# Patient Record
Sex: MALE | Race: WHITE | NOT HISPANIC OR LATINO | Employment: OTHER | URBAN - METROPOLITAN AREA
[De-identification: names, ages, dates, MRNs, and addresses within clinical notes are randomized per-mention and may not be internally consistent; named-entity substitution may affect disease eponyms.]

---

## 2023-01-31 RX ORDER — ALPHA-D-GALACTOSIDASE 400 UNIT
TABLET ORAL
COMMUNITY
End: 2023-11-02

## 2023-01-31 RX ORDER — MELOXICAM 15 MG/1
1 TABLET ORAL EVERY MORNING
COMMUNITY
Start: 2019-04-18 | End: 2024-06-06 | Stop reason: HOSPADM

## 2023-01-31 RX ORDER — METHYLPREDNISOLONE 4 MG/1
TABLET ORAL
COMMUNITY
Start: 2022-09-23 | End: 2023-04-06 | Stop reason: ALTCHOICE

## 2023-01-31 RX ORDER — TRAMADOL HYDROCHLORIDE 50 MG/1
50 TABLET ORAL EVERY 8 HOURS
COMMUNITY
End: 2023-11-02

## 2023-01-31 RX ORDER — METOPROLOL SUCCINATE 100 MG/1
1 TABLET, EXTENDED RELEASE ORAL DAILY
COMMUNITY
Start: 2017-08-10 | End: 2023-04-06 | Stop reason: SDUPTHER

## 2023-01-31 RX ORDER — IPRATROPIUM BROMIDE 21 UG/1
1 SPRAY, METERED NASAL
COMMUNITY
Start: 2022-09-14 | End: 2023-11-02

## 2023-01-31 RX ORDER — SIMETHICONE 125 MG
CAPSULE ORAL
COMMUNITY
End: 2023-11-02

## 2023-01-31 RX ORDER — GABAPENTIN 300 MG/1
1 CAPSULE ORAL
COMMUNITY
Start: 2021-12-01 | End: 2023-11-02

## 2023-01-31 RX ORDER — DIPHENHYDRAMINE HCL 25 MG
TABLET ORAL
COMMUNITY
End: 2023-11-02

## 2023-01-31 RX ORDER — SIMVASTATIN 20 MG/1
1 TABLET, FILM COATED ORAL DAILY
COMMUNITY
Start: 2017-07-06 | End: 2023-04-06 | Stop reason: SDUPTHER

## 2023-02-01 PROBLEM — E78.5 HYPERLIPEMIA: Status: ACTIVE | Noted: 2023-01-31

## 2023-02-01 PROBLEM — M25.551 HIP PAIN, RIGHT: Status: ACTIVE | Noted: 2023-01-31

## 2023-02-01 PROBLEM — R29.898 WEAKNESS OF SHOULDER: Status: ACTIVE | Noted: 2023-02-01

## 2023-02-01 PROBLEM — R30.0 DYSURIA: Status: ACTIVE | Noted: 2023-01-31

## 2023-02-01 PROBLEM — R29.898 COMPLAINTS OF LEG WEAKNESS: Status: ACTIVE | Noted: 2023-01-31

## 2023-02-01 PROBLEM — M46.1 SACROILIITIS (CMS-HCC): Status: ACTIVE | Noted: 2023-02-01

## 2023-02-01 PROBLEM — M16.11 ARTHRITIS OF RIGHT HIP: Status: ACTIVE | Noted: 2023-01-31

## 2023-02-01 PROBLEM — H61.23 BILATERAL IMPACTED CERUMEN: Status: ACTIVE | Noted: 2023-01-31

## 2023-02-01 PROBLEM — R41.840 CONCENTRATION DEFICIT: Status: ACTIVE | Noted: 2023-01-31

## 2023-02-01 PROBLEM — Z86.59 HISTORY OF CLAUSTROPHOBIA: Status: ACTIVE | Noted: 2023-01-31

## 2023-02-01 PROBLEM — S46.009A ROTATOR CUFF INJURY: Status: ACTIVE | Noted: 2023-02-01

## 2023-02-01 PROBLEM — R20.0 NUMBNESS: Status: ACTIVE | Noted: 2023-01-31

## 2023-02-01 PROBLEM — M19.90 OSTEOARTHRITIS: Status: ACTIVE | Noted: 2023-02-01

## 2023-02-01 PROBLEM — K86.2 PANCREATIC CYST (HHS-HCC): Status: ACTIVE | Noted: 2023-02-01

## 2023-02-01 PROBLEM — L40.9 PSORIASIS: Status: ACTIVE | Noted: 2023-02-01

## 2023-02-01 PROBLEM — H91.93 BILATERAL HEARING LOSS: Status: ACTIVE | Noted: 2023-01-31

## 2023-02-01 PROBLEM — M54.9 CHRONIC BACK PAIN: Status: ACTIVE | Noted: 2023-01-31

## 2023-02-01 PROBLEM — U07.1 COVID-19: Status: ACTIVE | Noted: 2023-01-31

## 2023-02-01 PROBLEM — M47.816 FACET DEGENERATION OF LUMBAR REGION: Status: ACTIVE | Noted: 2023-01-31

## 2023-02-01 PROBLEM — I10 BENIGN ESSENTIAL HTN: Status: ACTIVE | Noted: 2023-01-31

## 2023-02-01 PROBLEM — I45.10 RBBB: Status: ACTIVE | Noted: 2023-02-01

## 2023-02-01 PROBLEM — M13.0 POLYARTHROPATHY: Status: ACTIVE | Noted: 2023-02-01

## 2023-02-01 PROBLEM — D17.9 LIPOMA: Status: ACTIVE | Noted: 2023-01-31

## 2023-02-01 PROBLEM — R19.7 DIARRHEA: Status: ACTIVE | Noted: 2023-01-31

## 2023-02-01 PROBLEM — L98.9 SCALP LESION: Status: ACTIVE | Noted: 2023-02-01

## 2023-02-01 PROBLEM — M25.511 RIGHT SHOULDER PAIN: Status: ACTIVE | Noted: 2023-02-01

## 2023-02-01 PROBLEM — G89.29 CHRONIC BACK PAIN: Status: ACTIVE | Noted: 2023-01-31

## 2023-02-01 PROBLEM — R01.1 SYSTOLIC MURMUR: Status: ACTIVE | Noted: 2023-02-01

## 2023-02-01 PROBLEM — E66.9 OBESITY: Status: ACTIVE | Noted: 2023-01-31

## 2023-02-01 PROBLEM — M54.16 LUMBAR RADICULOPATHY: Status: ACTIVE | Noted: 2023-01-31

## 2023-02-01 PROBLEM — K56.2 SIGMOID VOLVULUS (MULTI): Status: ACTIVE | Noted: 2023-02-01

## 2023-02-01 PROBLEM — K58.0 IRRITABLE BOWEL SYNDROME WITH DIARRHEA: Status: ACTIVE | Noted: 2023-01-31

## 2023-02-01 PROBLEM — M25.551 ARTHRALGIA OF HIP, RIGHT: Status: ACTIVE | Noted: 2023-01-31

## 2023-03-14 ENCOUNTER — TELEPHONE (OUTPATIENT)
Dept: PRIMARY CARE | Facility: CLINIC | Age: 78
End: 2023-03-14

## 2023-03-15 ENCOUNTER — APPOINTMENT (OUTPATIENT)
Dept: PRIMARY CARE | Facility: CLINIC | Age: 78
End: 2023-03-15
Payer: MEDICARE

## 2023-04-06 ENCOUNTER — OFFICE VISIT (OUTPATIENT)
Dept: PRIMARY CARE | Facility: CLINIC | Age: 78
End: 2023-04-06
Payer: MEDICARE

## 2023-04-06 VITALS
BODY MASS INDEX: 32.28 KG/M2 | DIASTOLIC BLOOD PRESSURE: 76 MMHG | TEMPERATURE: 97.2 F | HEART RATE: 47 BPM | SYSTOLIC BLOOD PRESSURE: 128 MMHG | RESPIRATION RATE: 18 BRPM | WEIGHT: 225 LBS | OXYGEN SATURATION: 96 %

## 2023-04-06 DIAGNOSIS — I10 HYPERTENSION, UNSPECIFIED TYPE: ICD-10-CM

## 2023-04-06 DIAGNOSIS — K58.0 IRRITABLE BOWEL SYNDROME WITH DIARRHEA: ICD-10-CM

## 2023-04-06 DIAGNOSIS — K86.2 PANCREATIC CYST (HHS-HCC): ICD-10-CM

## 2023-04-06 DIAGNOSIS — M25.511 CHRONIC RIGHT SHOULDER PAIN: ICD-10-CM

## 2023-04-06 DIAGNOSIS — K90.9 DIARRHEA DUE TO MALABSORPTION (HHS-HCC): ICD-10-CM

## 2023-04-06 DIAGNOSIS — N20.0 NEPHROLITHIASIS: ICD-10-CM

## 2023-04-06 DIAGNOSIS — E78.5 DYSLIPIDEMIA: ICD-10-CM

## 2023-04-06 DIAGNOSIS — G89.29 CHRONIC RIGHT SHOULDER PAIN: ICD-10-CM

## 2023-04-06 DIAGNOSIS — M25.551 HIP PAIN, RIGHT: ICD-10-CM

## 2023-04-06 DIAGNOSIS — M54.16 LUMBAR RADICULOPATHY: ICD-10-CM

## 2023-04-06 DIAGNOSIS — E78.2 MODERATE MIXED HYPERLIPIDEMIA NOT REQUIRING STATIN THERAPY: ICD-10-CM

## 2023-04-06 DIAGNOSIS — I10 BENIGN ESSENTIAL HTN: ICD-10-CM

## 2023-04-06 DIAGNOSIS — R19.7 DIARRHEA DUE TO MALABSORPTION (HHS-HCC): ICD-10-CM

## 2023-04-06 DIAGNOSIS — N20.0 RENAL STONE: Primary | ICD-10-CM

## 2023-04-06 PROCEDURE — 3074F SYST BP LT 130 MM HG: CPT | Performed by: INTERNAL MEDICINE

## 2023-04-06 PROCEDURE — 3078F DIAST BP <80 MM HG: CPT | Performed by: INTERNAL MEDICINE

## 2023-04-06 PROCEDURE — 1036F TOBACCO NON-USER: CPT | Performed by: INTERNAL MEDICINE

## 2023-04-06 PROCEDURE — 99214 OFFICE O/P EST MOD 30 MIN: CPT | Performed by: INTERNAL MEDICINE

## 2023-04-06 PROCEDURE — 1159F MED LIST DOCD IN RCRD: CPT | Performed by: INTERNAL MEDICINE

## 2023-04-06 RX ORDER — TAMSULOSIN HYDROCHLORIDE 0.4 MG/1
1 CAPSULE ORAL DAILY
COMMUNITY
Start: 2023-04-02

## 2023-04-06 RX ORDER — SIMVASTATIN 20 MG/1
20 TABLET, FILM COATED ORAL DAILY
Qty: 90 TABLET | Refills: 3 | Status: SHIPPED | OUTPATIENT
Start: 2023-04-06 | End: 2024-04-26 | Stop reason: SDUPTHER

## 2023-04-06 RX ORDER — METOPROLOL SUCCINATE 100 MG/1
100 TABLET, EXTENDED RELEASE ORAL DAILY
Qty: 90 TABLET | Refills: 3 | Status: SHIPPED | OUTPATIENT
Start: 2023-04-06 | End: 2023-12-07 | Stop reason: SDUPTHER

## 2023-04-06 ASSESSMENT — PATIENT HEALTH QUESTIONNAIRE - PHQ9
2. FEELING DOWN, DEPRESSED OR HOPELESS: NOT AT ALL
1. LITTLE INTEREST OR PLEASURE IN DOING THINGS: NOT AT ALL
SUM OF ALL RESPONSES TO PHQ9 QUESTIONS 1 AND 2: 0

## 2023-04-06 ASSESSMENT — PAIN SCALES - GENERAL: PAINLEVEL: 3

## 2023-04-06 NOTE — PROGRESS NOTES
Subjective   Patient ID: Kit Franklin is a 77 y.o. male who presents for Annual Exam (Patient stated he would like to go over CAT scan.).    HPI patient presents to the office for scheduled visit was last seen in the office back in September of last year.  At that time because of the ongoing issue could not identify a source or cause of chronic diarrhea patient was in the GI service initial evaluation has not yet also changed the course of events for him I believe we will also plan Xifaxan with no benefit still that this was adequate required treatment and also over-the-counter    The most recent event was a pain in the flank that he was not sure what it was the pain escalated to the point that he went to emergency room was diagnosed with a left ureteral stone metformin and is in in size with several in the left upper pole of the kidney there is mild hydroureter it appears that by the time he went home he had been feeling much better because he was treated in the ER and the next morning he developed significant pain again which by the next couple of days the pain subsided he might of passed the stone he has been seen by urology and no intervention recommended currently without any pain no history prior of stones of unknown to him    Pancreatic enzymes have not been is issue for which is opted to get treatment through injections previously shoulder problem is very arthritic is opted against surgery remains adequately against it.    He still is very active and gets around and does work including yard work.    He drinks alcohol probably more than he needs to but not excessive when he was younger he was more dependent.  He does not appear to be entirely a malabsorptive process from that issue baseline normal elastase level.    Remains vaccinated    No chest pain or dyspnea or dizziness or nausea    No falls      Review of Systems 10 systems are reviewed with dementia were negative  Objective   /76 (BP Location:  Left arm, Patient Position: Sitting)   Pulse (!) 47   Temp 36.2 °C (97.2 °F)   Resp 18   Wt 102 kg (225 lb)   SpO2 96%   BMI 32.28 kg/m²     Physical Exam HEENT normocephalic atraumatic pupils round and reactive no oropharyngeal exudate no thyromegaly  Carotid bruits absent  Cardiovascular regular rate and rhythm no murmurs  Respiratory bilateral breath sounds without rales or rhonchi or mitral chest expansion bilaterally  Abdomen soft nontender bowel sounds are present no organomegaly  Skin warm without any rashes  Musculoskeletal no digital clubbing or cyanosis normal gait no muscle atrophy  Neuro alert and oriented Ãƒ-3. Speech clear. Follows commands appropriately  Pulse normal carotid pulse normal radial pulse normal pedal pulses      Assessment/Plan   Problem List Items Addressed This Visit          Nervous    Lumbar radiculopathy       Circulatory    Benign essential HTN     Well-controlled no change of course review renal panel            Digestive    Irritable bowel syndrome with diarrhea    Pancreatic cyst     Based on review of GIs note in EMR and patient's old record it appears to have been a benign cyst present at least 5 years along            Musculoskeletal    Hip pain, right     Previously responded well to intra-articular injection of steroid under fluoroscopy or CT-guided         Right shoulder pain     This is a very arthritic shoulder that he has not agreed to undergo a replacement            Other    Diarrhea     Chronic diarrhea up to 4 episodes per day history of increasing excessive alcohol consumption although now it is not excessive but it is daily there is no evidence of any    Deficiency based on status    IBS is possibility of course medication cause is not identified a source    He was asked to continue to follow with GI         Relevant Orders    Thyroid Stimulating Hormone    Thyroxine, Free    Pancreatic Elastase, Fecal    Hyperlipemia     Well-tolerated statin therapy   He is  due for lipid profile and a hepatic profile which will be ordered to be done fasting          Other Visit Diagnoses       Renal stone    -  Primary    Relevant Orders    Urinalysis Microscopic Only    Hypertension, unspecified type        Relevant Medications    metoprolol succinate XL (Toprol-XL) 100 mg 24 hr tablet    Other Relevant Orders    Comprehensive Metabolic Panel    Dyslipidemia        Relevant Medications    simvastatin (Zocor) 20 mg tablet    Other Relevant Orders    CBC and Auto Differential    Comprehensive Metabolic Panel    Lipid panel    Nephrolithiasis

## 2023-04-06 NOTE — ASSESSMENT & PLAN NOTE
Well-tolerated statin therapy   He is due for lipid profile and a hepatic profile which will be ordered to be done fasting

## 2023-04-06 NOTE — ASSESSMENT & PLAN NOTE
Chronic diarrhea up to 4 episodes per day history of increasing excessive alcohol consumption although now it is not excessive but it is daily there is no evidence of any    Deficiency based on status    IBS is possibility of course medication cause is not identified a source    He was asked to continue to follow with GI

## 2023-10-06 ENCOUNTER — APPOINTMENT (OUTPATIENT)
Dept: PRIMARY CARE | Facility: CLINIC | Age: 78
End: 2023-10-06
Payer: MEDICARE

## 2023-11-02 ENCOUNTER — OFFICE VISIT (OUTPATIENT)
Dept: PRIMARY CARE | Facility: CLINIC | Age: 78
End: 2023-11-02
Payer: MEDICARE

## 2023-11-02 VITALS
SYSTOLIC BLOOD PRESSURE: 124 MMHG | HEIGHT: 67 IN | WEIGHT: 229.6 LBS | HEART RATE: 60 BPM | BODY MASS INDEX: 36.03 KG/M2 | OXYGEN SATURATION: 98 % | DIASTOLIC BLOOD PRESSURE: 72 MMHG

## 2023-11-02 DIAGNOSIS — I10 HYPERTENSION, UNSPECIFIED TYPE: ICD-10-CM

## 2023-11-02 DIAGNOSIS — Z00.00 ANNUAL PHYSICAL EXAM: Primary | ICD-10-CM

## 2023-11-02 DIAGNOSIS — K58.0 IRRITABLE BOWEL SYNDROME WITH DIARRHEA: ICD-10-CM

## 2023-11-02 DIAGNOSIS — Z79.899 HIGH RISK MEDICATION USE: ICD-10-CM

## 2023-11-02 DIAGNOSIS — R53.83 FATIGUE, UNSPECIFIED TYPE: ICD-10-CM

## 2023-11-02 DIAGNOSIS — E78.5 DYSLIPIDEMIA: ICD-10-CM

## 2023-11-02 PROCEDURE — 1036F TOBACCO NON-USER: CPT | Performed by: FAMILY MEDICINE

## 2023-11-02 PROCEDURE — 3074F SYST BP LT 130 MM HG: CPT | Performed by: FAMILY MEDICINE

## 2023-11-02 PROCEDURE — 1125F AMNT PAIN NOTED PAIN PRSNT: CPT | Performed by: FAMILY MEDICINE

## 2023-11-02 PROCEDURE — 1170F FXNL STATUS ASSESSED: CPT | Performed by: FAMILY MEDICINE

## 2023-11-02 PROCEDURE — 1159F MED LIST DOCD IN RCRD: CPT | Performed by: FAMILY MEDICINE

## 2023-11-02 PROCEDURE — 3078F DIAST BP <80 MM HG: CPT | Performed by: FAMILY MEDICINE

## 2023-11-02 PROCEDURE — 99214 OFFICE O/P EST MOD 30 MIN: CPT | Performed by: FAMILY MEDICINE

## 2023-11-02 PROCEDURE — G0439 PPPS, SUBSEQ VISIT: HCPCS | Performed by: FAMILY MEDICINE

## 2023-11-02 PROCEDURE — 1160F RVW MEDS BY RX/DR IN RCRD: CPT | Performed by: FAMILY MEDICINE

## 2023-11-02 RX ORDER — CHOLESTYRAMINE 4 G/5.5G
4 POWDER, FOR SUSPENSION ORAL 2 TIMES DAILY PRN
Qty: 60 PACKET | Refills: 11 | Status: SHIPPED | OUTPATIENT
Start: 2023-11-02 | End: 2024-11-01

## 2023-11-02 ASSESSMENT — PAIN SCALES - GENERAL: PAINLEVEL: 2

## 2023-11-02 ASSESSMENT — ENCOUNTER SYMPTOMS
LOSS OF SENSATION IN FEET: 0
OCCASIONAL FEELINGS OF UNSTEADINESS: 0
DEPRESSION: 0

## 2023-11-02 ASSESSMENT — ACTIVITIES OF DAILY LIVING (ADL)
BATHING: INDEPENDENT
DRESSING: INDEPENDENT
TAKING_MEDICATION: INDEPENDENT
GROCERY_SHOPPING: INDEPENDENT
MANAGING_FINANCES: INDEPENDENT
DOING_HOUSEWORK: INDEPENDENT

## 2023-11-02 ASSESSMENT — PATIENT HEALTH QUESTIONNAIRE - PHQ9
1. LITTLE INTEREST OR PLEASURE IN DOING THINGS: NOT AT ALL
SUM OF ALL RESPONSES TO PHQ9 QUESTIONS 1 AND 2: 0
2. FEELING DOWN, DEPRESSED OR HOPELESS: NOT AT ALL

## 2023-11-02 NOTE — PATIENT INSTRUCTIONS
Decrease metoprolol to three quarters of a tablet for 1 week will be cut back cut down to half a tablet daily  Stop caffeine for 1 week and see if better with diarrhea.  If not better, start the cholestyramine.  Can start with 1 pack daily and try it for 1 week.  If not better can increase to 1 tab twice daily.

## 2023-11-02 NOTE — PROGRESS NOTES
"Subjective   Patient ID: Kit Franklin is a 77 y.o. male who presents for New Patient Visit (Establish care with pcp, pt is not fasting. ) and Medicare Annual Wellness Visit Subsequent (Medicare annual exam. ).    HPI   Patient here to establish and get Medicare wellness check.  Past medical history reviewed with patient along with a review of his medical records.  .   Retired x 17yrs.  Work: .    Review of Systems  Complains of no energy.  Plays a little golf and chess. Sleep fair.  Wakes at night.  Bowels wakes him at night. At least 3 BMs daily.  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  Neck: Supple, no JVD or carotid bruit  Heart: Regular rate and rhythm, no murmur  Lungs: Clear to auscultation  Lower extremities: No edema    Assessment/Plan   Problem List Items Addressed This Visit             ICD-10-CM       Gastrointestinal and Abdominal    Irritable bowel syndrome with diarrhea K58.0    Relevant Medications    cholestyramine-aspartame (Prevalite) 4 gram packet     Other Visit Diagnoses         Codes    Annual physical exam    -  Primary Z00.00    Relevant Orders    Comprehensive Metabolic Panel    CBC    Lipid Panel    TSH with reflex to Free T4 if abnormal    Hepatitis C antibody    Hemoglobin A1c    Hypertension, unspecified type     I10    Relevant Orders    Comprehensive Metabolic Panel    CBC    Lipid Panel    TSH with reflex to Free T4 if abnormal    Dyslipidemia     E78.5    Relevant Orders    Comprehensive Metabolic Panel    CBC    Lipid Panel    High risk medication use     Z79.899    Relevant Orders    Comprehensive Metabolic Panel    CBC    Lipid Panel    Fatigue, unspecified type     R53.83    Relevant Orders    Comprehensive " Metabolic Panel    CBC    TSH with reflex to Free T4 if abnormal        1.  Health maintenance: Care gaps addressed  2.  Irritable bowel bowel with diarrhea: Patient with a history of colon surgery.  He has been suffering with chronic diarrhea.  He has urgent emergency bowels.  We are going to have him hold his coffee for 1 week to see if this makes a difference.  If no change, he can start cholestyramine slowly.  We will see him back in 1 month to reevaluate  3.  Hypertension: Patient complaining of fatigue.  We are going to cut his metoprolol down to 75 mg daily and then to 50 mg after 1 week.  He will follow-up in 1 week.  He is to call with any problems of racing heart or palpitations.  4.  Fatigue: Hopefully with improving his bowel issue and his blood pressure medication adjustment, we can get him feeling better and being more physically active again.  TSH and routine labs ordered  5.  Hyperlipidemia: We will recheck lipid panel.  We will contact patient as results come in.  Plan follow-up in 1 month

## 2023-11-16 DIAGNOSIS — Z12.5 PROSTATE CANCER SCREENING: Primary | ICD-10-CM

## 2023-11-21 ENCOUNTER — LAB (OUTPATIENT)
Dept: LAB | Facility: LAB | Age: 78
End: 2023-11-21
Payer: MEDICARE

## 2023-11-21 DIAGNOSIS — E78.5 DYSLIPIDEMIA: ICD-10-CM

## 2023-11-21 DIAGNOSIS — I10 HYPERTENSION, UNSPECIFIED TYPE: ICD-10-CM

## 2023-11-21 DIAGNOSIS — R53.83 FATIGUE, UNSPECIFIED TYPE: ICD-10-CM

## 2023-11-21 DIAGNOSIS — Z12.5 PROSTATE CANCER SCREENING: ICD-10-CM

## 2023-11-21 DIAGNOSIS — Z00.00 ANNUAL PHYSICAL EXAM: ICD-10-CM

## 2023-11-21 DIAGNOSIS — Z79.899 HIGH RISK MEDICATION USE: ICD-10-CM

## 2023-11-21 LAB
ALBUMIN SERPL BCP-MCNC: 4.5 G/DL (ref 3.4–5)
ALP SERPL-CCNC: 65 U/L (ref 33–136)
ALT SERPL W P-5'-P-CCNC: 22 U/L (ref 10–52)
ANION GAP SERPL CALC-SCNC: 11 MMOL/L (ref 10–20)
AST SERPL W P-5'-P-CCNC: 20 U/L (ref 9–39)
BILIRUB SERPL-MCNC: 0.7 MG/DL (ref 0–1.2)
BUN SERPL-MCNC: 21 MG/DL (ref 6–23)
CALCIUM SERPL-MCNC: 9.5 MG/DL (ref 8.6–10.6)
CHLORIDE SERPL-SCNC: 108 MMOL/L (ref 98–107)
CHOLEST SERPL-MCNC: 155 MG/DL (ref 0–199)
CHOLESTEROL/HDL RATIO: 2.8
CO2 SERPL-SCNC: 29 MMOL/L (ref 21–32)
CREAT SERPL-MCNC: 0.74 MG/DL (ref 0.5–1.3)
ERYTHROCYTE [DISTWIDTH] IN BLOOD BY AUTOMATED COUNT: 13.2 % (ref 11.5–14.5)
EST. AVERAGE GLUCOSE BLD GHB EST-MCNC: 114 MG/DL
GFR SERPL CREATININE-BSD FRML MDRD: >90 ML/MIN/1.73M*2
GLUCOSE SERPL-MCNC: 94 MG/DL (ref 74–99)
HBA1C MFR BLD: 5.6 %
HCT VFR BLD AUTO: 42.8 % (ref 41–52)
HCV AB SER QL: NONREACTIVE
HDLC SERPL-MCNC: 55.5 MG/DL
HGB BLD-MCNC: 13.6 G/DL (ref 13.5–17.5)
LDLC SERPL CALC-MCNC: 74 MG/DL
MCH RBC QN AUTO: 31.5 PG (ref 26–34)
MCHC RBC AUTO-ENTMCNC: 31.8 G/DL (ref 32–36)
MCV RBC AUTO: 99 FL (ref 80–100)
NON HDL CHOLESTEROL: 100 MG/DL (ref 0–149)
NRBC BLD-RTO: 0 /100 WBCS (ref 0–0)
PLATELET # BLD AUTO: 159 X10*3/UL (ref 150–450)
POTASSIUM SERPL-SCNC: 4 MMOL/L (ref 3.5–5.3)
PROT SERPL-MCNC: 7 G/DL (ref 6.4–8.2)
PSA SERPL-MCNC: 0.28 NG/ML
RBC # BLD AUTO: 4.32 X10*6/UL (ref 4.5–5.9)
SODIUM SERPL-SCNC: 144 MMOL/L (ref 136–145)
TRIGL SERPL-MCNC: 126 MG/DL (ref 0–149)
TSH SERPL-ACNC: 2.1 MIU/L (ref 0.44–3.98)
VLDL: 25 MG/DL (ref 0–40)
WBC # BLD AUTO: 7.9 X10*3/UL (ref 4.4–11.3)

## 2023-11-21 PROCEDURE — 85027 COMPLETE CBC AUTOMATED: CPT

## 2023-11-21 PROCEDURE — 36415 COLL VENOUS BLD VENIPUNCTURE: CPT

## 2023-11-21 PROCEDURE — 80061 LIPID PANEL: CPT

## 2023-11-21 PROCEDURE — 84443 ASSAY THYROID STIM HORMONE: CPT

## 2023-11-21 PROCEDURE — 83036 HEMOGLOBIN GLYCOSYLATED A1C: CPT

## 2023-11-21 PROCEDURE — G0103 PSA SCREENING: HCPCS

## 2023-11-21 PROCEDURE — 86803 HEPATITIS C AB TEST: CPT

## 2023-11-21 PROCEDURE — 80053 COMPREHEN METABOLIC PANEL: CPT

## 2023-12-06 ASSESSMENT — ENCOUNTER SYMPTOMS
BLURRED VISION: 0
HYPERTENSION: 1
SHORTNESS OF BREATH: 0
NECK PAIN: 1
SWEATS: 0
PND: 0
HEADACHES: 0
ORTHOPNEA: 0
PALPITATIONS: 0

## 2023-12-07 ENCOUNTER — OFFICE VISIT (OUTPATIENT)
Dept: PRIMARY CARE | Facility: CLINIC | Age: 78
End: 2023-12-07
Payer: MEDICARE

## 2023-12-07 VITALS
WEIGHT: 230.6 LBS | BODY MASS INDEX: 36.12 KG/M2 | SYSTOLIC BLOOD PRESSURE: 130 MMHG | HEART RATE: 66 BPM | DIASTOLIC BLOOD PRESSURE: 80 MMHG | OXYGEN SATURATION: 97 %

## 2023-12-07 DIAGNOSIS — K58.0 IRRITABLE BOWEL SYNDROME WITH DIARRHEA: ICD-10-CM

## 2023-12-07 DIAGNOSIS — S16.1XXA STRAIN OF NECK MUSCLE, INITIAL ENCOUNTER: Primary | ICD-10-CM

## 2023-12-07 DIAGNOSIS — I10 HYPERTENSION, UNSPECIFIED TYPE: ICD-10-CM

## 2023-12-07 PROCEDURE — 1125F AMNT PAIN NOTED PAIN PRSNT: CPT | Performed by: FAMILY MEDICINE

## 2023-12-07 PROCEDURE — 3075F SYST BP GE 130 - 139MM HG: CPT | Performed by: FAMILY MEDICINE

## 2023-12-07 PROCEDURE — 1159F MED LIST DOCD IN RCRD: CPT | Performed by: FAMILY MEDICINE

## 2023-12-07 PROCEDURE — 3079F DIAST BP 80-89 MM HG: CPT | Performed by: FAMILY MEDICINE

## 2023-12-07 PROCEDURE — 1036F TOBACCO NON-USER: CPT | Performed by: FAMILY MEDICINE

## 2023-12-07 PROCEDURE — 99214 OFFICE O/P EST MOD 30 MIN: CPT | Performed by: FAMILY MEDICINE

## 2023-12-07 PROCEDURE — 1160F RVW MEDS BY RX/DR IN RCRD: CPT | Performed by: FAMILY MEDICINE

## 2023-12-07 RX ORDER — METOPROLOL SUCCINATE 50 MG/1
50 TABLET, EXTENDED RELEASE ORAL DAILY
Qty: 90 TABLET | Refills: 3 | Status: SHIPPED | OUTPATIENT
Start: 2023-12-07 | End: 2024-12-06

## 2023-12-07 ASSESSMENT — PAIN SCALES - GENERAL: PAINLEVEL: 2

## 2023-12-07 ASSESSMENT — ENCOUNTER SYMPTOMS: DEPRESSION: 0

## 2023-12-07 NOTE — PATIENT INSTRUCTIONS
"Goal used to, and go to the You [employer] website.  Then search Shannan Velázquez.  When you get there you will see her videos and find the video for her \"neck and upper back.\"  Alternatively, do a search for \"forward head\"  or just look for physical therapy for neck pain.  Consider for Physical Therapy  "

## 2023-12-07 NOTE — PROGRESS NOTES
Subjective   Patient ID: Kit Franklin is a 77 y.o. male who presents for Follow-up (1 month follow up to review medications.) and Neck Pain (Right side of neck pain and a little swollen. ).    HPI patient here for follow-up.  We lowered his metoprolol last visit.  He is feeling less fatigued.  Diarrhea improved a small amount but not significantly.  He did try the cholestyramine.  Patient has had some discomfort on the right side of his neck for over a week.  He feels it is a bit swollen.  There is some mild discomfort with range of motion.    Review of Systems    Objective   /80 (BP Location: Left arm, Patient Position: Sitting, BP Cuff Size: Adult)   Pulse 66   Wt 105 kg (230 lb 9.6 oz)   SpO2 97%   BMI 36.12 kg/m²     Physical Exam  Alert, pleasant and in no acute distress.  Neck: Supple, no JVD or carotid bruit.  Forward leaning head.  No gross swelling or deformity noted.  Mild to moderate generalized restriction of motion noted in all planes.  Heart: Regular rate and rhythm, no murmur  Lungs: Clear to auscultation  Lower extremities: No edema    Assessment/Plan   Problem List Items Addressed This Visit             ICD-10-CM       Gastrointestinal and Abdominal    Irritable bowel syndrome with diarrhea K58.0     Other Visit Diagnoses         Codes    Strain of neck muscle, initial encounter    -  Primary S16.1XXA    Hypertension, unspecified type     I10    Relevant Medications    metoprolol succinate XL (Toprol-XL) 50 mg 24 hr tablet          Patient here for follow-up.  1.  Hypertension: Still well-controlled.  We will continue the lower dose metoprolol as patient is feeling better.  2.  Diarrhea/IBS: Encourage patient to try twice daily cholestyramine.  Discussed avenues to success to control the bowels.  3.  Neck strain: I believe patient has a strained neck secondary to his arthritic forward leaning head.  Encourage patient to perform therapeutic type exercises.  Website information provided.   Would consider injections or other more aggressive treatments if not improving.  If there are new or evolving symptoms, patient should let us know.  Follow-up 6 months

## 2024-02-12 ENCOUNTER — APPOINTMENT (OUTPATIENT)
Dept: RADIOLOGY | Facility: HOSPITAL | Age: 79
End: 2024-02-12
Payer: MEDICARE

## 2024-02-12 ENCOUNTER — TELEPHONE (OUTPATIENT)
Dept: PRIMARY CARE | Facility: CLINIC | Age: 79
End: 2024-02-12
Payer: MEDICARE

## 2024-02-12 ENCOUNTER — HOSPITAL ENCOUNTER (EMERGENCY)
Facility: HOSPITAL | Age: 79
Discharge: HOME | End: 2024-02-12
Attending: EMERGENCY MEDICINE
Payer: MEDICARE

## 2024-02-12 VITALS
SYSTOLIC BLOOD PRESSURE: 134 MMHG | TEMPERATURE: 97.3 F | WEIGHT: 230 LBS | HEART RATE: 89 BPM | BODY MASS INDEX: 36.1 KG/M2 | DIASTOLIC BLOOD PRESSURE: 78 MMHG | RESPIRATION RATE: 18 BRPM | OXYGEN SATURATION: 98 % | HEIGHT: 67 IN

## 2024-02-12 DIAGNOSIS — M54.50 ACUTE LEFT-SIDED LOW BACK PAIN WITHOUT SCIATICA: Primary | ICD-10-CM

## 2024-02-12 DIAGNOSIS — M54.50 ACUTE RIGHT-SIDED LOW BACK PAIN WITHOUT SCIATICA: Primary | ICD-10-CM

## 2024-02-12 LAB
ALBUMIN SERPL BCP-MCNC: 4.6 G/DL (ref 3.4–5)
ALP SERPL-CCNC: 62 U/L (ref 33–136)
ALT SERPL W P-5'-P-CCNC: 22 U/L (ref 10–52)
ANION GAP SERPL CALC-SCNC: 12 MMOL/L (ref 10–20)
APPEARANCE UR: CLEAR
AST SERPL W P-5'-P-CCNC: 19 U/L (ref 9–39)
BASOPHILS # BLD AUTO: 0.02 X10*3/UL (ref 0–0.1)
BASOPHILS NFR BLD AUTO: 0.3 %
BILIRUB SERPL-MCNC: 0.4 MG/DL (ref 0–1.2)
BILIRUB UR STRIP.AUTO-MCNC: NEGATIVE MG/DL
BUN SERPL-MCNC: 24 MG/DL (ref 6–23)
CALCIUM SERPL-MCNC: 9.3 MG/DL (ref 8.6–10.3)
CHLORIDE SERPL-SCNC: 107 MMOL/L (ref 98–107)
CO2 SERPL-SCNC: 23 MMOL/L (ref 21–32)
COLOR UR: YELLOW
CREAT SERPL-MCNC: 0.78 MG/DL (ref 0.5–1.3)
EGFRCR SERPLBLD CKD-EPI 2021: >90 ML/MIN/1.73M*2
EOSINOPHIL # BLD AUTO: 0.07 X10*3/UL (ref 0–0.4)
EOSINOPHIL NFR BLD AUTO: 1.2 %
ERYTHROCYTE [DISTWIDTH] IN BLOOD BY AUTOMATED COUNT: 13 % (ref 11.5–14.5)
GLUCOSE SERPL-MCNC: 93 MG/DL (ref 74–99)
GLUCOSE UR STRIP.AUTO-MCNC: NEGATIVE MG/DL
HCT VFR BLD AUTO: 40.1 % (ref 41–52)
HGB BLD-MCNC: 13.5 G/DL (ref 13.5–17.5)
IMM GRANULOCYTES # BLD AUTO: 0.01 X10*3/UL (ref 0–0.5)
IMM GRANULOCYTES NFR BLD AUTO: 0.2 % (ref 0–0.9)
KETONES UR STRIP.AUTO-MCNC: NEGATIVE MG/DL
LACTATE SERPL-SCNC: 1.7 MMOL/L (ref 0.4–2)
LEUKOCYTE ESTERASE UR QL STRIP.AUTO: NEGATIVE
LYMPHOCYTES # BLD AUTO: 2.27 X10*3/UL (ref 0.8–3)
LYMPHOCYTES NFR BLD AUTO: 38.6 %
MAGNESIUM SERPL-MCNC: 2.13 MG/DL (ref 1.6–2.4)
MCH RBC QN AUTO: 32.7 PG (ref 26–34)
MCHC RBC AUTO-ENTMCNC: 33.7 G/DL (ref 32–36)
MCV RBC AUTO: 97 FL (ref 80–100)
MONOCYTES # BLD AUTO: 0.77 X10*3/UL (ref 0.05–0.8)
MONOCYTES NFR BLD AUTO: 13.1 %
NEUTROPHILS # BLD AUTO: 2.74 X10*3/UL (ref 1.6–5.5)
NEUTROPHILS NFR BLD AUTO: 46.6 %
NITRITE UR QL STRIP.AUTO: NEGATIVE
NRBC BLD-RTO: 0 /100 WBCS (ref 0–0)
PH UR STRIP.AUTO: 5 [PH]
PLATELET # BLD AUTO: 147 X10*3/UL (ref 150–450)
POTASSIUM SERPL-SCNC: 4.1 MMOL/L (ref 3.5–5.3)
PROT SERPL-MCNC: 7.1 G/DL (ref 6.4–8.2)
PROT UR STRIP.AUTO-MCNC: NEGATIVE MG/DL
RBC # BLD AUTO: 4.13 X10*6/UL (ref 4.5–5.9)
RBC # UR STRIP.AUTO: NEGATIVE /UL
SODIUM SERPL-SCNC: 138 MMOL/L (ref 136–145)
SP GR UR STRIP.AUTO: 1.02
UROBILINOGEN UR STRIP.AUTO-MCNC: <2 MG/DL
WBC # BLD AUTO: 5.9 X10*3/UL (ref 4.4–11.3)

## 2024-02-12 PROCEDURE — 96376 TX/PRO/DX INJ SAME DRUG ADON: CPT

## 2024-02-12 PROCEDURE — 96361 HYDRATE IV INFUSION ADD-ON: CPT

## 2024-02-12 PROCEDURE — 2550000001 HC RX 255 CONTRASTS: Performed by: EMERGENCY MEDICINE

## 2024-02-12 PROCEDURE — 96375 TX/PRO/DX INJ NEW DRUG ADDON: CPT

## 2024-02-12 PROCEDURE — 83605 ASSAY OF LACTIC ACID: CPT | Performed by: EMERGENCY MEDICINE

## 2024-02-12 PROCEDURE — 96374 THER/PROPH/DIAG INJ IV PUSH: CPT | Mod: 59

## 2024-02-12 PROCEDURE — 36415 COLL VENOUS BLD VENIPUNCTURE: CPT | Performed by: EMERGENCY MEDICINE

## 2024-02-12 PROCEDURE — 80053 COMPREHEN METABOLIC PANEL: CPT | Performed by: EMERGENCY MEDICINE

## 2024-02-12 PROCEDURE — 99284 EMERGENCY DEPT VISIT MOD MDM: CPT | Mod: 25,27

## 2024-02-12 PROCEDURE — 74177 CT ABD & PELVIS W/CONTRAST: CPT | Performed by: STUDENT IN AN ORGANIZED HEALTH CARE EDUCATION/TRAINING PROGRAM

## 2024-02-12 PROCEDURE — 85025 COMPLETE CBC W/AUTO DIFF WBC: CPT | Performed by: EMERGENCY MEDICINE

## 2024-02-12 PROCEDURE — 2500000004 HC RX 250 GENERAL PHARMACY W/ HCPCS (ALT 636 FOR OP/ED): Performed by: EMERGENCY MEDICINE

## 2024-02-12 PROCEDURE — 74177 CT ABD & PELVIS W/CONTRAST: CPT

## 2024-02-12 PROCEDURE — 83735 ASSAY OF MAGNESIUM: CPT | Performed by: EMERGENCY MEDICINE

## 2024-02-12 PROCEDURE — 81003 URINALYSIS AUTO W/O SCOPE: CPT | Performed by: EMERGENCY MEDICINE

## 2024-02-12 RX ORDER — MORPHINE SULFATE 4 MG/ML
4 INJECTION, SOLUTION INTRAMUSCULAR; INTRAVENOUS ONCE
Status: COMPLETED | OUTPATIENT
Start: 2024-02-12 | End: 2024-02-12

## 2024-02-12 RX ORDER — ONDANSETRON HYDROCHLORIDE 2 MG/ML
4 INJECTION, SOLUTION INTRAVENOUS ONCE
Status: COMPLETED | OUTPATIENT
Start: 2024-02-12 | End: 2024-02-12

## 2024-02-12 RX ORDER — CYCLOBENZAPRINE HCL 10 MG
10 TABLET ORAL 3 TIMES DAILY PRN
Qty: 30 TABLET | Refills: 0 | Status: SHIPPED | OUTPATIENT
Start: 2024-02-12 | End: 2024-06-06 | Stop reason: HOSPADM

## 2024-02-12 RX ORDER — LIDOCAINE 50 MG/G
1 PATCH TOPICAL DAILY
Qty: 30 PATCH | Refills: 0 | Status: SHIPPED | OUTPATIENT
Start: 2024-02-12 | End: 2024-02-13 | Stop reason: WASHOUT

## 2024-02-12 RX ORDER — OXYCODONE AND ACETAMINOPHEN 5; 325 MG/1; MG/1
1 TABLET ORAL EVERY 6 HOURS PRN
Qty: 10 TABLET | Refills: 0 | Status: SHIPPED | OUTPATIENT
Start: 2024-02-12 | End: 2024-02-15

## 2024-02-12 RX ADMIN — IOHEXOL 75 ML: 350 INJECTION, SOLUTION INTRAVENOUS at 21:23

## 2024-02-12 RX ADMIN — SODIUM CHLORIDE, POTASSIUM CHLORIDE, SODIUM LACTATE AND CALCIUM CHLORIDE 1000 ML: 600; 310; 30; 20 INJECTION, SOLUTION INTRAVENOUS at 19:47

## 2024-02-12 RX ADMIN — MORPHINE SULFATE 4 MG: 4 INJECTION, SOLUTION INTRAMUSCULAR; INTRAVENOUS at 19:48

## 2024-02-12 RX ADMIN — MORPHINE SULFATE 4 MG: 4 INJECTION, SOLUTION INTRAMUSCULAR; INTRAVENOUS at 22:46

## 2024-02-12 RX ADMIN — ONDANSETRON 4 MG: 2 INJECTION INTRAMUSCULAR; INTRAVENOUS at 19:48

## 2024-02-12 ASSESSMENT — LIFESTYLE VARIABLES
EVER HAD A DRINK FIRST THING IN THE MORNING TO STEADY YOUR NERVES TO GET RID OF A HANGOVER: NO
HAVE YOU EVER FELT YOU SHOULD CUT DOWN ON YOUR DRINKING: NO
EVER FELT BAD OR GUILTY ABOUT YOUR DRINKING: NO
HAVE PEOPLE ANNOYED YOU BY CRITICIZING YOUR DRINKING: NO

## 2024-02-12 ASSESSMENT — COLUMBIA-SUICIDE SEVERITY RATING SCALE - C-SSRS
6. HAVE YOU EVER DONE ANYTHING, STARTED TO DO ANYTHING, OR PREPARED TO DO ANYTHING TO END YOUR LIFE?: NO
2. HAVE YOU ACTUALLY HAD ANY THOUGHTS OF KILLING YOURSELF?: NO
1. IN THE PAST MONTH, HAVE YOU WISHED YOU WERE DEAD OR WISHED YOU COULD GO TO SLEEP AND NOT WAKE UP?: NO

## 2024-02-12 ASSESSMENT — PAIN DESCRIPTION - PAIN TYPE: TYPE: ACUTE PAIN

## 2024-02-12 ASSESSMENT — PAIN SCALES - GENERAL
PAINLEVEL_OUTOF10: 6
PAINLEVEL_OUTOF10: 6
PAINLEVEL_OUTOF10: 7

## 2024-02-12 ASSESSMENT — PAIN - FUNCTIONAL ASSESSMENT
PAIN_FUNCTIONAL_ASSESSMENT: 0-10
PAIN_FUNCTIONAL_ASSESSMENT: 0-10

## 2024-02-12 ASSESSMENT — PAIN DESCRIPTION - ORIENTATION: ORIENTATION: LEFT;LOWER

## 2024-02-12 ASSESSMENT — PAIN DESCRIPTION - LOCATION: LOCATION: BACK

## 2024-02-12 ASSESSMENT — PAIN DESCRIPTION - DESCRIPTORS: DESCRIPTORS: SHARP;SHOOTING

## 2024-02-12 NOTE — TELEPHONE ENCOUNTER
Kit stated he is still experiencing severe pain, it hurts for him to move and he said OTC pain meds aren't helping.   Wanted me to send a message out to see what the next steps would be for him?    Lumbar region on the left side/lower disk, is where the main focus is and the area that is giving him the most trouble. Level of pain Is kidney stone level he stated

## 2024-02-13 ENCOUNTER — OFFICE VISIT (OUTPATIENT)
Dept: PRIMARY CARE | Facility: CLINIC | Age: 79
End: 2024-02-13
Payer: MEDICARE

## 2024-02-13 VITALS
WEIGHT: 238 LBS | SYSTOLIC BLOOD PRESSURE: 134 MMHG | BODY MASS INDEX: 37.35 KG/M2 | DIASTOLIC BLOOD PRESSURE: 74 MMHG | HEART RATE: 68 BPM | OXYGEN SATURATION: 95 % | HEIGHT: 67 IN

## 2024-02-13 DIAGNOSIS — R10.9 ABDOMINAL PAIN, UNSPECIFIED ABDOMINAL LOCATION: Primary | ICD-10-CM

## 2024-02-13 DIAGNOSIS — S39.012A ACUTE MYOFASCIAL STRAIN OF LUMBAR REGION, INITIAL ENCOUNTER: ICD-10-CM

## 2024-02-13 LAB — HOLD SPECIMEN: NORMAL

## 2024-02-13 PROCEDURE — 1125F AMNT PAIN NOTED PAIN PRSNT: CPT | Performed by: FAMILY MEDICINE

## 2024-02-13 PROCEDURE — 99214 OFFICE O/P EST MOD 30 MIN: CPT | Performed by: FAMILY MEDICINE

## 2024-02-13 PROCEDURE — 1159F MED LIST DOCD IN RCRD: CPT | Performed by: FAMILY MEDICINE

## 2024-02-13 PROCEDURE — 3075F SYST BP GE 130 - 139MM HG: CPT | Performed by: FAMILY MEDICINE

## 2024-02-13 PROCEDURE — 3078F DIAST BP <80 MM HG: CPT | Performed by: FAMILY MEDICINE

## 2024-02-13 PROCEDURE — 1036F TOBACCO NON-USER: CPT | Performed by: FAMILY MEDICINE

## 2024-02-13 ASSESSMENT — ENCOUNTER SYMPTOMS: DEPRESSION: 1

## 2024-02-13 ASSESSMENT — PAIN SCALES - GENERAL: PAINLEVEL: 4

## 2024-02-13 NOTE — ED PROVIDER NOTES
HPI   Chief Complaint   Patient presents with    Back Pain     LEFT LOWER BACK PAIN X10 DAYS, IS 7/10, DOES NOT GO DOWN LEGS, DENIES LOSS OF BOWEL/BLADDER, DOES HAVE LIMITED ROM DUE TO PAIN, HAS + PULSES, DENIES CP, SOB, N/V/D, HA, NUMBNESS/TINGLING       Patient is a 78-year-old male, medical history significant for hypertension, prior pancreatitis, history of back pain presents to the emergency department for worsening back pain.  Patient states its mostly left-sided.  He states over the past 10 days, his pain has gotten worse.  He has been trying over-the-counter remedies and oral analgesics with little improvement.  It does not radiate down his legs.  He denies any douglas trauma.  He has a history of kidney stones, but states this feels different.  Today, he was to the point where he had a difficult time walking secondary to pain.  He has no weakness of the lower extremities.                          [place] Coma Scale Score: 15                     Patient History   Past Medical History:   Diagnosis Date    Personal history of other diseases of the circulatory system 03/09/2021    History of hypertension    Personal history of other diseases of the digestive system 03/09/2021    History of pancreatitis    Personal history of other diseases of the musculoskeletal system and connective tissue 03/09/2021    History of back pain    Personal history of other diseases of the musculoskeletal system and connective tissue 03/09/2021    History of back pain    Personal history of other endocrine, nutritional and metabolic disease 03/09/2021    History of hypercholesterolemia     Past Surgical History:   Procedure Laterality Date    OTHER SURGICAL HISTORY  03/09/2021    Cervical vertebral fusion    OTHER SURGICAL HISTORY  03/09/2021    Colectomy    OTHER SURGICAL HISTORY  03/09/2021    Colonoscopy    OTHER SURGICAL HISTORY  03/09/2021    Skin biopsy    OTHER SURGICAL HISTORY  03/09/2021    Total Knee Replacement    SHOULDER  SURGERY  2021    Shoulder Surgery     Family History   Problem Relation Name Age of Onset    Dementia Mother      Hypertension Mother      Other (Senile dementia) Mother      Dementia Father      Diabetes Father      Hypertension Father      Mitral valve prolapse Father      Heart attack Father      Other (Senile dementia) Father       Social History     Tobacco Use    Smoking status: Former     Types: Cigarettes     Quit date:      Years since quittin.1     Passive exposure: Past    Smokeless tobacco: Never   Substance Use Topics    Alcohol use: Yes     Alcohol/week: 10.0 standard drinks of alcohol     Types: 10 Cans of beer per week    Drug use: Never       Physical Exam   ED Triage Vitals [24 191]   Temperature Heart Rate Respirations BP   37.2 °C (99 °F) 73 18 157/90      Pulse Ox Temp Source Heart Rate Source Patient Position   99 % Temporal -- Sitting      BP Location FiO2 (%)     Right arm --       Physical Exam  Vitals and nursing note reviewed.   Constitutional:       General: He is not in acute distress.     Appearance: He is well-developed.   HENT:      Head: Normocephalic and atraumatic.   Eyes:      Extraocular Movements: Extraocular movements intact.      Conjunctiva/sclera: Conjunctivae normal.      Pupils: Pupils are equal, round, and reactive to light.   Cardiovascular:      Rate and Rhythm: Normal rate and regular rhythm.      Heart sounds: No murmur heard.  Pulmonary:      Effort: Pulmonary effort is normal. No respiratory distress.      Breath sounds: Normal breath sounds.   Abdominal:      General: Abdomen is flat.      Palpations: Abdomen is soft.      Tenderness: There is no abdominal tenderness.   Musculoskeletal:         General: No swelling.      Cervical back: Neck supple.      Right lower leg: No edema.      Left lower leg: No edema.   Skin:     General: Skin is warm and dry.      Capillary Refill: Capillary refill takes less than 2 seconds.   Neurological:       General: No focal deficit present.      Mental Status: He is alert and oriented to person, place, and time.   Psychiatric:         Mood and Affect: Mood normal.         ED Course & MDM   Diagnoses as of 02/12/24 2211   Acute left-sided low back pain without sciatica       Medical Decision Making  Medical Decision Making: Patient presents emergency department with gradually worsening back pain over the past week.  He states it has been painful to walk.  The pain does not radiate down his legs.  He has not had any difficulty with bowel or bladder.  He has had prior neck surgery, but denies surgery on his lumbar spine.  On arrival, he is uncomfortable but no distress.  Metabolic workup was pursued.  Patient's labs are at his baseline.  He underwent CT imaging.  There is no definitive evidence of spinal fracture.  There is degenerative change.  He also has questionable ileus.  Patient states has been dealing with that since prior abdominal surgery.  With pain medication is feeling improved.  I did discuss options with the patient.  He wants to follow-up as an outpatient as he does have an appointment scheduled for tomorrow.  I feel this is reasonable.    Differential Diagnoses Considered: Lumbar strain, kidney stone, diverticulitis, bowel obstruction    Chronic Medical Conditions Significantly Affecting Care: History of prior back pain, history of kidney stone    External Records Reviewed: I reviewed recent and relevant outside records including: Outpatient primary care visit    Independent Interpretation of Studies:  I independently interpreted: CT obtained and reviewed    Escalation of Care:  Appropriate for outpatient management    Social Determinants of Health Significantly Affecting Care:  No social determinants    Prescription Drug Consideration: Analgesics    Diagnostic testing considered: Noncontributory            Procedure  Procedures     Elias Hand MD  02/12/24 2211

## 2024-02-13 NOTE — PROGRESS NOTES
"Subjective   Patient ID: Kit Franklin is a 78 y.o. male who presents for Back Pain (Left lower back pain. ) and Follow-up (ED follow up from last night. ).    HPI   Patient here with complaint of left lower back pain.  He was in the emergency room last night.  Workup including CAT scan revealed ileus.  Patient has a long history of GI problems predominated by diarrhea.  He has not had a bowel movement for over 24 hours.  Patient has a history of recurrent lower back pains and spasms.  No radicular symptoms.  Pain is amplified by movements.  He is eating and drinking.    Review of Systems    Objective   /74 (BP Location: Left arm, Patient Position: Sitting, BP Cuff Size: Adult)   Pulse 68   Ht 1.702 m (5' 7\")   Wt 108 kg (238 lb)   SpO2 95%   BMI 37.28 kg/m²     Physical Exam  Alert, pleasant and in no acute distress.  Heart: Regular rate and rhythm without murmur  Lungs: Clear to auscultation  Lower extremities: No edema  Abdomen: Mild to moderately distended.  Active bowel sounds.  No localized tenderness.  Back: No palpable tenderness but quite a bit of palpable tightness in the paraspinal muscles from the thoracic through the lumbar.  Patient with quite a bit of pain when his body is readjusted on the table.  Assessment/Plan   Problem List Items Addressed This Visit    None  Visit Diagnoses         Codes    Abdominal pain, unspecified abdominal location    -  Primary R10.9    Relevant Orders    Referral to Gastroenterology    Acute myofascial strain of lumbar region, initial encounter     S39.012A          Patient here with acute lower back pain.  I believe this is musculoskeletal but may be complicated by the fact he has some abdominal bloating and a possible early ileus on his CAT scan.  He does have active bowel sounds.  I am going to have him utilize a Dulcolax suppository to try to ensure good bowel movements.  Recommend a clear liquid diet for the next day or 2 until his bowels are moving.  We " will get a GI consult to follow-up on his history of colon surgery and chronic diarrhea and current situation.  For his back, discussed gentle therapeutic exercises.  Would like to see him and utilize the cyclobenzaprine over the oxycodone to minimize any chance of constipation.  He will keep in touch with us over the next 24 to 48 hours.

## 2024-02-20 ENCOUNTER — OFFICE VISIT (OUTPATIENT)
Dept: GASTROENTEROLOGY | Facility: CLINIC | Age: 79
End: 2024-02-20
Payer: MEDICARE

## 2024-02-20 VITALS
HEIGHT: 68 IN | SYSTOLIC BLOOD PRESSURE: 162 MMHG | HEART RATE: 65 BPM | WEIGHT: 234 LBS | BODY MASS INDEX: 35.46 KG/M2 | DIASTOLIC BLOOD PRESSURE: 96 MMHG

## 2024-02-20 DIAGNOSIS — D13.5 AMPULLARY ADENOMA: Primary | ICD-10-CM

## 2024-02-20 DIAGNOSIS — R10.9 ABDOMINAL PAIN, UNSPECIFIED ABDOMINAL LOCATION: ICD-10-CM

## 2024-02-20 PROCEDURE — 1036F TOBACCO NON-USER: CPT | Performed by: STUDENT IN AN ORGANIZED HEALTH CARE EDUCATION/TRAINING PROGRAM

## 2024-02-20 PROCEDURE — 1125F AMNT PAIN NOTED PAIN PRSNT: CPT | Performed by: STUDENT IN AN ORGANIZED HEALTH CARE EDUCATION/TRAINING PROGRAM

## 2024-02-20 PROCEDURE — 1160F RVW MEDS BY RX/DR IN RCRD: CPT | Performed by: STUDENT IN AN ORGANIZED HEALTH CARE EDUCATION/TRAINING PROGRAM

## 2024-02-20 PROCEDURE — 3080F DIAST BP >= 90 MM HG: CPT | Performed by: STUDENT IN AN ORGANIZED HEALTH CARE EDUCATION/TRAINING PROGRAM

## 2024-02-20 PROCEDURE — 1159F MED LIST DOCD IN RCRD: CPT | Performed by: STUDENT IN AN ORGANIZED HEALTH CARE EDUCATION/TRAINING PROGRAM

## 2024-02-20 PROCEDURE — 3077F SYST BP >= 140 MM HG: CPT | Performed by: STUDENT IN AN ORGANIZED HEALTH CARE EDUCATION/TRAINING PROGRAM

## 2024-02-20 PROCEDURE — 99214 OFFICE O/P EST MOD 30 MIN: CPT | Performed by: STUDENT IN AN ORGANIZED HEALTH CARE EDUCATION/TRAINING PROGRAM

## 2024-02-20 RX ORDER — ONDANSETRON HYDROCHLORIDE 2 MG/ML
4 INJECTION, SOLUTION INTRAVENOUS ONCE AS NEEDED
Status: CANCELLED | OUTPATIENT
Start: 2024-02-20

## 2024-02-20 RX ORDER — SODIUM, POTASSIUM,MAG SULFATES 17.5-3.13G
SOLUTION, RECONSTITUTED, ORAL ORAL
Qty: 2 EACH | Refills: 0 | Status: SHIPPED | OUTPATIENT
Start: 2024-02-20 | End: 2024-04-25 | Stop reason: ALTCHOICE

## 2024-02-20 RX ORDER — SODIUM CHLORIDE, SODIUM LACTATE, POTASSIUM CHLORIDE, CALCIUM CHLORIDE 600; 310; 30; 20 MG/100ML; MG/100ML; MG/100ML; MG/100ML
20 INJECTION, SOLUTION INTRAVENOUS CONTINUOUS
Status: CANCELLED | OUTPATIENT
Start: 2024-02-20

## 2024-02-20 NOTE — PROGRESS NOTES
Subjective     History of Present Illness:   Kit Franklin is a 78 y.o. male past medical history significant for an ampullary adenoma (last EGD who presents to GI clinic for evaluation of left sided abdominal pain which prompted an ED visit on 2/12/24 with CT demonstrating upstream distention of the bowels from prior sigmoid resection for diverticulosis. He has had similar findings in the past but has not had a recent colonoscopy. Denies any hematochezia. Has intermittent symptoms.      Past Medical History   has a past medical history of Personal history of other diseases of the circulatory system (03/09/2021), Personal history of other diseases of the digestive system (03/09/2021), Personal history of other diseases of the musculoskeletal system and connective tissue (03/09/2021), Personal history of other diseases of the musculoskeletal system and connective tissue (03/09/2021), and Personal history of other endocrine, nutritional and metabolic disease (03/09/2021).     Social History   reports that he quit smoking about 17 years ago. His smoking use included cigarettes. He has been exposed to tobacco smoke. He has never used smokeless tobacco. He reports current alcohol use of about 10.0 standard drinks of alcohol per week. He reports that he does not use drugs.     Family History  family history includes Dementia in his father and mother; Diabetes in his father; Heart attack in his father; Hypertension in his father and mother; Mitral valve prolapse in his father; Senile dementia in his father and mother.     Allergies  Allergies   Allergen Reactions    Adhesive Tape-Silicones Unknown    Lisinopril Cough    Meperidine Unknown       Medications  Current Outpatient Medications   Medication Instructions    cholestyramine-aspartame (Prevalite) 4 gram packet 4 g, oral, 2 times daily PRN    cyclobenzaprine (FLEXERIL) 10 mg, oral, 3 times daily PRN    fish oil concentrate (Omega-3) 120-180 mg capsule oral, Daily,  take 2 capsules    meloxicam (Mobic) 15 mg tablet 1 tablet, oral, Every morning    metoprolol succinate XL (TOPROL-XL) 50 mg, oral, Daily    MULTIVITAMIN ORAL oral    simvastatin (ZOCOR) 20 mg, oral, Daily    tamsulosin (Flomax) 0.4 mg 24 hr capsule 1 tablet, oral, Daily        Objective   Visit Vitals  BP (!) 162/96   Pulse 65      Physical Exam  Vitals reviewed.   Constitutional:       General: He is awake.   Pulmonary:      Effort: Pulmonary effort is normal.      Breath sounds: Normal breath sounds.   Neurological:      Mental Status: He is alert and oriented to person, place, and time.   Psychiatric:         Attention and Perception: Attention and perception normal.         Behavior: Behavior normal.         Assessment/Plan   Kit Franklin is a 78 y.o. male who presents to GI clinic for evaluation of abdominal pain with CT imaging demonstrating abnormalities in the region of the sigmoid colon at the site of prior surgical intervention.  I suspect the patient may have a possible stricture in the region causing his symptoms, benefit from a colonoscopy further evaluation. We discussed the risks associated with a colonoscopy including the risk of bleeding, infection, perforation, internal hemorrhage. Patient demonstrated understanding of the associated risks and willingness to proceed.     He is also due for an EGD for evaluation of the region of his ampullary adenoma. We discussed the risks associated with an EGD including the risk of bleeding, infection, perforation, internal hemorrhage. Patient demonstrated understanding of the associated risks and willingness to proceed.             Kemar Singleton MD

## 2024-03-26 ENCOUNTER — APPOINTMENT (OUTPATIENT)
Dept: GASTROENTEROLOGY | Facility: CLINIC | Age: 79
End: 2024-03-26
Payer: MEDICARE

## 2024-04-11 ENCOUNTER — APPOINTMENT (OUTPATIENT)
Dept: NEUROLOGY | Facility: CLINIC | Age: 79
End: 2024-04-11
Payer: MEDICARE

## 2024-04-25 ENCOUNTER — ANESTHESIA (OUTPATIENT)
Dept: GASTROENTEROLOGY | Facility: HOSPITAL | Age: 79
End: 2024-04-25
Payer: MEDICARE

## 2024-04-25 ENCOUNTER — ANESTHESIA EVENT (OUTPATIENT)
Dept: GASTROENTEROLOGY | Facility: HOSPITAL | Age: 79
End: 2024-04-25
Payer: MEDICARE

## 2024-04-25 ENCOUNTER — HOSPITAL ENCOUNTER (OUTPATIENT)
Dept: GASTROENTEROLOGY | Facility: HOSPITAL | Age: 79
Setting detail: OUTPATIENT SURGERY
Discharge: HOME | End: 2024-04-25
Payer: MEDICARE

## 2024-04-25 VITALS
OXYGEN SATURATION: 98 % | DIASTOLIC BLOOD PRESSURE: 82 MMHG | BODY MASS INDEX: 33.75 KG/M2 | WEIGHT: 222 LBS | SYSTOLIC BLOOD PRESSURE: 142 MMHG | RESPIRATION RATE: 16 BRPM | TEMPERATURE: 98.1 F | HEART RATE: 72 BPM

## 2024-04-25 DIAGNOSIS — D13.5 AMPULLARY ADENOMA: ICD-10-CM

## 2024-04-25 DIAGNOSIS — R10.9 ABDOMINAL PAIN, UNSPECIFIED ABDOMINAL LOCATION: ICD-10-CM

## 2024-04-25 PROCEDURE — 7100000009 HC PHASE TWO TIME - INITIAL BASE CHARGE

## 2024-04-25 PROCEDURE — 2500000005 HC RX 250 GENERAL PHARMACY W/O HCPCS: Performed by: NURSE ANESTHETIST, CERTIFIED REGISTERED

## 2024-04-25 PROCEDURE — 43251 EGD REMOVE LESION SNARE: CPT | Performed by: STUDENT IN AN ORGANIZED HEALTH CARE EDUCATION/TRAINING PROGRAM

## 2024-04-25 PROCEDURE — 2500000004 HC RX 250 GENERAL PHARMACY W/ HCPCS (ALT 636 FOR OP/ED): Performed by: STUDENT IN AN ORGANIZED HEALTH CARE EDUCATION/TRAINING PROGRAM

## 2024-04-25 PROCEDURE — 7100000010 HC PHASE TWO TIME - EACH INCREMENTAL 1 MINUTE

## 2024-04-25 PROCEDURE — 99100 ANES PT EXTEME AGE<1 YR&>70: CPT | Performed by: ANESTHESIOLOGY

## 2024-04-25 PROCEDURE — 3700000002 HC GENERAL ANESTHESIA TIME - EACH INCREMENTAL 1 MINUTE

## 2024-04-25 PROCEDURE — A45380 PR COLONOSCOPY,BIOPSY: Performed by: ANESTHESIOLOGY

## 2024-04-25 PROCEDURE — 3700000001 HC GENERAL ANESTHESIA TIME - INITIAL BASE CHARGE

## 2024-04-25 PROCEDURE — 2500000004 HC RX 250 GENERAL PHARMACY W/ HCPCS (ALT 636 FOR OP/ED): Performed by: NURSE ANESTHETIST, CERTIFIED REGISTERED

## 2024-04-25 PROCEDURE — 88305 TISSUE EXAM BY PATHOLOGIST: CPT | Performed by: PATHOLOGY

## 2024-04-25 PROCEDURE — 88305 TISSUE EXAM BY PATHOLOGIST: CPT | Mod: TC,PARLAB | Performed by: STUDENT IN AN ORGANIZED HEALTH CARE EDUCATION/TRAINING PROGRAM

## 2024-04-25 PROCEDURE — A45380 PR COLONOSCOPY,BIOPSY: Performed by: NURSE ANESTHETIST, CERTIFIED REGISTERED

## 2024-04-25 PROCEDURE — 45380 COLONOSCOPY AND BIOPSY: CPT | Performed by: STUDENT IN AN ORGANIZED HEALTH CARE EDUCATION/TRAINING PROGRAM

## 2024-04-25 RX ORDER — SODIUM CHLORIDE, SODIUM LACTATE, POTASSIUM CHLORIDE, CALCIUM CHLORIDE 600; 310; 30; 20 MG/100ML; MG/100ML; MG/100ML; MG/100ML
20 INJECTION, SOLUTION INTRAVENOUS CONTINUOUS
Status: DISCONTINUED | OUTPATIENT
Start: 2024-04-25 | End: 2024-04-26 | Stop reason: HOSPADM

## 2024-04-25 RX ORDER — ONDANSETRON HYDROCHLORIDE 2 MG/ML
4 INJECTION, SOLUTION INTRAVENOUS ONCE AS NEEDED
Status: DISCONTINUED | OUTPATIENT
Start: 2024-04-25 | End: 2024-04-26 | Stop reason: HOSPADM

## 2024-04-25 RX ORDER — PROPOFOL 10 MG/ML
INJECTION, EMULSION INTRAVENOUS CONTINUOUS PRN
Status: DISCONTINUED | OUTPATIENT
Start: 2024-04-25 | End: 2024-04-25

## 2024-04-25 RX ORDER — LIDOCAINE HCL/PF 100 MG/5ML
SYRINGE (ML) INTRAVENOUS AS NEEDED
Status: DISCONTINUED | OUTPATIENT
Start: 2024-04-25 | End: 2024-04-25

## 2024-04-25 RX ADMIN — PROPOFOL 110 MCG/KG/MIN: 10 INJECTION, EMULSION INTRAVENOUS at 11:12

## 2024-04-25 RX ADMIN — PROPOFOL 40000 MCG: 10 INJECTION, EMULSION INTRAVENOUS at 11:13

## 2024-04-25 RX ADMIN — LIDOCAINE HYDROCHLORIDE 80 MG: 20 INJECTION, SOLUTION INTRAVENOUS at 11:12

## 2024-04-25 RX ADMIN — SODIUM CHLORIDE, POTASSIUM CHLORIDE, SODIUM LACTATE AND CALCIUM CHLORIDE 20 ML/HR: 600; 310; 30; 20 INJECTION, SOLUTION INTRAVENOUS at 09:47

## 2024-04-25 SDOH — HEALTH STABILITY: MENTAL HEALTH: CURRENT SMOKER: 0

## 2024-04-25 ASSESSMENT — COLUMBIA-SUICIDE SEVERITY RATING SCALE - C-SSRS
1. IN THE PAST MONTH, HAVE YOU WISHED YOU WERE DEAD OR WISHED YOU COULD GO TO SLEEP AND NOT WAKE UP?: NO
6. HAVE YOU EVER DONE ANYTHING, STARTED TO DO ANYTHING, OR PREPARED TO DO ANYTHING TO END YOUR LIFE?: NO
2. HAVE YOU ACTUALLY HAD ANY THOUGHTS OF KILLING YOURSELF?: NO

## 2024-04-25 ASSESSMENT — PAIN SCALES - GENERAL
PAINLEVEL_OUTOF10: 0 - NO PAIN
PAIN_LEVEL: 0

## 2024-04-25 ASSESSMENT — PAIN - FUNCTIONAL ASSESSMENT: PAIN_FUNCTIONAL_ASSESSMENT: 0-10

## 2024-04-25 NOTE — ANESTHESIA POSTPROCEDURE EVALUATION
Patient: Kit Franklin    Procedure Summary       Date: 04/25/24 Room / Location: [facility]    Anesthesia Start: 1106 Anesthesia Stop:     Procedures:       EGD      COLONOSCOPY Diagnosis:       Abdominal pain, unspecified abdominal location      Ampullary adenoma    Scheduled Providers: Kemar Singleton MD Responsible Provider: Elias Diaz MD    Anesthesia Type: MAC ASA Status: 3            Anesthesia Type: MAC    Vitals Value Taken Time   /83 04/25/24 1207   Temp 36.5 04/25/24 1207   Pulse 89 04/25/24 1207   Resp 16 04/25/24 1207   SpO2 100 04/25/24 1207       Anesthesia Post Evaluation    Patient location during evaluation: PACU  Patient participation: complete - patient participated  Level of consciousness: awake and alert  Pain score: 0  Pain management: adequate  Airway patency: patent  Cardiovascular status: acceptable  Respiratory status: acceptable  Hydration status: acceptable  Postoperative Nausea and Vomiting: none        No notable events documented.

## 2024-04-25 NOTE — H&P
Procedure H&P    Patient Profile-Procedures  Name Kit Franklin  Date of Birth 1945  MRN [id number]  Address   [address] [place] [id number] [place] [address]    Primary Phone Number [phone]  Secondary Phone Number    Pal [place]of[place]    Procedure(s):  Procedures: EGD and Colonoscopy  Primary contact name and number   Extended Emergency Contact Information  Primary Emergency Contact: Tatyana Franklin  Address: [address] [place] [address]  Mobile Phone: [phone]  Relation: Spouse    General Health  Weight There were no vitals filed for this visit.  BMI There is no height or weight on file to calculate BMI.    Allergies  Allergies   Allergen Reactions    Adhesive Tape-Silicones Unknown    Lisinopril Cough    Meperidine Unknown       Past Medical History   Past Medical History:   Diagnosis Date    Personal history of other diseases of the circulatory system 03/09/2021    History of hypertension    Personal history of other diseases of the digestive system 03/09/2021    History of pancreatitis    Personal history of other diseases of the musculoskeletal system and connective tissue 03/09/2021    History of back pain    Personal history of other diseases of the musculoskeletal system and connective tissue 03/09/2021    History of back pain    Personal history of other endocrine, nutritional and metabolic disease 03/09/2021    History of hypercholesterolemia       Provider assessment  Diagnosis:  abnormal imaging, follow up of ampullary adenoma and Abdominal Pain  Medication Reviewed - yes  Prior to Admission medications    Medication Sig Start Date End Date Taking? Authorizing Provider   cholestyramine-aspartame (Prevalite) 4 gram packet Take 1 packet (4 g) by mouth 2 times a day as needed (diarrhea). 11/2/23 11/1/24 Yes Elias Morley, DO   fish oil concentrate (Omega-3) 120-180 mg capsule Take by mouth 1 (one) time each day. take 2 capsules   Yes Historical  Provider, MD   metoprolol succinate XL (Toprol-XL) 50 mg 24 hr tablet Take 1 tablet (50 mg) by mouth once daily. 12/7/23 12/6/24 Yes Elias Morley DO   MULTIVITAMIN ORAL Take by mouth.   Yes Historical Provider, MD   simvastatin (Zocor) 20 mg tablet Take 1 tablet (20 mg) by mouth once daily. 4/6/23  Yes Pal Red MD   tamsulosin (Flomax) 0.4 mg 24 hr capsule Take 1 tablet by mouth once daily. 4/2/23  Yes Historical Provider, MD   cyclobenzaprine (Flexeril) 10 mg tablet Take 1 tablet (10 mg) by mouth 3 times a day as needed for muscle spasms.  Patient not taking: Reported on 2/13/2024 2/12/24 4/12/24  Elias Morley DO   meloxicam (Mobic) 15 mg tablet Take 1 tablet (15 mg) by mouth once daily in the morning. 4/18/19   Historical Provider, MD   sodium,potassium,mag sulfates (Suprep) 17.5-3.13-1.6 gram recon soln solution Take as per included split prep instructions 2/20/24 4/25/24  Kemar Singleton MD       Physical Exam  There were no vitals filed for this visit.     General: A&Ox3, NAD.  HEENT: AT/NC.   CV: RRR. No murmur.  Resp: CTA bilaterally. No wheezing, rhonchi or rales.   GI: Soft, NT/ND. BSx4.  Extrem: No edema. Pulses intact.  Neuro: No focal deficits.   Psych: Normal mood and affect.      Procedure Plan - pre-procedural (re)assesment completed by physician:  discharge/transfer patient when discharge criteria met    Kemar Singleton MD  4/25/2024 9:46 AM

## 2024-04-25 NOTE — ANESTHESIA PREPROCEDURE EVALUATION
Patient: Kit Franklin    Procedure Information       Date/Time: 04/25/24 1030    Scheduled providers: Kemar Singleton MD    Procedures:       EGD      COLONOSCOPY    Location: [facility]            Relevant Problems   Anesthesia (within normal limits)      Cardiac   (+) Benign essential HTN   (+) Hyperlipemia   (+) RBBB   (+) Systolic murmur      Neuro   (+) Lumbar radiculopathy      GI   (+) Irritable bowel syndrome with diarrhea      Endocrine   (+) Obesity      Musculoskeletal   (+) Facet degeneration of lumbar region   (+) Osteoarthritis      HEENT   (+) Bilateral hearing loss      ID   (+) COVID-19       Clinical information reviewed:    Allergies  Meds               NPO Detail:  NPO/Void Status  Date of Last Liquid: 04/25/24  Time of Last Liquid: 0615  Date of Last Solid: 04/23/24         Physical Exam    Airway  Mallampati: II  TM distance: >3 FB  Neck ROM: full     Cardiovascular - normal exam     Dental - normal exam     Pulmonary - normal exam     Abdominal - normal exam             Anesthesia Plan    History of general anesthesia?: yes  History of complications of general anesthesia?: no    ASA 3     MAC     The patient is not a current smoker.  Patient was previously instructed to abstain from smoking on day of procedure.  Patient did not smoke on day of procedure.    intravenous induction   Anesthetic plan and risks discussed with patient.  Use of blood products discussed with patient who consented to blood products.    Plan discussed with CRNA.

## 2024-04-26 DIAGNOSIS — E78.5 DYSLIPIDEMIA: ICD-10-CM

## 2024-04-26 RX ORDER — SIMVASTATIN 20 MG/1
20 TABLET, FILM COATED ORAL DAILY
Qty: 90 TABLET | Refills: 2 | Status: SHIPPED | OUTPATIENT
Start: 2024-04-26

## 2024-04-30 LAB
LABORATORY COMMENT REPORT: NORMAL
PATH REPORT.COMMENTS IMP SPEC: NORMAL
PATH REPORT.FINAL DX SPEC: NORMAL
PATH REPORT.GROSS SPEC: NORMAL
PATH REPORT.RELEVANT HX SPEC: NORMAL
PATH REPORT.TOTAL CANCER: NORMAL
RESIDENT REVIEW: NORMAL

## 2024-05-01 ENCOUNTER — TELEPHONE (OUTPATIENT)
Dept: PRIMARY CARE | Facility: CLINIC | Age: 79
End: 2024-05-01

## 2024-05-01 ENCOUNTER — OFFICE VISIT (OUTPATIENT)
Dept: ORTHOPEDIC SURGERY | Facility: CLINIC | Age: 79
End: 2024-05-01
Payer: MEDICARE

## 2024-05-01 ENCOUNTER — HOSPITAL ENCOUNTER (OUTPATIENT)
Dept: RADIOLOGY | Facility: CLINIC | Age: 79
Discharge: HOME | End: 2024-05-01
Payer: MEDICARE

## 2024-05-01 DIAGNOSIS — M16.11 PRIMARY OSTEOARTHRITIS OF RIGHT HIP: ICD-10-CM

## 2024-05-01 DIAGNOSIS — M16.11 PRIMARY OSTEOARTHRITIS OF RIGHT HIP: Primary | ICD-10-CM

## 2024-05-01 DIAGNOSIS — Z01.818 PREOP GENERAL PHYSICAL EXAM: ICD-10-CM

## 2024-05-01 DIAGNOSIS — T14.90XA ORTHOPEDIC TRAUMA: ICD-10-CM

## 2024-05-01 DIAGNOSIS — Z98.890 HISTORY OF MAJOR ORTHOPEDIC SURGERY: ICD-10-CM

## 2024-05-01 PROCEDURE — 73502 X-RAY EXAM HIP UNI 2-3 VIEWS: CPT | Mod: RT

## 2024-05-01 PROCEDURE — 1159F MED LIST DOCD IN RCRD: CPT | Performed by: ORTHOPAEDIC SURGERY

## 2024-05-01 PROCEDURE — 1160F RVW MEDS BY RX/DR IN RCRD: CPT | Performed by: ORTHOPAEDIC SURGERY

## 2024-05-01 PROCEDURE — 73502 X-RAY EXAM HIP UNI 2-3 VIEWS: CPT | Mod: RIGHT SIDE | Performed by: RADIOLOGY

## 2024-05-01 PROCEDURE — 99213 OFFICE O/P EST LOW 20 MIN: CPT | Performed by: ORTHOPAEDIC SURGERY

## 2024-05-01 RX ORDER — SODIUM CHLORIDE, SODIUM LACTATE, POTASSIUM CHLORIDE, CALCIUM CHLORIDE 600; 310; 30; 20 MG/100ML; MG/100ML; MG/100ML; MG/100ML
100 INJECTION, SOLUTION INTRAVENOUS CONTINUOUS
Status: CANCELLED | OUTPATIENT
Start: 2024-06-04

## 2024-05-01 RX ORDER — CEFAZOLIN SODIUM 2 G/100ML
2 INJECTION, SOLUTION INTRAVENOUS ONCE
Status: CANCELLED | OUTPATIENT
Start: 2024-06-04 | End: 2024-05-01

## 2024-05-01 NOTE — PROGRESS NOTES
Subjective    Patient ID: Kit Franklin is a 78 y.o. male.    Chief Complaint: Follow-up of the Right Hip     Last Surgery: No surgery found  Last Surgery Date: No surgery found    HPI  Is a 78-year-old male who comes in for follow-up of his right hip arthritis.  He has treated this conservatively in the past.  He states however now that his symptoms have progressed to the point where he is considering surgery.  He denies any trauma to his right lower extremity.  He currently denies numbness and paresthesias.    Objective   Ortho Exam  Patient is in no acute distress.  He does walk with an antalgic gait favoring his right lower extremity.  Exam of his right leg reveals he has a well-healed incision from her previous knee replacement.  He complains of pain more with internal rotation of his hip as opposed to external rotation of the hip.  He also has more pain with flexion beyond 90 degrees.  He is otherwise neurovascular intact in his right lower extremity.    Image Results:  X-rays of his right hip and pelvis were personally reviewed today.  He does have nearly bone-on-bone arthritic changes at the joint itself.  There is no fracture or dislocation noted.    Assessment/Plan   Encounter Diagnoses:  Primary osteoarthritis of right hip    Preop general physical exam    History of major orthopedic surgery    Orthopedic trauma    Orders Placed This Encounter    Request for Pre-Admission Testing Visit    Enroll patient in posterior total hip replacement care plan    Staphylococcus aureus/MRSA colonization, Culture    XR hip right with pelvis when performed 2 or 3 views    Urinalysis with Reflex Culture and Microscopic    Basic Metabolic Panel    CBC    Coagulation Screen    Type And Screen    ECG 12 lead    Case Request Operating Room: Arthroplasty Total Hip Posterior Approach     Sorry patient has known right hip arthritis.  He has tried conservative management but now is symptomatic enough that he would like to discuss  surgery.  I discussed with him in detail the risk, benefits alternatives of a right total hip replacement.  The patient voiced understanding and informed consent was obtained.  The patient will call to schedule surgery.

## 2024-05-01 NOTE — TELEPHONE ENCOUNTER
Pt stopped in and states that he needs a medical clearance letter so that he can get his knee replacement surgery in May. Medical clearance would need to be faxed to [phone].

## 2024-05-13 ENCOUNTER — TELEPHONE (OUTPATIENT)
Dept: GASTROENTEROLOGY | Facility: HOSPITAL | Age: 79
End: 2024-05-13
Payer: MEDICARE

## 2024-05-17 RX ORDER — CHLORHEXIDINE GLUCONATE 40 MG/ML
SOLUTION TOPICAL DAILY
Qty: 118 ML | Refills: 0 | Status: SHIPPED | OUTPATIENT
Start: 2024-05-17 | End: 2024-05-22

## 2024-05-17 RX ORDER — CHLORHEXIDINE GLUCONATE ORAL RINSE 1.2 MG/ML
15 SOLUTION DENTAL AS NEEDED
Qty: 120 ML | Refills: 0 | Status: SHIPPED | OUTPATIENT
Start: 2024-05-17 | End: 2024-06-06 | Stop reason: HOSPADM

## 2024-05-21 ENCOUNTER — PRE-ADMISSION TESTING (OUTPATIENT)
Dept: PREADMISSION TESTING | Facility: HOSPITAL | Age: 79
End: 2024-05-21
Payer: MEDICARE

## 2024-05-21 ENCOUNTER — HOSPITAL ENCOUNTER (OUTPATIENT)
Dept: RADIOLOGY | Facility: HOSPITAL | Age: 79
Discharge: HOME | End: 2024-05-21
Payer: MEDICARE

## 2024-05-21 VITALS
OXYGEN SATURATION: 97 % | HEART RATE: 70 BPM | BODY MASS INDEX: 34.45 KG/M2 | RESPIRATION RATE: 20 BRPM | WEIGHT: 227.29 LBS | SYSTOLIC BLOOD PRESSURE: 143 MMHG | DIASTOLIC BLOOD PRESSURE: 80 MMHG | TEMPERATURE: 96.8 F | HEIGHT: 68 IN

## 2024-05-21 DIAGNOSIS — M16.11 PRIMARY OSTEOARTHRITIS OF RIGHT HIP: ICD-10-CM

## 2024-05-21 DIAGNOSIS — T14.90XA ORTHOPEDIC TRAUMA: ICD-10-CM

## 2024-05-21 DIAGNOSIS — Z01.818 PREOP GENERAL PHYSICAL EXAM: ICD-10-CM

## 2024-05-21 DIAGNOSIS — Z96.641 HISTORY OF RIGHT HIP REPLACEMENT: ICD-10-CM

## 2024-05-21 DIAGNOSIS — Z01.818 PRE-OP TESTING: Primary | ICD-10-CM

## 2024-05-21 DIAGNOSIS — Z98.890 HISTORY OF MAJOR ORTHOPEDIC SURGERY: ICD-10-CM

## 2024-05-21 LAB
ABO GROUP (TYPE) IN BLOOD: NORMAL
ANION GAP SERPL CALC-SCNC: 11 MMOL/L (ref 10–20)
ANTIBODY SCREEN: NORMAL
APPEARANCE UR: CLEAR
APTT PPP: 34 SECONDS (ref 27–38)
BILIRUB UR STRIP.AUTO-MCNC: NEGATIVE MG/DL
BUN SERPL-MCNC: 20 MG/DL (ref 6–23)
CALCIUM SERPL-MCNC: 9.4 MG/DL (ref 8.6–10.3)
CHLORIDE SERPL-SCNC: 110 MMOL/L (ref 98–107)
CO2 SERPL-SCNC: 22 MMOL/L (ref 21–32)
COLOR UR: YELLOW
CREAT SERPL-MCNC: 0.84 MG/DL (ref 0.5–1.3)
EGFRCR SERPLBLD CKD-EPI 2021: 90 ML/MIN/1.73M*2
ERYTHROCYTE [DISTWIDTH] IN BLOOD BY AUTOMATED COUNT: 13.6 % (ref 11.5–14.5)
GLUCOSE SERPL-MCNC: 100 MG/DL (ref 74–99)
GLUCOSE UR STRIP.AUTO-MCNC: NORMAL MG/DL
HCT VFR BLD AUTO: 39.4 % (ref 41–52)
HGB BLD-MCNC: 13.3 G/DL (ref 13.5–17.5)
HOLD SPECIMEN: NORMAL
INR PPP: 1 (ref 0.9–1.1)
KETONES UR STRIP.AUTO-MCNC: NEGATIVE MG/DL
LEUKOCYTE ESTERASE UR QL STRIP.AUTO: NEGATIVE
MCH RBC QN AUTO: 32.5 PG (ref 26–34)
MCHC RBC AUTO-ENTMCNC: 33.8 G/DL (ref 32–36)
MCV RBC AUTO: 96 FL (ref 80–100)
NITRITE UR QL STRIP.AUTO: NEGATIVE
NRBC BLD-RTO: 0 /100 WBCS (ref 0–0)
PH UR STRIP.AUTO: 5 [PH]
PLATELET # BLD AUTO: 129 X10*3/UL (ref 150–450)
POTASSIUM SERPL-SCNC: 4.4 MMOL/L (ref 3.5–5.3)
PROT UR STRIP.AUTO-MCNC: NEGATIVE MG/DL
PROTHROMBIN TIME: 11.5 SECONDS (ref 9.8–12.8)
RBC # BLD AUTO: 4.09 X10*6/UL (ref 4.5–5.9)
RBC # UR STRIP.AUTO: NEGATIVE /UL
RH FACTOR (ANTIGEN D): NORMAL
SODIUM SERPL-SCNC: 139 MMOL/L (ref 136–145)
SP GR UR STRIP.AUTO: 1.02
UROBILINOGEN UR STRIP.AUTO-MCNC: NORMAL MG/DL
WBC # BLD AUTO: 5.7 X10*3/UL (ref 4.4–11.3)

## 2024-05-21 PROCEDURE — 93005 ELECTROCARDIOGRAM TRACING: CPT

## 2024-05-21 PROCEDURE — 85730 THROMBOPLASTIN TIME PARTIAL: CPT | Mod: 91

## 2024-05-21 PROCEDURE — 85027 COMPLETE CBC AUTOMATED: CPT

## 2024-05-21 PROCEDURE — 73502 X-RAY EXAM HIP UNI 2-3 VIEWS: CPT | Mod: RT

## 2024-05-21 PROCEDURE — 80048 BASIC METABOLIC PNL TOTAL CA: CPT

## 2024-05-21 PROCEDURE — 36415 COLL VENOUS BLD VENIPUNCTURE: CPT

## 2024-05-21 PROCEDURE — 87081 CULTURE SCREEN ONLY: CPT | Mod: PARLAB

## 2024-05-21 PROCEDURE — 81003 URINALYSIS AUTO W/O SCOPE: CPT

## 2024-05-21 PROCEDURE — 93010 ELECTROCARDIOGRAM REPORT: CPT | Performed by: STUDENT IN AN ORGANIZED HEALTH CARE EDUCATION/TRAINING PROGRAM

## 2024-05-21 PROCEDURE — 86901 BLOOD TYPING SEROLOGIC RH(D): CPT

## 2024-05-21 ASSESSMENT — DUKE ACTIVITY SCORE INDEX (DASI)
CAN YOU RUN A SHORT DISTANCE: NO
CAN YOU PARTICIPATE IN MODERATE RECREATIONAL ACTIVITIES LIKE GOLF, BOWLING, DANCING, DOUBLES TENNIS OR THROWING A BASEBALL OR FOOTBALL: YES
CAN YOU WALK INDOORS, SUCH AS AROUND YOUR HOUSE: YES
CAN YOU CLIMB A FLIGHT OF STAIRS OR WALK UP A HILL: YES
CAN YOU WALK A BLOCK OR TWO ON LEVEL GROUND: YES
CAN YOU DO MODERATE WORK AROUND THE HOUSE LIKE VACUUMING, SWEEPING FLOORS OR CARRYING GROCERIES: YES
CAN YOU TAKE CARE OF YOURSELF (EAT, DRESS, BATHE, OR USE TOILET): YES
CAN YOU PARTICIPATE IN STRENOUS SPORTS LIKE SWIMMING, SINGLES TENNIS, FOOTBALL, BASKETBALL, OR SKIING: NO
CAN YOU HAVE SEXUAL RELATIONS: NO
CAN YOU DO LIGHT WORK AROUND THE HOUSE LIKE DUSTING OR WASHING DISHES: YES
CAN YOU DO HEAVY WORK AROUND THE HOUSE LIKE SCRUBBING FLOORS OR LIFTING AND MOVING HEAVY FURNITURE: YES

## 2024-05-21 ASSESSMENT — PAIN - FUNCTIONAL ASSESSMENT: PAIN_FUNCTIONAL_ASSESSMENT: 0-10

## 2024-05-21 ASSESSMENT — PAIN SCALES - GENERAL: PAINLEVEL_OUTOF10: 0 - NO PAIN

## 2024-05-21 NOTE — CPM/PAT H&P
"CPM/PAT Evaluation       Name: Kit Franklin (Kit Franklin)  /Age: 1945/78 y.o.     In-Person       Chief Complaint: Right hip OA    78 yr old male with c/o Right hip OA.  Reports ongoing progressive pain worsened by activity including walking, standing still, taking steps and rising from sitting position.  Pain is constant ache to groin/hip accompanied by \"incredible soreness\" with varying degrees of intensity. Has tried injections and physical therapy with no lasting relief. Reports remaining otherwise moderately active, still golfing but needing to use golf cart as can only walk short distances; denies cardiac or respiratory symptoms.  Denies past issues with anesthesia. Patient did voice concern regarding positioning during surgery related to lumbar back problems; encouraged patient to voice concerns to surgical team.       Followed by PCP (DAVI Morley) - last visit 2024        Past Medical History:   Diagnosis Date    Personal history of other diseases of the circulatory system 2021    History of hypertension    Personal history of other diseases of the digestive system 2021    History of pancreatitis    Personal history of other diseases of the musculoskeletal system and connective tissue 2021    History of back pain    Personal history of other diseases of the musculoskeletal system and connective tissue 2021    History of back pain    Personal history of other endocrine, nutritional and metabolic disease 2021    History of hypercholesterolemia       Past Surgical History:   Procedure Laterality Date    OTHER SURGICAL HISTORY  2021    Cervical vertebral fusion    OTHER SURGICAL HISTORY  2021    Colectomy    OTHER SURGICAL HISTORY  2021    Colonoscopy    OTHER SURGICAL HISTORY  2021    Skin biopsy    OTHER SURGICAL HISTORY  2021    Total Knee Replacement    SHOULDER SURGERY  2021    Shoulder Surgery       Patient  has no history " on file for sexual activity.    Family History   Problem Relation Name Age of Onset    Dementia Mother      Hypertension Mother      Other (Senile dementia) Mother      Dementia Father      Diabetes Father      Hypertension Father      Mitral valve prolapse Father      Heart attack Father      Other (Senile dementia) Father         Allergies   Allergen Reactions    Adhesive Tape-Silicones Unknown    Lisinopril Cough    Meperidine Unknown       Prior to Admission medications    Medication Sig Start Date End Date Taking? Authorizing Provider   chlorhexidine (Hibiclens) 4 % external liquid Apply topically once daily for 5 days. Begin using CHG for a total of 5 days before surgery Day 5 is the day of surgery See directions from the hospital 5/17/24 5/22/24  CHAPINCITO Lafleur   chlorhexidine (Peridex) 0.12 % solution Use 15 mL in the mouth or throat if needed for wound care (oral rinse the night before surgery and the morning on the day of surgery) for up to 2 doses. Swish in mouth and spit out 5/17/24   GOOD Lafleur-CNP   cholestyramine-aspartame (Prevalite) 4 gram packet Take 1 packet (4 g) by mouth 2 times a day as needed (diarrhea). 11/2/23 11/1/24  Elias Morley,    cyclobenzaprine (Flexeril) 10 mg tablet Take 1 tablet (10 mg) by mouth 3 times a day as needed for muscle spasms.  Patient not taking: Reported on 2/13/2024 2/12/24 4/12/24  Elias Morley DO   fish oil concentrate (Omega-3) 120-180 mg capsule Take by mouth 1 (one) time each day. take 2 capsules    Historical Provider, MD   meloxicam (Mobic) 15 mg tablet Take 1 tablet (15 mg) by mouth once daily in the morning. 4/18/19   Historical Provider, MD   metoprolol succinate XL (Toprol-XL) 50 mg 24 hr tablet Take 1 tablet (50 mg) by mouth once daily. 12/7/23 12/6/24  Elias Morley DO   MULTIVITAMIN ORAL Take by mouth.    Historical Provider, MD   simvastatin (Zocor) 20 mg tablet Take 1 tablet (20 mg) by mouth once daily.  4/26/24   Elias Morley, DO   tamsulosin (Flomax) 0.4 mg 24 hr capsule Take 1 tablet by mouth once daily. 4/2/23   Historical Provider, MD        Review of Systems    Constitutional: no fever, no chills and not feeling poorly.   Eyes: no eyesight problems.   ENT: [place], no nosebleeds and no sore throat.   Cardiovascular: no chest pain, no palpitations and no extremity edema.   Respiratory: no shortness of breath, no wheezing, no cough and no sob with exertion.   Gastrointestinal: negative for abdominal pain, blood in stools or changes in bowel habits   Genitourinary: negative for dysuria, incontinence or changes in urinary habits   Musculoskeletal: see HPI   Integumentary: negative for lesions, rash or itching.   Neurological: negative for confusion, dizziness, fainting or difficulty walking.   Psychiatric: not suicidal, no anxiety and no depression.   All other systems have been reviewed and are negative for complaint.     Physical Exam  Constitutional:       General: No acute distress.     Aox3, pleasant and cooperative, appropriate mood and eye contact   HENT:      Head: Normocephalic.      Mouth/Throat: Mucous membranes moist & pink  Eyes:      Vision grossly intact, PERRLA   Neck:      No carotid bruit, no JVD  Cardiovascular:      Irregular, S1S2, rubs or gallops  Pulmonary:      Symmetric chest expansion, CTA, Room Air  Abdominal:      Soft non-tender, BSx4   Skin:     Warm, dry & intact   Extremities:      No gross deformities; antalgic gait   Neurological:      No focal deficit, Aox3, BONILLA x4  Psychiatric:      Pleasant & cooperative, appropriate affect    PAT AIRWAY:   Airway:      Three cervical disc fusion    Mallampati::  I    TM distance::  >3 FB    Neck ROM::  Limited   caps      Visit Vitals  /80   Pulse 70   Temp 36 °C (96.8 °F) (Temporal)   Resp 20       DASI Risk Score    No data to display       Caprini DVT Assessment    No data to display       Modified Frailty Index    No data to  display       CHADS2 Stroke Risk  Current as of 7 minutes ago        N/A 3 to 100%: High Risk   2 to < 3%: Medium Risk   0 to < 2%: Low Risk     Last Change: N/A          This score determines the patient's risk of having a stroke if the patient has atrial fibrillation.        This score is not applicable to this patient. Components are not calculated.          Revised Cardiac Risk Index    No data to display       Apfel Simplified Score    No data to display       Risk Analysis Index Results This Encounter    No data found in the last 1 encounters.       Stop Bang Score      Flowsheet Row Most Recent Value   Do you snore loudly? 1   Do you often feel tired or fatigued after your sleep? 0   Has anyone ever observed you stop breathing in your sleep? 0   Do you have or are you being treated for high blood pressure? 1   Recent BMI (Calculated) 34.6   Is BMI greater than 35 kg/m2? 0=No   Age older than 50 years old? 1=Yes   Is your neck circumference greater than 17 inches (Male) or 16 inches (Female)? 1   Gender - Male 1=Yes   STOP-BANG Total Score 5            Assessment and Plan:     Followed by ortho, Right hip OA    Right total hip replacement w/Dr Kat on 6/4/2024    Reviewed todays ecg - RBBB    Medical clearance provided (Peyman, 5/2/2024)

## 2024-05-21 NOTE — H&P (VIEW-ONLY)
"CPM/PAT Evaluation       Name: Kit Franklin (Kit Franklin)  /Age: 1945/78 y.o.     In-Person       Chief Complaint: Right hip OA    78 yr old male with c/o Right hip OA.  Reports ongoing progressive pain worsened by activity including walking, standing still, taking steps and rising from sitting position.  Pain is constant ache to groin/hip accompanied by \"incredible soreness\" with varying degrees of intensity. Has tried injections and physical therapy with no lasting relief. Reports remaining otherwise moderately active, still golfing but needing to use golf cart as can only walk short distances; denies cardiac or respiratory symptoms.  Denies past issues with anesthesia. Patient did voice concern regarding positioning during surgery related to lumbar back problems; encouraged patient to voice concerns to surgical team.       Followed by PCP (DAVI Morley) - last visit 2024        Past Medical History:   Diagnosis Date    Personal history of other diseases of the circulatory system 2021    History of hypertension    Personal history of other diseases of the digestive system 2021    History of pancreatitis    Personal history of other diseases of the musculoskeletal system and connective tissue 2021    History of back pain    Personal history of other diseases of the musculoskeletal system and connective tissue 2021    History of back pain    Personal history of other endocrine, nutritional and metabolic disease 2021    History of hypercholesterolemia       Past Surgical History:   Procedure Laterality Date    OTHER SURGICAL HISTORY  2021    Cervical vertebral fusion    OTHER SURGICAL HISTORY  2021    Colectomy    OTHER SURGICAL HISTORY  2021    Colonoscopy    OTHER SURGICAL HISTORY  2021    Skin biopsy    OTHER SURGICAL HISTORY  2021    Total Knee Replacement    SHOULDER SURGERY  2021    Shoulder Surgery       Patient  has no history " on file for sexual activity.    Family History   Problem Relation Name Age of Onset    Dementia Mother      Hypertension Mother      Other (Senile dementia) Mother      Dementia Father      Diabetes Father      Hypertension Father      Mitral valve prolapse Father      Heart attack Father      Other (Senile dementia) Father         Allergies   Allergen Reactions    Adhesive Tape-Silicones Unknown    Lisinopril Cough    Meperidine Unknown       Prior to Admission medications    Medication Sig Start Date End Date Taking? Authorizing Provider   chlorhexidine (Hibiclens) 4 % external liquid Apply topically once daily for 5 days. Begin using CHG for a total of 5 days before surgery Day 5 is the day of surgery See directions from the hospital 5/17/24 5/22/24  CHAPINCITO Lafleur   chlorhexidine (Peridex) 0.12 % solution Use 15 mL in the mouth or throat if needed for wound care (oral rinse the night before surgery and the morning on the day of surgery) for up to 2 doses. Swish in mouth and spit out 5/17/24   GOOD Lafleur-CNP   cholestyramine-aspartame (Prevalite) 4 gram packet Take 1 packet (4 g) by mouth 2 times a day as needed (diarrhea). 11/2/23 11/1/24  Elias Morley,    cyclobenzaprine (Flexeril) 10 mg tablet Take 1 tablet (10 mg) by mouth 3 times a day as needed for muscle spasms.  Patient not taking: Reported on 2/13/2024 2/12/24 4/12/24  Elias Morley DO   fish oil concentrate (Omega-3) 120-180 mg capsule Take by mouth 1 (one) time each day. take 2 capsules    Historical Provider, MD   meloxicam (Mobic) 15 mg tablet Take 1 tablet (15 mg) by mouth once daily in the morning. 4/18/19   Historical Provider, MD   metoprolol succinate XL (Toprol-XL) 50 mg 24 hr tablet Take 1 tablet (50 mg) by mouth once daily. 12/7/23 12/6/24  Elias Morley DO   MULTIVITAMIN ORAL Take by mouth.    Historical Provider, MD   simvastatin (Zocor) 20 mg tablet Take 1 tablet (20 mg) by mouth once daily.  4/26/24   Elias Morley, DO   tamsulosin (Flomax) 0.4 mg 24 hr capsule Take 1 tablet by mouth once daily. 4/2/23   Historical Provider, MD        Review of Systems    Constitutional: no fever, no chills and not feeling poorly.   Eyes: no eyesight problems.   ENT: [place], no nosebleeds and no sore throat.   Cardiovascular: no chest pain, no palpitations and no extremity edema.   Respiratory: no shortness of breath, no wheezing, no cough and no sob with exertion.   Gastrointestinal: negative for abdominal pain, blood in stools or changes in bowel habits   Genitourinary: negative for dysuria, incontinence or changes in urinary habits   Musculoskeletal: see HPI   Integumentary: negative for lesions, rash or itching.   Neurological: negative for confusion, dizziness, fainting or difficulty walking.   Psychiatric: not suicidal, no anxiety and no depression.   All other systems have been reviewed and are negative for complaint.     Physical Exam  Constitutional:       General: No acute distress.     Aox3, pleasant and cooperative, appropriate mood and eye contact   HENT:      Head: Normocephalic.      Mouth/Throat: Mucous membranes moist & pink  Eyes:      Vision grossly intact, PERRLA   Neck:      No carotid bruit, no JVD  Cardiovascular:      Irregular, S1S2, rubs or gallops  Pulmonary:      Symmetric chest expansion, CTA, Room Air  Abdominal:      Soft non-tender, BSx4   Skin:     Warm, dry & intact   Extremities:      No gross deformities; antalgic gait   Neurological:      No focal deficit, Aox3, BONILLA x4  Psychiatric:      Pleasant & cooperative, appropriate affect    PAT AIRWAY:   Airway:      Three cervical disc fusion    Mallampati::  I    TM distance::  >3 FB    Neck ROM::  Limited   caps      Visit Vitals  /80   Pulse 70   Temp 36 °C (96.8 °F) (Temporal)   Resp 20       DASI Risk Score    No data to display       Caprini DVT Assessment    No data to display       Modified Frailty Index    No data to  display       CHADS2 Stroke Risk  Current as of 7 minutes ago        N/A 3 to 100%: High Risk   2 to < 3%: Medium Risk   0 to < 2%: Low Risk     Last Change: N/A          This score determines the patient's risk of having a stroke if the patient has atrial fibrillation.        This score is not applicable to this patient. Components are not calculated.          Revised Cardiac Risk Index    No data to display       Apfel Simplified Score    No data to display       Risk Analysis Index Results This Encounter    No data found in the last 1 encounters.       Stop Bang Score      Flowsheet Row Most Recent Value   Do you snore loudly? 1   Do you often feel tired or fatigued after your sleep? 0   Has anyone ever observed you stop breathing in your sleep? 0   Do you have or are you being treated for high blood pressure? 1   Recent BMI (Calculated) 34.6   Is BMI greater than 35 kg/m2? 0=No   Age older than 50 years old? 1=Yes   Is your neck circumference greater than 17 inches (Male) or 16 inches (Female)? 1   Gender - Male 1=Yes   STOP-BANG Total Score 5            Assessment and Plan:     Followed by ortho, Right hip OA    Right total hip replacement w/Dr Kat on 6/4/2024    Reviewed todays ecg - RBBB    Medical clearance provided ([facility]yman, 5/2/2024)

## 2024-05-21 NOTE — PREPROCEDURE INSTRUCTIONS
Medication List            Accurate as of May 21, 2024 10:25 AM. Always use your most recent med list.                * chlorhexidine 0.12 % solution  Commonly known as: Peridex  Use 15 mL in the mouth or throat if needed for wound care (oral rinse the night before surgery and the morning on the day of surgery) for up to 2 doses. Swish in mouth and spit out     * chlorhexidine 4 % external liquid  Commonly known as: Hibiclens  Apply topically once daily for 5 days. Begin using CHG for a total of 5 days before surgery Day 5 is the day of surgery See directions from the hospital     cyclobenzaprine 10 mg tablet  Commonly known as: Flexeril  Take 1 tablet (10 mg) by mouth 3 times a day as needed for muscle spasms.  Medication Adjustments for Surgery: Other (Comment)  Notes to patient: Continue as prescribed     fish oil concentrate 120-180 mg capsule  Commonly known as: Omega-3  Medication Adjustments for Surgery: Stop 7 days before surgery     meloxicam 15 mg tablet  Commonly known as: Mobic  Medication Adjustments for Surgery: Stop 7 days before surgery     metoprolol succinate XL 50 mg 24 hr tablet  Commonly known as: Toprol-XL  Take 1 tablet (50 mg) by mouth once daily.  Medication Adjustments for Surgery: Take morning of surgery with sip of water, no other fluids     MULTIVITAMIN ORAL  Medication Adjustments for Surgery: Stop 7 days before surgery     Prevalite 4 gram packet  Generic drug: cholestyramine-aspartame  Take 1 packet (4 g) by mouth 2 times a day as needed (diarrhea).  Medication Adjustments for Surgery: Stop 1 day before surgery     simvastatin 20 mg tablet  Commonly known as: Zocor  Take 1 tablet (20 mg) by mouth once daily.  Medication Adjustments for Surgery: Take morning of surgery with sip of water, no other fluids     tamsulosin 0.4 mg 24 hr capsule  Commonly known as: Flomax  Medication Adjustments for Surgery: Take morning of surgery with sip of water, no other fluids           * This list  has 2 medication(s) that are the same as other medications prescribed for you. Read the directions carefully, and ask your doctor or other care provider to review them with you.                                  NPO Instructions:    Do not eat any food after midnight the night before your surgery/procedure.    Additional Instructions:     Day of Surgery:  Wear  comfortable loose fitting clothing  Do not use moisturizers, creams, lotions or perfume  All jewelry and valuables should be left at home        PRE-OPERATIVE INSTRUCTIONS FOR SURGERY    *Do not eat anything after midnight the night of surgery.  This includes food of any kind (including hard candy, cough drops, mints).   You may have up to 13.5 ounces of clear liquid  until TWO hours prior to your arrival time to the hospital.  This includes water, black tea/coffee, (no milk or cream) apple juice and electrolyte drinks (GATORADE).  You may chew gum until TWO hours prior you your surgery/procedure.         *One of our staff members will call you ONE business day before your surgery, between 11 am-2 pm to let you know the time to arrive.  If you have not received a call by 2 pm, call [phone]  *When you arrive at the hospital-->GO TO Registration on the ground floor  *Stop smoking 24 hours prior to surgery.  No Marijuana, CBD Oil or Vaping for 48 hours  *No alcohol 24 hours prior to surgery  *You will need a responsible adult to drive you home  -No acrylic nails or nail polish on at least one fingernail, NO polish on toes for foot surgery  -You may be asked to remove your dentures, partial plate, eyeglasses or contact lenses before going to surgery.  Please bring a case for these items.  -Body piercings need to be removed.  Jewelry and valuables should be left at home.  -Put on loose,  comfortable, clean clothing, that will accommodate bandages        What is a home antibacterial shower?  This shower is a way of cleaning the skin with a germ killing solution  before surgery.  The solution contains chlorhexidine, commonly known as CHG.  CHG is a skin cleanser with germ killing ability.  Let your doctor know if you are allergic to chlorhexidine.    Why do I need to take a preoperative antibacterial shower?  Skin is not sterile.  It is best to try to make your skin as free of germs as possible before surgery.  Proper cleansing with a germ killing soap before surgery can lower the number of germs on your skin.  This helps to reduce the risk of infection at the surgical site.  Following the instructions listed below will help you prepare your skin for surgery.      How do I use the solution?    Steps: Begin using your CHG soap 5 days before your surgery on __________________.    *First, wash and rinse your hair using the CHG soap.  Keep CHG soap away from ear canals and eyes.   Rinse completely, do not condition.  Hair extensions should be removed.    *Wash your face with your normal soap and rinse.   *Apply the CHG solution to a clean wet washcloth.  Turn the water off or move away from the water spray to avoid premature rinsing of the CHG soap as you are applying.  Firmly lather your entire body from the neck down.  Do not use on your face.    *Pay special attention to the area(s) where your incision(s) will be located unless they are on your face.  Avoid scrubbing your skin too hard.  The important part is to have the CHG soap sit on your skin for 3 minutes.   *When the 3 minutes are up, turn on the water and rinse the CHG solution off your body completely.  *Do not wash with regular soap after you  have  used the CHG soap solution.  *Pat  yourself dry with a clean, freshly laundered towel.  *Do not apply powders, deodorants or lotions.  *Dress in clean freshly laundered night clothes.    *Be sure to sleep with clean freshly laundered sheets.    *Be aware the CHG will cause stains on fabrics; if you wash them with bleach after use.  Rinse your washcloth and other linens  that have contact with CHG completely.  Use only non-chlorine detergents to launder the items  used.  *The morning of surgery is the fifth day.  Repeat the above steps and dress in clean comfortable clothing.     What is oral/dental rinse?  It is mouthwash.  It is a way of cleaning the he mouth with a germ-killing solution before your surgery.  The solution contains chlorhexidine, commonly known as CHG.  It is used inside the mouth to kill a bacteria known as Staphylococcus aureus.  Let your doctor know if you are allergic to Chlorhexidine.    Why do I need to use CHG oral/ dental rinse?  The CHG oral/dental rinse helps to kill bacteria in your mouth know as Staphylococcus aureus.  This reduces the risk of infection at the surgical site.    Using your CHG oral/dental rinse    STEPS:    Use your CHG oral/dental rinse after you brush your teeth the night before (at bedtime) and the morning of your surgery.  Follow all the directions on your prescription label.  *Use the cap on the container to measure 15 ml  *Swish (gargle if you can) the mouthwash in your mouth for at least 30 seconds, (do not swallow) and spit out  *After you use your CHG rinse, do not rinse your mouth with water, drink or eat.  Please refer to the prescription label for the appropriate time to resume oral intake.    What side effects might I have using the CHG oral/dental rinse?  CHG rinse will stick you plaque on the teeth.  Brush and floss just before use.   Teeth brushing will help avoid staining of the plaque during  use.  Teeth brushing will help avoid staining of plaque during  use.    Who should I contact if I have questions about the CHG oral/dental rinse and or CHG soap?  Please call [facility], Pre-Admission testing at [phone] if you have any questions.    What you may be asked to bring to surgery:  __x_Crutches, walker  ___CPAP machine  ___Urine specimen

## 2024-05-23 LAB — STAPHYLOCOCCUS SPEC CULT: NORMAL

## 2024-05-28 LAB
ATRIAL RATE: 65 BPM
P AXIS: 5 DEGREES
P OFFSET: 193 MS
P ONSET: 161 MS
PR INTERVAL: 118 MS
Q ONSET: 220 MS
QRS COUNT: 11 BEATS
QRS DURATION: 128 MS
QT INTERVAL: 416 MS
QTC CALCULATION(BAZETT): 432 MS
QTC FREDERICIA: 427 MS
R AXIS: 74 DEGREES
T AXIS: 8 DEGREES
T OFFSET: 428 MS
VENTRICULAR RATE: 65 BPM

## 2024-05-30 DIAGNOSIS — M16.11 PRIMARY OSTEOARTHRITIS OF RIGHT HIP: Primary | ICD-10-CM

## 2024-06-04 ENCOUNTER — HOSPITAL ENCOUNTER (OUTPATIENT)
Facility: HOSPITAL | Age: 79
Discharge: NURSING, REHAB OR LONG-TERM CARE | DRG: 948 | End: 2024-06-06
Attending: ORTHOPAEDIC SURGERY | Admitting: ORTHOPAEDIC SURGERY
Payer: MEDICARE

## 2024-06-04 ENCOUNTER — APPOINTMENT (OUTPATIENT)
Dept: RADIOLOGY | Facility: HOSPITAL | Age: 79
DRG: 948 | End: 2024-06-04
Payer: MEDICARE

## 2024-06-04 ENCOUNTER — ANESTHESIA (OUTPATIENT)
Dept: OPERATING ROOM | Facility: HOSPITAL | Age: 79
End: 2024-06-04
Payer: MEDICARE

## 2024-06-04 ENCOUNTER — ANESTHESIA EVENT (OUTPATIENT)
Dept: OPERATING ROOM | Facility: HOSPITAL | Age: 79
End: 2024-06-04
Payer: MEDICARE

## 2024-06-04 DIAGNOSIS — M16.11 PRIMARY OSTEOARTHRITIS OF RIGHT HIP: Primary | ICD-10-CM

## 2024-06-04 PROCEDURE — 3600000017 HC OR TIME - EACH INCREMENTAL 1 MINUTE - PROCEDURE LEVEL SIX: Performed by: ORTHOPAEDIC SURGERY

## 2024-06-04 PROCEDURE — C1713 ANCHOR/SCREW BN/BN,TIS/BN: HCPCS | Performed by: ORTHOPAEDIC SURGERY

## 2024-06-04 PROCEDURE — 3700000001 HC GENERAL ANESTHESIA TIME - INITIAL BASE CHARGE: Performed by: ORTHOPAEDIC SURGERY

## 2024-06-04 PROCEDURE — A4217 STERILE WATER/SALINE, 500 ML: HCPCS | Performed by: ORTHOPAEDIC SURGERY

## 2024-06-04 PROCEDURE — 2500000004 HC RX 250 GENERAL PHARMACY W/ HCPCS (ALT 636 FOR OP/ED): Performed by: ANESTHESIOLOGIST ASSISTANT

## 2024-06-04 PROCEDURE — 27130 TOTAL HIP ARTHROPLASTY: CPT | Performed by: ORTHOPAEDIC SURGERY

## 2024-06-04 PROCEDURE — 2500000005 HC RX 250 GENERAL PHARMACY W/O HCPCS: Performed by: ANESTHESIOLOGIST ASSISTANT

## 2024-06-04 PROCEDURE — 2500000005 HC RX 250 GENERAL PHARMACY W/O HCPCS: Performed by: ORTHOPAEDIC SURGERY

## 2024-06-04 PROCEDURE — C1776 JOINT DEVICE (IMPLANTABLE): HCPCS | Performed by: ORTHOPAEDIC SURGERY

## 2024-06-04 PROCEDURE — 3600000018 HC OR TIME - INITIAL BASE CHARGE - PROCEDURE LEVEL SIX: Performed by: ORTHOPAEDIC SURGERY

## 2024-06-04 PROCEDURE — 73501 X-RAY EXAM HIP UNI 1 VIEW: CPT | Mod: RT,76

## 2024-06-04 PROCEDURE — 7100000011 HC EXTENDED STAY RECOVERY HOURLY - NURSING UNIT

## 2024-06-04 PROCEDURE — 2500000004 HC RX 250 GENERAL PHARMACY W/ HCPCS (ALT 636 FOR OP/ED): Performed by: ANESTHESIOLOGY

## 2024-06-04 PROCEDURE — 88305 TISSUE EXAM BY PATHOLOGIST: CPT | Mod: TC,PARLAB | Performed by: ORTHOPAEDIC SURGERY

## 2024-06-04 PROCEDURE — 3700000002 HC GENERAL ANESTHESIA TIME - EACH INCREMENTAL 1 MINUTE: Performed by: ORTHOPAEDIC SURGERY

## 2024-06-04 PROCEDURE — 2500000004 HC RX 250 GENERAL PHARMACY W/ HCPCS (ALT 636 FOR OP/ED): Mod: JZ | Performed by: ORTHOPAEDIC SURGERY

## 2024-06-04 PROCEDURE — 97161 PT EVAL LOW COMPLEX 20 MIN: CPT | Mod: GP

## 2024-06-04 PROCEDURE — 2500000001 HC RX 250 WO HCPCS SELF ADMINISTERED DRUGS (ALT 637 FOR MEDICARE OP): Performed by: ANESTHESIOLOGY

## 2024-06-04 PROCEDURE — 73501 X-RAY EXAM HIP UNI 1 VIEW: CPT | Mod: RT,MUE

## 2024-06-04 PROCEDURE — 2500000001 HC RX 250 WO HCPCS SELF ADMINISTERED DRUGS (ALT 637 FOR MEDICARE OP): Performed by: ORTHOPAEDIC SURGERY

## 2024-06-04 PROCEDURE — 73501 X-RAY EXAM HIP UNI 1 VIEW: CPT | Mod: RIGHT SIDE | Performed by: RADIOLOGY

## 2024-06-04 PROCEDURE — 2500000002 HC RX 250 W HCPCS SELF ADMINISTERED DRUGS (ALT 637 FOR MEDICARE OP, ALT 636 FOR OP/ED): Mod: MUE | Performed by: ORTHOPAEDIC SURGERY

## 2024-06-04 PROCEDURE — 88311 DECALCIFY TISSUE: CPT | Mod: TC,PARLAB | Performed by: ORTHOPAEDIC SURGERY

## 2024-06-04 PROCEDURE — 73501 X-RAY EXAM HIP UNI 1 VIEW: CPT | Mod: RT

## 2024-06-04 PROCEDURE — 7100000002 HC RECOVERY ROOM TIME - EACH INCREMENTAL 1 MINUTE: Performed by: ORTHOPAEDIC SURGERY

## 2024-06-04 PROCEDURE — G0378 HOSPITAL OBSERVATION PER HR: HCPCS

## 2024-06-04 PROCEDURE — 2780000003 HC OR 278 NO HCPCS: Performed by: ORTHOPAEDIC SURGERY

## 2024-06-04 PROCEDURE — 2500000004 HC RX 250 GENERAL PHARMACY W/ HCPCS (ALT 636 FOR OP/ED): Performed by: ORTHOPAEDIC SURGERY

## 2024-06-04 PROCEDURE — 2720000007 HC OR 272 NO HCPCS: Performed by: ORTHOPAEDIC SURGERY

## 2024-06-04 PROCEDURE — 7100000001 HC RECOVERY ROOM TIME - INITIAL BASE CHARGE: Performed by: ORTHOPAEDIC SURGERY

## 2024-06-04 DEVICE — IMPLANTABLE DEVICE: Type: IMPLANTABLE DEVICE | Site: HIP | Status: NON-FUNCTIONAL

## 2024-06-04 DEVICE — PINNACLE GRIPTION ACETABULAR SHELL SECTOR 54MM OD
Type: IMPLANTABLE DEVICE | Site: HIP | Status: FUNCTIONAL
Brand: PINNACLE GRIPTION

## 2024-06-04 DEVICE — APEX HOLE ELIMINATOR - PS
Type: IMPLANTABLE DEVICE | Site: HIP | Status: FUNCTIONAL
Brand: APEX

## 2024-06-04 DEVICE — PINNACLE HIP SOLUTIONS ALTRX POLYETHYLENE ACETABULAR LINER +4 10 DEGREE 36MM ID 54MM OD
Type: IMPLANTABLE DEVICE | Site: HIP | Status: FUNCTIONAL
Brand: PINNACLE ALTRX

## 2024-06-04 DEVICE — CORAIL HIP SYSTEM CEMENTLESS FEMORAL STEM 12/14 AMT 135 DEGREES KA SIZE 12 HA COATED STANDARD COLLAR
Type: IMPLANTABLE DEVICE | Site: HIP | Status: FUNCTIONAL
Brand: CORAIL

## 2024-06-04 DEVICE — BIOLOX DELTA CERAMIC FEMORAL HEAD +5.0 36MM DIA 12/14 TAPER
Type: IMPLANTABLE DEVICE | Site: HIP | Status: FUNCTIONAL
Brand: BIOLOX DELTA

## 2024-06-04 DEVICE — PINNACLE CANCELLOUS BONE SCREW 6.5MM X 25MM
Type: IMPLANTABLE DEVICE | Site: HIP | Status: FUNCTIONAL
Brand: PINNACLE

## 2024-06-04 RX ORDER — FENTANYL CITRATE 50 UG/ML
INJECTION, SOLUTION INTRAMUSCULAR; INTRAVENOUS AS NEEDED
Status: DISCONTINUED | OUTPATIENT
Start: 2024-06-04 | End: 2024-06-04

## 2024-06-04 RX ORDER — ASPIRIN 325 MG
325 TABLET, DELAYED RELEASE (ENTERIC COATED) ORAL 2 TIMES DAILY
Status: DISCONTINUED | OUTPATIENT
Start: 2024-06-04 | End: 2024-06-06 | Stop reason: HOSPADM

## 2024-06-04 RX ORDER — SODIUM CHLORIDE 0.9 G/100ML
IRRIGANT IRRIGATION AS NEEDED
Status: DISCONTINUED | OUTPATIENT
Start: 2024-06-04 | End: 2024-06-04 | Stop reason: HOSPADM

## 2024-06-04 RX ORDER — IPRATROPIUM BROMIDE 0.5 MG/2.5ML
500 SOLUTION RESPIRATORY (INHALATION) ONCE
Status: DISCONTINUED | OUTPATIENT
Start: 2024-06-04 | End: 2024-06-04 | Stop reason: HOSPADM

## 2024-06-04 RX ORDER — ONDANSETRON HYDROCHLORIDE 2 MG/ML
4 INJECTION, SOLUTION INTRAVENOUS ONCE AS NEEDED
Status: DISCONTINUED | OUTPATIENT
Start: 2024-06-04 | End: 2024-06-04 | Stop reason: HOSPADM

## 2024-06-04 RX ORDER — MEPERIDINE HYDROCHLORIDE 25 MG/ML
12.5 INJECTION INTRAMUSCULAR; INTRAVENOUS; SUBCUTANEOUS EVERY 10 MIN PRN
Status: DISCONTINUED | OUTPATIENT
Start: 2024-06-04 | End: 2024-06-04 | Stop reason: HOSPADM

## 2024-06-04 RX ORDER — TAMSULOSIN HYDROCHLORIDE 0.4 MG/1
0.4 CAPSULE ORAL DAILY
Status: DISCONTINUED | OUTPATIENT
Start: 2024-06-04 | End: 2024-06-06 | Stop reason: HOSPADM

## 2024-06-04 RX ORDER — METOPROLOL SUCCINATE 50 MG/1
50 TABLET, EXTENDED RELEASE ORAL DAILY
Status: DISCONTINUED | OUTPATIENT
Start: 2024-06-04 | End: 2024-06-06 | Stop reason: HOSPADM

## 2024-06-04 RX ORDER — LIDOCAINE HCL/PF 100 MG/5ML
SYRINGE (ML) INTRAVENOUS AS NEEDED
Status: DISCONTINUED | OUTPATIENT
Start: 2024-06-04 | End: 2024-06-04

## 2024-06-04 RX ORDER — DOCUSATE SODIUM 100 MG/1
100 CAPSULE, LIQUID FILLED ORAL 2 TIMES DAILY
Status: DISCONTINUED | OUTPATIENT
Start: 2024-06-04 | End: 2024-06-06 | Stop reason: HOSPADM

## 2024-06-04 RX ORDER — SODIUM CHLORIDE, SODIUM LACTATE, POTASSIUM CHLORIDE, CALCIUM CHLORIDE 600; 310; 30; 20 MG/100ML; MG/100ML; MG/100ML; MG/100ML
100 INJECTION, SOLUTION INTRAVENOUS CONTINUOUS
Status: DISCONTINUED | OUTPATIENT
Start: 2024-06-04 | End: 2024-06-04 | Stop reason: HOSPADM

## 2024-06-04 RX ORDER — SODIUM CHLORIDE, SODIUM LACTATE, POTASSIUM CHLORIDE, CALCIUM CHLORIDE 600; 310; 30; 20 MG/100ML; MG/100ML; MG/100ML; MG/100ML
100 INJECTION, SOLUTION INTRAVENOUS CONTINUOUS
Status: ACTIVE | OUTPATIENT
Start: 2024-06-04 | End: 2024-06-05

## 2024-06-04 RX ORDER — ALBUTEROL SULFATE 0.83 MG/ML
2.5 SOLUTION RESPIRATORY (INHALATION) ONCE AS NEEDED
Status: DISCONTINUED | OUTPATIENT
Start: 2024-06-04 | End: 2024-06-04 | Stop reason: HOSPADM

## 2024-06-04 RX ORDER — ACETAMINOPHEN 325 MG/1
650 TABLET ORAL EVERY 6 HOURS SCHEDULED
Status: DISCONTINUED | OUTPATIENT
Start: 2024-06-04 | End: 2024-06-06 | Stop reason: HOSPADM

## 2024-06-04 RX ORDER — ONDANSETRON HYDROCHLORIDE 2 MG/ML
4 INJECTION, SOLUTION INTRAVENOUS EVERY 8 HOURS PRN
Status: DISCONTINUED | OUTPATIENT
Start: 2024-06-04 | End: 2024-06-06 | Stop reason: HOSPADM

## 2024-06-04 RX ORDER — ACETAMINOPHEN 325 MG/1
650 TABLET ORAL ONCE
Status: COMPLETED | OUTPATIENT
Start: 2024-06-04 | End: 2024-06-04

## 2024-06-04 RX ORDER — DIPHENHYDRAMINE HCL 12.5MG/5ML
12.5 LIQUID (ML) ORAL EVERY 6 HOURS PRN
Status: DISCONTINUED | OUTPATIENT
Start: 2024-06-04 | End: 2024-06-06 | Stop reason: HOSPADM

## 2024-06-04 RX ORDER — METOPROLOL TARTRATE 1 MG/ML
INJECTION, SOLUTION INTRAVENOUS AS NEEDED
Status: DISCONTINUED | OUTPATIENT
Start: 2024-06-04 | End: 2024-06-04

## 2024-06-04 RX ORDER — PHENYLEPHRINE HYDROCHLORIDE 10 MG/ML
INJECTION INTRAVENOUS AS NEEDED
Status: DISCONTINUED | OUTPATIENT
Start: 2024-06-04 | End: 2024-06-04

## 2024-06-04 RX ORDER — SIMVASTATIN 20 MG/1
20 TABLET, FILM COATED ORAL DAILY
Status: DISCONTINUED | OUTPATIENT
Start: 2024-06-04 | End: 2024-06-06 | Stop reason: HOSPADM

## 2024-06-04 RX ORDER — MORPHINE SULFATE 2 MG/ML
2 INJECTION, SOLUTION INTRAMUSCULAR; INTRAVENOUS EVERY 2 HOUR PRN
Status: DISCONTINUED | OUTPATIENT
Start: 2024-06-04 | End: 2024-06-06 | Stop reason: HOSPADM

## 2024-06-04 RX ORDER — SODIUM CHLORIDE, SODIUM LACTATE, POTASSIUM CHLORIDE, CALCIUM CHLORIDE 600; 310; 30; 20 MG/100ML; MG/100ML; MG/100ML; MG/100ML
100 INJECTION, SOLUTION INTRAVENOUS CONTINUOUS
Status: DISCONTINUED | OUTPATIENT
Start: 2024-06-04 | End: 2024-06-04

## 2024-06-04 RX ORDER — OXYCODONE AND ACETAMINOPHEN 10; 325 MG/1; MG/1
1 TABLET ORAL EVERY 4 HOURS PRN
Status: DISCONTINUED | OUTPATIENT
Start: 2024-06-04 | End: 2024-06-04 | Stop reason: SDUPTHER

## 2024-06-04 RX ORDER — GABAPENTIN 300 MG/1
300 CAPSULE ORAL ONCE
Status: COMPLETED | OUTPATIENT
Start: 2024-06-04 | End: 2024-06-04

## 2024-06-04 RX ORDER — CEFAZOLIN SODIUM 2 G/100ML
2 INJECTION, SOLUTION INTRAVENOUS ONCE
Status: COMPLETED | OUTPATIENT
Start: 2024-06-04 | End: 2024-06-04

## 2024-06-04 RX ORDER — CELECOXIB 100 MG/1
200 CAPSULE ORAL ONCE
Status: COMPLETED | OUTPATIENT
Start: 2024-06-04 | End: 2024-06-04

## 2024-06-04 RX ORDER — ROCURONIUM BROMIDE 10 MG/ML
INJECTION, SOLUTION INTRAVENOUS AS NEEDED
Status: DISCONTINUED | OUTPATIENT
Start: 2024-06-04 | End: 2024-06-04

## 2024-06-04 RX ORDER — OXYCODONE AND ACETAMINOPHEN 5; 325 MG/1; MG/1
1 TABLET ORAL EVERY 4 HOURS PRN
Status: DISCONTINUED | OUTPATIENT
Start: 2024-06-04 | End: 2024-06-05

## 2024-06-04 RX ORDER — PROPOFOL 10 MG/ML
INJECTION, EMULSION INTRAVENOUS AS NEEDED
Status: DISCONTINUED | OUTPATIENT
Start: 2024-06-04 | End: 2024-06-04

## 2024-06-04 RX ORDER — SODIUM CHLORIDE, SODIUM LACTATE, POTASSIUM CHLORIDE, CALCIUM CHLORIDE 600; 310; 30; 20 MG/100ML; MG/100ML; MG/100ML; MG/100ML
75 INJECTION, SOLUTION INTRAVENOUS CONTINUOUS
Status: DISCONTINUED | OUTPATIENT
Start: 2024-06-04 | End: 2024-06-04

## 2024-06-04 RX ORDER — NALOXONE HYDROCHLORIDE 1 MG/ML
0.2 INJECTION INTRAMUSCULAR; INTRAVENOUS; SUBCUTANEOUS EVERY 5 MIN PRN
Status: DISCONTINUED | OUTPATIENT
Start: 2024-06-04 | End: 2024-06-06 | Stop reason: HOSPADM

## 2024-06-04 RX ORDER — ONDANSETRON 4 MG/1
4 TABLET, FILM COATED ORAL EVERY 8 HOURS PRN
Status: DISCONTINUED | OUTPATIENT
Start: 2024-06-04 | End: 2024-06-06 | Stop reason: HOSPADM

## 2024-06-04 RX ORDER — ACETAMINOPHEN 325 MG/1
650 TABLET ORAL EVERY 4 HOURS PRN
Status: DISCONTINUED | OUTPATIENT
Start: 2024-06-04 | End: 2024-06-04 | Stop reason: HOSPADM

## 2024-06-04 RX ORDER — OXYCODONE AND ACETAMINOPHEN 5; 325 MG/1; MG/1
0.5 TABLET ORAL EVERY 4 HOURS PRN
Status: DISCONTINUED | OUTPATIENT
Start: 2024-06-04 | End: 2024-06-05

## 2024-06-04 RX ORDER — CEFAZOLIN SODIUM 2 G/100ML
2 INJECTION, SOLUTION INTRAVENOUS EVERY 8 HOURS
Status: COMPLETED | OUTPATIENT
Start: 2024-06-04 | End: 2024-06-05

## 2024-06-04 RX ORDER — ONDANSETRON HYDROCHLORIDE 2 MG/ML
4 INJECTION, SOLUTION INTRAVENOUS ONCE
Status: COMPLETED | OUTPATIENT
Start: 2024-06-04 | End: 2024-06-04

## 2024-06-04 RX ORDER — LIDOCAINE HYDROCHLORIDE 10 MG/ML
0.1 INJECTION INFILTRATION; PERINEURAL ONCE
Status: DISCONTINUED | OUTPATIENT
Start: 2024-06-04 | End: 2024-06-04 | Stop reason: HOSPADM

## 2024-06-04 RX ORDER — TRAMADOL HYDROCHLORIDE 50 MG/1
50 TABLET ORAL ONCE
Status: COMPLETED | OUTPATIENT
Start: 2024-06-04 | End: 2024-06-04

## 2024-06-04 RX ORDER — CHOLESTYRAMINE 4 G/4.8G
4 POWDER, FOR SUSPENSION ORAL 2 TIMES DAILY
Status: DISCONTINUED | OUTPATIENT
Start: 2024-06-04 | End: 2024-06-06 | Stop reason: HOSPADM

## 2024-06-04 RX ADMIN — METOPROLOL TARTRATE 5 MG: 5 INJECTION INTRAVENOUS at 10:28

## 2024-06-04 RX ADMIN — PHENYLEPHRINE HYDROCHLORIDE 200 MCG: 10 INJECTION INTRAVENOUS at 11:08

## 2024-06-04 RX ADMIN — Medication 2 L/MIN: at 15:00

## 2024-06-04 RX ADMIN — FENTANYL CITRATE 150 MCG: 50 INJECTION, SOLUTION INTRAMUSCULAR; INTRAVENOUS at 10:13

## 2024-06-04 RX ADMIN — PHENYLEPHRINE HYDROCHLORIDE 200 MCG: 10 INJECTION INTRAVENOUS at 10:31

## 2024-06-04 RX ADMIN — PHENYLEPHRINE HYDROCHLORIDE 200 MCG: 10 INJECTION INTRAVENOUS at 12:30

## 2024-06-04 RX ADMIN — SUGAMMADEX 400 MG: 100 INJECTION, SOLUTION INTRAVENOUS at 13:01

## 2024-06-04 RX ADMIN — MORPHINE SULFATE 2 MG: 2 INJECTION, SOLUTION INTRAMUSCULAR; INTRAVENOUS at 18:24

## 2024-06-04 RX ADMIN — OXYCODONE HYDROCHLORIDE AND ACETAMINOPHEN 1 TABLET: 5; 325 TABLET ORAL at 16:01

## 2024-06-04 RX ADMIN — HYDROMORPHONE HYDROCHLORIDE 0.5 MG: 1 INJECTION, SOLUTION INTRAMUSCULAR; INTRAVENOUS; SUBCUTANEOUS at 13:45

## 2024-06-04 RX ADMIN — TRAMADOL HYDROCHLORIDE 50 MG: 50 TABLET, COATED ORAL at 09:53

## 2024-06-04 RX ADMIN — ROCURONIUM BROMIDE 100 MG: 10 INJECTION, SOLUTION INTRAVENOUS at 10:13

## 2024-06-04 RX ADMIN — SIMVASTATIN 20 MG: 20 TABLET, FILM COATED ORAL at 18:00

## 2024-06-04 RX ADMIN — OXYCODONE HYDROCHLORIDE AND ACETAMINOPHEN 1 TABLET: 5; 325 TABLET ORAL at 20:06

## 2024-06-04 RX ADMIN — CHOLESTYRAMINE LIGHT 4 G: 4 POWDER, FOR SUSPENSION ORAL at 20:07

## 2024-06-04 RX ADMIN — SODIUM CHLORIDE, POTASSIUM CHLORIDE, SODIUM LACTATE AND CALCIUM CHLORIDE: 600; 310; 30; 20 INJECTION, SOLUTION INTRAVENOUS at 13:00

## 2024-06-04 RX ADMIN — ASPIRIN 325 MG: 325 TABLET, COATED ORAL at 20:07

## 2024-06-04 RX ADMIN — FENTANYL CITRATE 50 MCG: 50 INJECTION, SOLUTION INTRAMUSCULAR; INTRAVENOUS at 10:57

## 2024-06-04 RX ADMIN — PHENYLEPHRINE HYDROCHLORIDE 100 MCG: 10 INJECTION INTRAVENOUS at 10:54

## 2024-06-04 RX ADMIN — GABAPENTIN 300 MG: 300 CAPSULE ORAL at 09:53

## 2024-06-04 RX ADMIN — SODIUM CHLORIDE, POTASSIUM CHLORIDE, SODIUM LACTATE AND CALCIUM CHLORIDE: 600; 310; 30; 20 INJECTION, SOLUTION INTRAVENOUS at 11:02

## 2024-06-04 RX ADMIN — ROCURONIUM BROMIDE 10 MG: 10 INJECTION, SOLUTION INTRAVENOUS at 12:05

## 2024-06-04 RX ADMIN — CELECOXIB 200 MG: 100 CAPSULE ORAL at 09:52

## 2024-06-04 RX ADMIN — ROCURONIUM BROMIDE 10 MG: 10 INJECTION, SOLUTION INTRAVENOUS at 11:50

## 2024-06-04 RX ADMIN — ONDANSETRON 4 MG: 2 INJECTION, SOLUTION INTRAMUSCULAR; INTRAVENOUS at 12:40

## 2024-06-04 RX ADMIN — CEFAZOLIN SODIUM 2 G: 2 INJECTION, SOLUTION INTRAVENOUS at 17:37

## 2024-06-04 RX ADMIN — ROCURONIUM BROMIDE 10 MG: 10 INJECTION, SOLUTION INTRAVENOUS at 11:37

## 2024-06-04 RX ADMIN — ACETAMINOPHEN 650 MG: 325 TABLET ORAL at 09:53

## 2024-06-04 RX ADMIN — PHENYLEPHRINE HYDROCHLORIDE 200 MCG: 10 INJECTION INTRAVENOUS at 11:19

## 2024-06-04 RX ADMIN — PROPOFOL 200 MG: 10 INJECTION, EMULSION INTRAVENOUS at 10:13

## 2024-06-04 RX ADMIN — POVIDONE-IODINE 1 APPLICATION: 5 SOLUTION TOPICAL at 09:52

## 2024-06-04 RX ADMIN — SODIUM CHLORIDE, POTASSIUM CHLORIDE, SODIUM LACTATE AND CALCIUM CHLORIDE: 600; 310; 30; 20 INJECTION, SOLUTION INTRAVENOUS at 08:21

## 2024-06-04 RX ADMIN — LIDOCAINE HYDROCHLORIDE 100 MG: 20 INJECTION, SOLUTION INTRAVENOUS at 10:13

## 2024-06-04 RX ADMIN — FENTANYL CITRATE 50 MCG: 50 INJECTION, SOLUTION INTRAMUSCULAR; INTRAVENOUS at 13:00

## 2024-06-04 RX ADMIN — MORPHINE SULFATE 2 MG: 2 INJECTION, SOLUTION INTRAMUSCULAR; INTRAVENOUS at 22:54

## 2024-06-04 RX ADMIN — TAMSULOSIN HYDROCHLORIDE 0.4 MG: 0.4 CAPSULE ORAL at 17:37

## 2024-06-04 RX ADMIN — HYDROMORPHONE HYDROCHLORIDE 0.5 MG: 1 INJECTION, SOLUTION INTRAMUSCULAR; INTRAVENOUS; SUBCUTANEOUS at 13:31

## 2024-06-04 RX ADMIN — HYDROMORPHONE HYDROCHLORIDE 0.5 MG: 1 INJECTION, SOLUTION INTRAMUSCULAR; INTRAVENOUS; SUBCUTANEOUS at 13:21

## 2024-06-04 RX ADMIN — PHENYLEPHRINE HYDROCHLORIDE 200 MCG: 10 INJECTION INTRAVENOUS at 10:39

## 2024-06-04 RX ADMIN — ACETAMINOPHEN 650 MG: 325 TABLET ORAL at 17:37

## 2024-06-04 RX ADMIN — SODIUM CHLORIDE, POTASSIUM CHLORIDE, SODIUM LACTATE AND CALCIUM CHLORIDE 1000 ML: 600; 310; 30; 20 INJECTION, SOLUTION INTRAVENOUS at 09:52

## 2024-06-04 RX ADMIN — CEFAZOLIN SODIUM 2 G: 2 INJECTION, SOLUTION INTRAVENOUS at 10:20

## 2024-06-04 RX ADMIN — SODIUM CHLORIDE, POTASSIUM CHLORIDE, SODIUM LACTATE AND CALCIUM CHLORIDE 100 ML/HR: 600; 310; 30; 20 INJECTION, SOLUTION INTRAVENOUS at 13:13

## 2024-06-04 RX ADMIN — PHENYLEPHRINE HYDROCHLORIDE 200 MCG: 10 INJECTION INTRAVENOUS at 10:36

## 2024-06-04 RX ADMIN — SODIUM CHLORIDE, POTASSIUM CHLORIDE, SODIUM LACTATE AND CALCIUM CHLORIDE 100 ML/HR: 600; 310; 30; 20 INJECTION, SOLUTION INTRAVENOUS at 15:11

## 2024-06-04 SDOH — SOCIAL STABILITY: SOCIAL INSECURITY: ARE YOU OR HAVE YOU BEEN THREATENED OR ABUSED PHYSICALLY, EMOTIONALLY, OR SEXUALLY BY ANYONE?: NO

## 2024-06-04 SDOH — SOCIAL STABILITY: SOCIAL INSECURITY: ABUSE: ADULT

## 2024-06-04 SDOH — SOCIAL STABILITY: SOCIAL INSECURITY: DO YOU FEEL UNSAFE GOING BACK TO THE PLACE WHERE YOU ARE LIVING?: NO

## 2024-06-04 SDOH — SOCIAL STABILITY: SOCIAL INSECURITY: WERE YOU ABLE TO COMPLETE ALL THE BEHAVIORAL HEALTH SCREENINGS?: YES

## 2024-06-04 SDOH — SOCIAL STABILITY: SOCIAL INSECURITY: ARE THERE ANY APPARENT SIGNS OF INJURIES/BEHAVIORS THAT COULD BE RELATED TO ABUSE/NEGLECT?: NO

## 2024-06-04 SDOH — SOCIAL STABILITY: SOCIAL INSECURITY: DOES ANYONE TRY TO KEEP YOU FROM HAVING/CONTACTING OTHER FRIENDS OR DOING THINGS OUTSIDE YOUR HOME?: NO

## 2024-06-04 SDOH — SOCIAL STABILITY: SOCIAL INSECURITY: DO YOU FEEL ANYONE HAS EXPLOITED OR TAKEN ADVANTAGE OF YOU FINANCIALLY OR OF YOUR PERSONAL PROPERTY?: NO

## 2024-06-04 SDOH — HEALTH STABILITY: MENTAL HEALTH: CURRENT SMOKER: 0

## 2024-06-04 SDOH — SOCIAL STABILITY: SOCIAL INSECURITY: HAVE YOU HAD ANY THOUGHTS OF HARMING ANYONE ELSE?: NO

## 2024-06-04 SDOH — SOCIAL STABILITY: SOCIAL INSECURITY: HAVE YOU HAD THOUGHTS OF HARMING ANYONE ELSE?: NO

## 2024-06-04 SDOH — SOCIAL STABILITY: SOCIAL INSECURITY: HAS ANYONE EVER THREATENED TO HURT YOUR FAMILY OR YOUR PETS?: NO

## 2024-06-04 ASSESSMENT — PAIN DESCRIPTION - ORIENTATION
ORIENTATION: RIGHT

## 2024-06-04 ASSESSMENT — PAIN - FUNCTIONAL ASSESSMENT
PAIN_FUNCTIONAL_ASSESSMENT: 0-10

## 2024-06-04 ASSESSMENT — PAIN SCALES - GENERAL
PAINLEVEL_OUTOF10: 6
PAINLEVEL_OUTOF10: 8
PAINLEVEL_OUTOF10: 0 - NO PAIN
PAINLEVEL_OUTOF10: 7
PAINLEVEL_OUTOF10: 0 - NO PAIN
PAINLEVEL_OUTOF10: 0 - NO PAIN
PAINLEVEL_OUTOF10: 8
PAINLEVEL_OUTOF10: 7
PAINLEVEL_OUTOF10: 6
PAIN_LEVEL: 0
PAINLEVEL_OUTOF10: 8
PAINLEVEL_OUTOF10: 5 - MODERATE PAIN
PAINLEVEL_OUTOF10: 8
PAINLEVEL_OUTOF10: 6

## 2024-06-04 ASSESSMENT — COGNITIVE AND FUNCTIONAL STATUS - GENERAL
DRESSING REGULAR UPPER BODY CLOTHING: A LITTLE
PATIENT BASELINE BEDBOUND: NO
MOVING FROM LYING ON BACK TO SITTING ON SIDE OF FLAT BED WITH BEDRAILS: A LOT
DAILY ACTIVITIY SCORE: 18
WALKING IN HOSPITAL ROOM: TOTAL
MOVING TO AND FROM BED TO CHAIR: A LITTLE
DRESSING REGULAR LOWER BODY CLOTHING: A LITTLE
CLIMB 3 TO 5 STEPS WITH RAILING: TOTAL
TOILETING: A LITTLE
MOVING FROM LYING ON BACK TO SITTING ON SIDE OF FLAT BED WITH BEDRAILS: A LITTLE
WALKING IN HOSPITAL ROOM: A LITTLE
MOBILITY SCORE: 9
MOBILITY SCORE: 18
TURNING FROM BACK TO SIDE WHILE IN FLAT BAD: A LOT
STANDING UP FROM CHAIR USING ARMS: A LITTLE
PERSONAL GROOMING: A LITTLE
MOVING TO AND FROM BED TO CHAIR: TOTAL
EATING MEALS: A LITTLE
STANDING UP FROM CHAIR USING ARMS: A LOT
TURNING FROM BACK TO SIDE WHILE IN FLAT BAD: A LITTLE
CLIMB 3 TO 5 STEPS WITH RAILING: A LITTLE
HELP NEEDED FOR BATHING: A LITTLE

## 2024-06-04 ASSESSMENT — ACTIVITIES OF DAILY LIVING (ADL)
HEARING - RIGHT EAR: FUNCTIONAL
PATIENT'S MEMORY ADEQUATE TO SAFELY COMPLETE DAILY ACTIVITIES?: YES
HEARING - LEFT EAR: FUNCTIONAL
GROOMING: INDEPENDENT
ADEQUATE_TO_COMPLETE_ADL: YES
LACK_OF_TRANSPORTATION: NO
TOILETING: NEEDS ASSISTANCE
JUDGMENT_ADEQUATE_SAFELY_COMPLETE_DAILY_ACTIVITIES: YES
WALKS IN HOME: INDEPENDENT
FEEDING YOURSELF: INDEPENDENT
DRESSING YOURSELF: INDEPENDENT
BATHING: NEEDS ASSISTANCE

## 2024-06-04 ASSESSMENT — PAIN DESCRIPTION - DESCRIPTORS
DESCRIPTORS: TIGHTNESS
DESCRIPTORS: ACHING
DESCRIPTORS: TIGHTNESS

## 2024-06-04 ASSESSMENT — PATIENT HEALTH QUESTIONNAIRE - PHQ9
SUM OF ALL RESPONSES TO PHQ9 QUESTIONS 1 & 2: 0
2. FEELING DOWN, DEPRESSED OR HOPELESS: NOT AT ALL
1. LITTLE INTEREST OR PLEASURE IN DOING THINGS: NOT AT ALL

## 2024-06-04 ASSESSMENT — LIFESTYLE VARIABLES
AUDIT-C TOTAL SCORE: 1
HOW OFTEN DO YOU HAVE A DRINK CONTAINING ALCOHOL: MONTHLY OR LESS
PRESCIPTION_ABUSE_PAST_12_MONTHS: NO
SKIP TO QUESTIONS 9-10: 1
HOW OFTEN DO YOU HAVE 6 OR MORE DRINKS ON ONE OCCASION: NEVER
SUBSTANCE_ABUSE_PAST_12_MONTHS: NO
HOW MANY STANDARD DRINKS CONTAINING ALCOHOL DO YOU HAVE ON A TYPICAL DAY: 1 OR 2
AUDIT-C TOTAL SCORE: 1

## 2024-06-04 ASSESSMENT — PAIN DESCRIPTION - LOCATION
LOCATION: OTHER (COMMENT)
LOCATION: HIP

## 2024-06-04 NOTE — CARE PLAN
The patient's goals for the shift include      The clinical goals for the shift include Pain control      Problem: Pain - Adult  Goal: Verbalizes/displays adequate comfort level or baseline comfort level  Outcome: Progressing     Problem: Safety - Adult  Goal: Free from fall injury  Outcome: Progressing     Problem: Discharge Planning  Goal: Discharge to home or other facility with appropriate resources  Outcome: Progressing     Problem: Chronic Conditions and Co-morbidities  Goal: Patient's chronic conditions and co-morbidity symptoms are monitored and maintained or improved  Outcome: Progressing     Problem: Skin  Goal: Decreased wound size/increased tissue granulation at next dressing change  Outcome: Progressing  Goal: Participates in plan/prevention/treatment measures  Outcome: Progressing  Goal: Prevent/manage excess moisture  Outcome: Progressing  Goal: Prevent/minimize sheer/friction injuries  Outcome: Progressing  Goal: Promote/optimize nutrition  Outcome: Progressing  Goal: Promote skin healing  Outcome: Progressing     Problem: Pain  Goal: Takes deep breaths with improved pain control throughout the shift  Outcome: Progressing  Goal: Turns in bed with improved pain control throughout the shift  Outcome: Progressing  Goal: Walks with improved pain control throughout the shift  Outcome: Progressing  Goal: Performs ADL's with improved pain control throughout shift  Outcome: Progressing  Goal: Participates in PT with improved pain control throughout the shift  Outcome: Progressing  Goal: Free from opioid side effects throughout the shift  Outcome: Progressing  Goal: Free from acute confusion related to pain meds throughout the shift  Outcome: Progressing

## 2024-06-04 NOTE — PROGRESS NOTES
Physical Therapy    Physical Therapy Evaluation    Patient Name: Kit Franklin  MRN: [id number]  Today's Date: 6/4/2024   Time Calculation  Start Time: 1623  Stop Time: 1658  Time Calculation (min): 35 min  216/216-A    Assessment/Plan   PT Assessment  PT Assessment Results: Decreased strength, Impaired balance, Decreased mobility, Decreased safety awareness, Orthopedic restrictions, Pain  Rehab Prognosis: Good  Evaluation/Treatment Tolerance: Patient limited by fatigue, Patient limited by pain  Medical Staff Made Aware: Yes  End of Session Communication: Bedside nurse, PCT/NA/CTA  Assessment Comment: Continued skilled PT intervention indicated to facilitate increased strength, balance & gait stability  End of Session Patient Position: Bed, 2 rail up, Alarm off, not on at start of session (call light in reach, pillow between le's, hob elevated, scd's donned/activated, foot of bed locked in neutral)  IP OR SWING BED PT PLAN  Inpatient or Swing Bed: Inpatient  PT Plan  Treatment/Interventions: Bed mobility, Transfer training, Gait training, Stair training, Balance training, Therapeutic exercise, Therapeutic activity, Home exercise program  PT Plan: Skilled PT  PT Frequency: BID  PT Discharge Recommendations: Low intensity level of continued care (w/ 24 hr support for safe transition home postop)  PT Recommended Transfer Status: Assist x2  PT - OK to Discharge: Yes (to next level of care when cleared by medical team)    Subjective     Current Problem:  1. Primary osteoarthritis of right hip  Surgical Pathology Exam    Surgical Pathology Exam        Patient Active Problem List   Diagnosis    Arthralgia of hip, right    Arthritis of right hip    Benign essential HTN    Bilateral hearing loss    Bilateral impacted cerumen    Chronic back pain    Complaints of leg weakness    Concentration deficit    COVID-19    Diarrhea    Dysuria    Facet degeneration of lumbar region    Hip pain, right    History of claustrophobia     Hyperlipemia    Irritable bowel syndrome with diarrhea    Lipoma    Lumbar radiculopathy    Numbness    Obesity    Osteoarthritis    Pancreatic cyst (HHS-HCC)    Polyarthropathy    Psoriasis    RBBB    Right shoulder pain    Rotator cuff injury    Sacroiliitis (CMS-HCC)    Scalp lesion    Sigmoid volvulus (Multi)    Systolic murmur    Weakness of shoulder    Primary osteoarthritis of right hip       General Visit Information:  General  Reason for Referral: PT eval & treat/impaired mobility 6/4/24 rt thr posterior approach  Referred By: Tyler Kat MD  Family/Caregiver Present:  (spouse Tatyana present, supportive)  Caregiver Feedback: Per conference w/ RN patient stable to participate in therapy  Patient Position Received: Bed, 2 rail up, Alarm off, not on at start of session  General Comment: Pleasant & cooperative, receptive to mobility& instructions, anxious,  pain & progressive lethargy limit activity tolerance; a&ox4    Home Living:  Home Living  Home Living Comments: Lives w/ spouse who is home & able to assist; threshold step to enter split level home w/ up to 12steps w/ rail; tub shower w/ seat, grab bar on side of tub, hand held shower hose; raised toilet near sink or cabinet; standard bed but primarily sleeps in zero gravity recliner due to back issues    Prior Level of Function:  Prior Function Per Pt/Caregiver Report  Prior Function Comments: independent gait w/ recent use of cane prn, also owns ww; denies h/o falls in at least 1yr; independent adl w/ effort managing socks/shoes, owns reacher; iadl shared w/ spouse, both drive    Precautions:  Precautions  Precautions Comment: fall,rt thr posterior precautions abductor wedge,  wbat, O2, Yuhaaviatam, IV    Vital Signs:     Objective     Pain:  Pain Assessment  Pain Assessment:  (rt calf pain pt attritbutes to positioning rated 7/10; rt hip pain sx site 6/10)  General Assessments:    Sensation  Sensation Comment: denies nubmness/tingling   Functional  Assessments:     Bed Mobility  Bed Mobility:  (max assist x1 supine>sit, max assist x2 sit>supine)  Transfers  Transfer:  (mod assist x1 sit>stand from bed to ww w/ repeated cues for safe hand plcmt & assist to place le's at stable width, difficulty w/ forward wt shift & le extension)  Ambulation/Gait Training  Ambulation/Gait Training Performed:  (unable/unsafe, mod assist x1 w/ static stand [id number] using ww & gait belt, decreased arousal, unable to advance le's; attempted seated scoot at edge of bed w/ assist x2 but unable due to decreased arousal)   Extremity/Trunk Assessments:        RLE   RLE :  (supine aarom wfl w/ in postop precautions, strength: knee ext 3+/5 ankle df 3+/5)  LLE   LLE :  (arom wfl, strength grossly 4/5)    Outcome Measures:     [facility] Basic Mobility  Turning from your back to your side while in a flat bed without using bedrails: A lot  Moving from lying on your back to sitting on the side of a flat bed without using bedrails: A lot  Moving to and from bed to chair (including a wheelchair): Total  Standing up from a chair using your arms (e.g. wheelchair or bedside chair): A lot  To walk in hospital room: Total  Climbing 3-5 steps with railing: Total  Basic Mobility - Total Score: 9   Goals:  Encounter Problems       Encounter Problems (Active)       PT Problem       STG - Pt will transition supine <> sitting with min assist x1 while maintaining thr precautions (Progressing)       Start:  06/04/24    Expected End:  06/06/24            STG - Pt will transfer STS with sba  (Progressing)       Start:  06/04/24    Expected End:  06/06/24            STG - Pt will amb >=50' using ww with sba  (Progressing)       Start:  06/04/24    Expected End:  06/06/24            STG -  Pt will navigate >=4 stairs simulating home environment w/ step to step le pattern w/ cga (Progressing)       Start:  06/04/24    Expected End:  06/06/24            STG - Pt will perform a B LE postop thr program of 2-3 sets of  10 to facilitate increased functional mobility & gait stability (Progressing)       Start:  06/04/24    Expected End:  06/06/24               Pain - Adult            Education Documentation  Mobility Training, taught by Bella Cat PT at 6/4/2024  5:22 PM.  Learner: Significant Other, Patient  Readiness: Acceptance  Method: Explanation  Response: Verbalizes Understanding, Needs Reinforcement  Comment: safety, thr precautions, use of ww, activity progression

## 2024-06-04 NOTE — ANESTHESIA POSTPROCEDURE EVALUATION
Patient: Kit Franklin    Procedure Summary       Date: 06/04/24 Room / Location: [facility] 08 / [facility]    Anesthesia Start: 1007 Anesthesia Stop: 1316    Procedure: RIGHT TOTAL HIP REPLACEMENT (Right: Hip) Diagnosis:       Primary osteoarthritis of right hip      (Primary osteoarthritis of right hip [M16.11])    Surgeons: Tyler Kat MD Responsible Provider: Jam Galaviz MD    Anesthesia Type: general ASA Status: 3            Anesthesia Type: general    Vitals Value Taken Time   /73 06/04/24 1345   Temp 36.6 °C (97.9 °F) 06/04/24 1315   Pulse 88 06/04/24 1356   Resp 16 06/04/24 1345   SpO2 100 % 06/04/24 1356   Vitals shown include unfiled device data.    Anesthesia Post Evaluation    Patient location during evaluation: PACU  Patient participation: waiting for patient participation  Level of consciousness: sleepy but conscious  Pain score: 0  Pain management: adequate  Airway patency: patent  Cardiovascular status: acceptable and hemodynamically stable  Respiratory status: face mask  Hydration status: acceptable  Postoperative Nausea and Vomiting: none        No notable events documented.

## 2024-06-04 NOTE — OP NOTE
RIGHT TOTAL HIP REPLACEMENT (R) Operative Note     Date: 2024  OR Location: [facility] OR    Name: Kit Franklin, : 1945, Age: 78 y.o., MRN: [id number], Sex: male    Diagnosis  Pre-op Diagnosis     * Primary osteoarthritis of right hip [M16.11] Post-op Diagnosis     * Primary osteoarthritis of right hip [M16.11]     Procedures  RIGHT TOTAL HIP REPLACEMENT  65795 - [place] ARTHRP ACETBLR/PROX FEM PROSTC AGRFT/ALGRFT      Surgeons      * Tyler Kat - Primary    Resident/Fellow/Other Assistant:  Surgeons and Role:  * No surgeons found with a matching role *    Procedure Summary  Anesthesia: General  ASA: III  Anesthesia Staff: Anesthesiologist: Jam Galaviz MD  C-AA: LEEROY Cross  Estimated Blood Loss: 500 mL  Intra-op Medications:   Administrations occurring from 1135 to 1430 on 24:   Medication Name Total Dose   ondansetron (Zofran) injection 4 mg 4 mg              Anesthesia Record               Intraprocedure I/O Totals          Intake    LR bolus 1000.00 mL    Total Intake 1000 mL       Output    Est. Blood Loss 500 mL    Total Output 500 mL       Net    Net Volume 500 mL          Specimen:   ID Type Source Tests Collected by Time   1 : RIGHT HIP BONE & TISSUE Tissue HIP ARTHROPLASTY RIGHT SURGICAL PATHOLOGY EXAM Tyler Kat MD 2024 1054        Staff:   Circulator: Bree Moreno Person: Anuja Dhillon Circulator: [facility]         Drains and/or Catheters: * None in log *    Tourniquet Times:         Implants:  Implants       Type Name Action Serial No.      Screw PIN, STEINMANN, TATO ONE END, 3.6 MM X 22.9 CM, SS, STERILE - [id number] Used, Not Implanted      Joint Hip ACETABULAR CUP, SECTOR, GRIPTON, SIZE 54MM - [id number] Implanted      Screw SCREW CANCELLOUS 6.5 X 25 - [id number] Implanted      Joint Hip LINER, ALTRX, +4, 10 DEGREE, 36 X 54MM - [id number] Implanted      Joint STOP POSITIVE APEX HOLE ELIM - [id number] Implanted      Joint Hip HIP STEM, CORAIL AMT COLLAR SIZE 12 -  [id number] Implanted      Joint Hip FEMORAL HEAD, CERAMIC 36 +5 - [id number] Implanted               Findings: End-stage degenerative joint disease right hip    Indications: Kit Franklin is an 78 y.o. male who is having surgery for Primary osteoarthritis of right hip [M16.11].  Patient had severe right hip pain and limitations of activities of daily living secondary to right hip arthritis.  He had tried and failed conservative management.  We then discussed surgical treatment options including a right total hip replacement.  I discussed with the patient the risk, benefits alternatives of this procedure in detail.  I explained to him that the risks of the procedure include but are not limited to infection, iatrogenic fracture, damage to nerves and blood vessels, postoperative pain and stiffness, hardware failure, postoperative dislocation, postoperative DVT and pulmonary emboli, as well as risks associate with anesthesia.  The patient voiced understanding and informed consent was obtained.    The patient was seen in the preoperative area. The risks, benefits, complications, treatment options, non-operative alternatives, expected recovery and outcomes were discussed with the patient. The possibilities of reaction to medication, pulmonary aspiration, injury to surrounding structures, bleeding, recurrent infection, the need for additional procedures, failure to diagnose a condition, and creating a complication requiring transfusion or operation were discussed with the patient. The patient concurred with the proposed plan, giving informed consent.  The site of surgery was properly noted/marked if necessary per policy. The patient has been actively warmed in preoperative area. Preoperative antibiotics have been ordered and given within 1 hours of incision. Venous thrombosis prophylaxis have been ordered including unilateral sequential compression device    Procedure Details: The patient was prepped identified in the  preoperative waiting area and his right hip was marked as site of surgery.  Ancef was administered intravenously.  Patient was taken back to the operating room suite placed supine on the OR table.  After general anesthesia with endotracheal ovation was administered patient was placed in the left lateral decubitus position with the right hip elevated.  Patient's right lower extremity was prepped and draped you sterile fashion.  A preoperative verification timeout was taken.  At this point approximately 9 inch curvilinear incision was made over the proximal aspect of the right femur.  Sharp dissection was carried through skin and subcutaneous tissue.  Electrocautery was used to achieve hemostasis.  The IT band was opened up.  The hip was external rotated and the short external rotators were tenotomized from the greater trochanter and tagged with Ethibond suture.  Hip was then dislocated.  Osteotomy was made through the femoral neck with a sagittal saw leaving about 14 mm of bone.  At this point retractors were placed at the 3 and 6:00 positions around the acetabulum.  Soft tissue surrounding acetone was removed.  I began reaming with a size 45 mm reamer.  I increased to I had good bony bleeding with a 53 mm diameter reamer.  At this point I placed in my trial 54 mm cup.  I used a single radiographic image to confirm anteversion and inclination.  I was satisfied with this.  The trial cup was removed.  I then impacted in a DePuy [place] size 54 mm diameter acetabular cup with 3 holes.  I placed a single 25 mm screw superiorly.  At this point I placed in a trial liner with a 10 degree lipped posteriorly.    I then turned my attention to the proximal femur.  I used a box cutting osteotome followed by the canal finding reamer followed by the lateralizing reamer.  I began broaching with a size 8 broach.  I continued into I had best fit with a size 12 broach.  At this point I used a calcar reamer.  I then began trialing  with a standard offset neck.  I first trialed with a 36 mm diameter head with a +1.5 mm neck length.  However I had better fit using a +5 mm length length.  With the hip located with these trial components in place I did use a single radiographic image to again confirm alignment of the trial prosthesis and final acetabular liner.  I satisfied with this.  With the trial components and I was able to flex the hip to 90 degrees and internally rotate to greater than flex the hip to 90 degrees and internally rotate to greater than 90 degrees without dislocation.  Also satisfied with my limb length equality.  Hip was dislocated.  I removed the trial femoral components as well as the trial acetabular liner.  Wound was copiously irrigated with normal saline.  I impacted on a [place] Datauy acetabular liner with a 10 degree lipped.  I then impacted in the DePuy size 12 collared corail femoral stem with standard offset neck.  I then impacted on my DePuy ceramic 36 mm diameter femoral head with +5 mm neck length.  Hip was relocated.  I satisfied with my limb lengths and stability.  I was satisfied with my stability with the leg in extension and actually rotated as well as with the hip flexed to 90 degrees and internally rotated greater than 80 degrees.  Wound was copiously irrigated with normal saline.  The short external rotators were repaired back to the greater trochanter.  IT band was repaired with #1 Vicryl.  Subcutaneous tissue was closed with 2-0 Vicryl.  Skin was closed with a running 3-0 Monocryl suture.  Sterile Mepilex dressing was applied.  Patient was returned to the supine position with an abduction pillow.  Patient was awakened from anesthesia and returned to recovery room in stable condition.  There were no complications during the case.  Estimated blood loss was 500 mL.    The patient will be weightbearing as tolerated in his right lower extremity.  He will receive Ancef for antibiotic prophylaxis.  He will be on  aspirin 325 mg p.o. twice daily for DVT prophylaxis.  Complications:  None; patient tolerated the procedure well.    Disposition: PACU - hemodynamically stable.  Condition: stable         Additional Details: none    Attending Attestation: I performed the procedure.    Tyler Kat  Phone Number: [phone]

## 2024-06-04 NOTE — ANESTHESIA PROCEDURE NOTES
Airway  Date/Time: 6/4/2024 10:19 AM  Urgency: elective    Airway not difficult    Staffing  Performed: attending   Authorized by: LEEROY Cross    Performed by: LEEROY Cross  Patient location during procedure: OR    Indications and Patient Condition  Indications for airway management: anesthesia  Spontaneous Ventilation: absent  Sedation level: deep  Preoxygenated: yes  Patient position: sniffing  MILS maintained throughout  Mask difficulty assessment: 1 - vent by mask  Planned trial extubation    Final Airway Details  Final airway type: endotracheal airway      Successful airway: ETT  Cuffed: yes   Successful intubation technique: video laryngoscopy  Facilitating devices/methods: intubating stylet  Endotracheal tube insertion site: oral  Blade: Gagan  Blade size: #4  ETT size (mm): 7.5  Cormack-Lehane Classification: grade I - full view of glottis  Placement verified by: chest auscultation and capnometry   Cuff volume (mL): 10  Measured from: lips  Number of attempts at approach: 1  Number of other approaches attempted: 0    Additional Comments  Paramedic student

## 2024-06-04 NOTE — ANESTHESIA PREPROCEDURE EVALUATION
Patient: Kit Franklin    Procedure Information       Date/Time: 06/04/24 1045    Procedure: RIGHT TOTAL HIP REPLACEMENT (Right: Hip)    Location: [facility] OR 07 / [facility]    Surgeons: Tyler Kat MD            Relevant Problems   Cardiac   (+) Benign essential HTN   (+) Hyperlipemia   (+) RBBB   (+) Systolic murmur      Neuro   (+) Lumbar radiculopathy      GI   (+) Irritable bowel syndrome with diarrhea      Endocrine   (+) Obesity      Musculoskeletal   (+) Facet degeneration of lumbar region   (+) Osteoarthritis      HEENT   (+) Bilateral hearing loss      ID   (+) COVID-19       Clinical information reviewed:      Problems              NPO Detail:  No data recorded     Physical Exam    Airway  Mallampati: IV  TM distance: <3 FB  Neck ROM: full     Cardiovascular - normal exam  Rhythm: regular  Rate: normal     Dental - normal exam     Pulmonary - normal exam     Abdominal            Anesthesia Plan    History of general anesthesia?: yes  History of complications of general anesthesia?: no    ASA 3     general     The patient is not a current smoker.    intravenous induction   Postoperative administration of opioids is intended.  Trial extubation is planned.  Anesthetic plan and risks discussed with patient.  Use of blood products discussed with patient who consented to blood products.    Plan discussed with CAA.

## 2024-06-04 NOTE — BRIEF OP NOTE
Date: 2024  OR Location: [facility] OR    Name: Kit Franklin, : 1945, Age: 78 y.o., MRN: [id number], Sex: male    Diagnosis  Pre-op Diagnosis     * Primary osteoarthritis of right hip [M16.11] Post-op Diagnosis     * Primary osteoarthritis of right hip [M16.11]     Procedures  RIGHT TOTAL HIP REPLACEMENT  20616 - [place] ARTHRP ACETBLR/PROX FEM PROSTC AGRFT/ALGRFT      Surgeons      * Tyler Kat - Primary    Resident/Fellow/Other Assistant:  Surgeons and Role:  * No surgeons found with a matching role *    Procedure Summary  Anesthesia: General  ASA: III  Anesthesia Staff: Anesthesiologist: Jam Galaviz MD  C-AA: LEEROY Cross  Estimated Blood Loss: 500 mL  Intra-op Medications:   Administrations occurring from 1135 to 1430 on 24:   Medication Name Total Dose   ondansetron (Zofran) injection 4 mg 4 mg              Anesthesia Record               Intraprocedure I/O Totals          Intake    LR bolus 1000.00 mL    Total Intake 1000 mL       Output    Est. Blood Loss 500 mL    Total Output 500 mL       Net    Net Volume 500 mL          Specimen:   ID Type Source Tests Collected by Time   1 : RIGHT HIP BONE & TISSUE Tissue HIP ARTHROPLASTY RIGHT SURGICAL PATHOLOGY EXAM Tyler Kat MD 2024 3108        Staff:   Circulator: Bree Moreno Person: Anuja Dhillon Circulator: Radha          Findings: End-stage degenerative joint disease right hip    Complications:  None; patient tolerated the procedure well.     Disposition: PACU - hemodynamically stable.  Condition: stable  Specimens Collected:   ID Type Source Tests Collected by Time   1 : RIGHT HIP BONE & TISSUE Tissue HIP ARTHROPLASTY RIGHT SURGICAL PATHOLOGY EXAM Tyler Kat MD 2024 1059     Attending Attestation: I performed the procedure.    Tyler Kat  Phone Number: [phone]

## 2024-06-05 ENCOUNTER — PHARMACY VISIT (OUTPATIENT)
Dept: PHARMACY | Facility: CLINIC | Age: 79
End: 2024-06-05
Payer: COMMERCIAL

## 2024-06-05 ENCOUNTER — HOME HEALTH ADMISSION (OUTPATIENT)
Dept: HOME HEALTH SERVICES | Facility: HOME HEALTH | Age: 79
End: 2024-06-05
Payer: MEDICARE

## 2024-06-05 ENCOUNTER — DOCUMENTATION (OUTPATIENT)
Dept: HOME HEALTH SERVICES | Facility: HOME HEALTH | Age: 79
End: 2024-06-05
Payer: MEDICARE

## 2024-06-05 LAB
ERYTHROCYTE [DISTWIDTH] IN BLOOD BY AUTOMATED COUNT: 13.8 % (ref 11.5–14.5)
HCT VFR BLD AUTO: 29.4 % (ref 41–52)
HGB BLD-MCNC: 9.6 G/DL (ref 13.5–17.5)
HOLD SPECIMEN: NORMAL
MCH RBC QN AUTO: 32.7 PG (ref 26–34)
MCHC RBC AUTO-ENTMCNC: 32.7 G/DL (ref 32–36)
MCV RBC AUTO: 100 FL (ref 80–100)
NRBC BLD-RTO: 0 /100 WBCS (ref 0–0)
PLATELET # BLD AUTO: 143 X10*3/UL (ref 150–450)
RBC # BLD AUTO: 2.94 X10*6/UL (ref 4.5–5.9)
WBC # BLD AUTO: 11.7 X10*3/UL (ref 4.4–11.3)

## 2024-06-05 PROCEDURE — 85027 COMPLETE CBC AUTOMATED: CPT | Performed by: ORTHOPAEDIC SURGERY

## 2024-06-05 PROCEDURE — 36415 COLL VENOUS BLD VENIPUNCTURE: CPT | Performed by: ORTHOPAEDIC SURGERY

## 2024-06-05 PROCEDURE — 97535 SELF CARE MNGMENT TRAINING: CPT | Mod: CQ,GP

## 2024-06-05 PROCEDURE — 97116 GAIT TRAINING THERAPY: CPT | Mod: CQ,GP

## 2024-06-05 PROCEDURE — 2500000002 HC RX 250 W HCPCS SELF ADMINISTERED DRUGS (ALT 637 FOR MEDICARE OP, ALT 636 FOR OP/ED): Mod: MUE | Performed by: ORTHOPAEDIC SURGERY

## 2024-06-05 PROCEDURE — 97535 SELF CARE MNGMENT TRAINING: CPT | Mod: CO,GO

## 2024-06-05 PROCEDURE — 97165 OT EVAL LOW COMPLEX 30 MIN: CPT | Mod: GO

## 2024-06-05 PROCEDURE — RXMED WILLOW AMBULATORY MEDICATION CHARGE

## 2024-06-05 PROCEDURE — 2500000004 HC RX 250 GENERAL PHARMACY W/ HCPCS (ALT 636 FOR OP/ED): Performed by: ORTHOPAEDIC SURGERY

## 2024-06-05 PROCEDURE — 7100000011 HC EXTENDED STAY RECOVERY HOURLY - NURSING UNIT

## 2024-06-05 PROCEDURE — 99024 POSTOP FOLLOW-UP VISIT: CPT | Performed by: ORTHOPAEDIC SURGERY

## 2024-06-05 PROCEDURE — 97110 THERAPEUTIC EXERCISES: CPT | Mod: GP,CQ

## 2024-06-05 PROCEDURE — 2500000001 HC RX 250 WO HCPCS SELF ADMINISTERED DRUGS (ALT 637 FOR MEDICARE OP): Performed by: CLINICAL NURSE SPECIALIST

## 2024-06-05 PROCEDURE — 2500000004 HC RX 250 GENERAL PHARMACY W/ HCPCS (ALT 636 FOR OP/ED): Performed by: CLINICAL NURSE SPECIALIST

## 2024-06-05 PROCEDURE — 2500000001 HC RX 250 WO HCPCS SELF ADMINISTERED DRUGS (ALT 637 FOR MEDICARE OP): Performed by: ORTHOPAEDIC SURGERY

## 2024-06-05 RX ORDER — OXYCODONE HYDROCHLORIDE 5 MG/1
10 TABLET ORAL EVERY 6 HOURS PRN
Status: DISCONTINUED | OUTPATIENT
Start: 2024-06-05 | End: 2024-06-06 | Stop reason: HOSPADM

## 2024-06-05 RX ORDER — KETOROLAC TROMETHAMINE 30 MG/ML
15 INJECTION, SOLUTION INTRAMUSCULAR; INTRAVENOUS ONCE
Status: COMPLETED | OUTPATIENT
Start: 2024-06-05 | End: 2024-06-05

## 2024-06-05 RX ORDER — ACETAMINOPHEN 325 MG/1
650 TABLET ORAL EVERY 6 HOURS SCHEDULED
Start: 2024-06-05

## 2024-06-05 RX ORDER — TRAMADOL HYDROCHLORIDE 50 MG/1
50 TABLET ORAL EVERY 6 HOURS PRN
Qty: 28 TABLET | Refills: 0 | Status: SHIPPED | OUTPATIENT
Start: 2024-06-05 | End: 2024-06-12

## 2024-06-05 RX ORDER — TRAMADOL HYDROCHLORIDE 50 MG/1
50 TABLET ORAL EVERY 6 HOURS
Status: DISCONTINUED | OUTPATIENT
Start: 2024-06-05 | End: 2024-06-06 | Stop reason: HOSPADM

## 2024-06-05 RX ORDER — OXYCODONE HYDROCHLORIDE 5 MG/1
5-10 TABLET ORAL EVERY 6 HOURS PRN
Qty: 28 TABLET | Refills: 0 | Status: SHIPPED | OUTPATIENT
Start: 2024-06-05 | End: 2024-06-06

## 2024-06-05 RX ORDER — DOCUSATE SODIUM 100 MG/1
100 CAPSULE, LIQUID FILLED ORAL DAILY
Start: 2024-06-05 | End: 2024-06-10 | Stop reason: HOSPADM

## 2024-06-05 RX ORDER — ASPIRIN 325 MG
325 TABLET, DELAYED RELEASE (ENTERIC COATED) ORAL 2 TIMES DAILY
Qty: 60 TABLET | Refills: 0 | Status: SHIPPED | OUTPATIENT
Start: 2024-06-05 | End: 2024-07-05

## 2024-06-05 RX ORDER — OXYCODONE HYDROCHLORIDE 5 MG/1
5 TABLET ORAL EVERY 4 HOURS PRN
Status: DISCONTINUED | OUTPATIENT
Start: 2024-06-05 | End: 2024-06-06 | Stop reason: HOSPADM

## 2024-06-05 RX ADMIN — METOPROLOL SUCCINATE 50 MG: 50 TABLET, EXTENDED RELEASE ORAL at 09:06

## 2024-06-05 RX ADMIN — TAMSULOSIN HYDROCHLORIDE 0.4 MG: 0.4 CAPSULE ORAL at 09:07

## 2024-06-05 RX ADMIN — TRAMADOL HYDROCHLORIDE 50 MG: 50 TABLET, COATED ORAL at 21:53

## 2024-06-05 RX ADMIN — ACETAMINOPHEN 650 MG: 325 TABLET ORAL at 05:26

## 2024-06-05 RX ADMIN — CEFAZOLIN SODIUM 2 G: 2 INJECTION, SOLUTION INTRAVENOUS at 02:17

## 2024-06-05 RX ADMIN — DOCUSATE SODIUM 100 MG: 100 CAPSULE, LIQUID FILLED ORAL at 21:53

## 2024-06-05 RX ADMIN — MORPHINE SULFATE 2 MG: 2 INJECTION, SOLUTION INTRAMUSCULAR; INTRAVENOUS at 03:25

## 2024-06-05 RX ADMIN — OXYCODONE HYDROCHLORIDE AND ACETAMINOPHEN 1 TABLET: 5; 325 TABLET ORAL at 06:59

## 2024-06-05 RX ADMIN — ACETAMINOPHEN 650 MG: 325 TABLET ORAL at 12:08

## 2024-06-05 RX ADMIN — KETOROLAC TROMETHAMINE 15 MG: 30 INJECTION, SOLUTION INTRAMUSCULAR at 13:18

## 2024-06-05 RX ADMIN — ACETAMINOPHEN 650 MG: 325 TABLET ORAL at 18:47

## 2024-06-05 RX ADMIN — OXYCODONE HYDROCHLORIDE 10 MG: 5 TABLET ORAL at 12:08

## 2024-06-05 RX ADMIN — TRAMADOL HYDROCHLORIDE 50 MG: 50 TABLET, COATED ORAL at 15:11

## 2024-06-05 RX ADMIN — ASPIRIN 325 MG: 325 TABLET, COATED ORAL at 21:53

## 2024-06-05 RX ADMIN — ACETAMINOPHEN 650 MG: 325 TABLET ORAL at 00:11

## 2024-06-05 RX ADMIN — ASPIRIN 325 MG: 325 TABLET, COATED ORAL at 09:06

## 2024-06-05 RX ADMIN — CHOLESTYRAMINE LIGHT 4 G: 4 POWDER, FOR SUSPENSION ORAL at 21:53

## 2024-06-05 RX ADMIN — OXYCODONE HYDROCHLORIDE AND ACETAMINOPHEN 1 TABLET: 5; 325 TABLET ORAL at 01:18

## 2024-06-05 RX ADMIN — TRAMADOL HYDROCHLORIDE 50 MG: 50 TABLET, COATED ORAL at 09:06

## 2024-06-05 ASSESSMENT — PAIN DESCRIPTION - LOCATION: LOCATION: HIP

## 2024-06-05 ASSESSMENT — COGNITIVE AND FUNCTIONAL STATUS - GENERAL
STANDING UP FROM CHAIR USING ARMS: A LITTLE
DRESSING REGULAR LOWER BODY CLOTHING: A LOT
MOBILITY SCORE: 17
HELP NEEDED FOR BATHING: A LOT
PERSONAL GROOMING: A LITTLE
MOVING TO AND FROM BED TO CHAIR: A LOT
MOVING FROM LYING ON BACK TO SITTING ON SIDE OF FLAT BED WITH BEDRAILS: A LOT
MOVING TO AND FROM BED TO CHAIR: A LITTLE
TURNING FROM BACK TO SIDE WHILE IN FLAT BAD: A LITTLE
WALKING IN HOSPITAL ROOM: A LOT
CLIMB 3 TO 5 STEPS WITH RAILING: A LOT
STANDING UP FROM CHAIR USING ARMS: A LOT
HELP NEEDED FOR BATHING: A LOT
MOVING TO AND FROM BED TO CHAIR: A LOT
WALKING IN HOSPITAL ROOM: A LOT
TOILETING: A LOT
WALKING IN HOSPITAL ROOM: A LITTLE
MOBILITY SCORE: 11
STANDING UP FROM CHAIR USING ARMS: A LOT
DAILY ACTIVITIY SCORE: 18
MOVING FROM LYING ON BACK TO SITTING ON SIDE OF FLAT BED WITH BEDRAILS: A LOT
TURNING FROM BACK TO SIDE WHILE IN FLAT BAD: A LOT
DRESSING REGULAR LOWER BODY CLOTHING: A LOT
TURNING FROM BACK TO SIDE WHILE IN FLAT BAD: A LOT
CLIMB 3 TO 5 STEPS WITH RAILING: TOTAL
TOILETING: A LOT
MOBILITY SCORE: 11
CLIMB 3 TO 5 STEPS WITH RAILING: TOTAL
MOVING FROM LYING ON BACK TO SITTING ON SIDE OF FLAT BED WITH BEDRAILS: A LITTLE
DAILY ACTIVITIY SCORE: 17

## 2024-06-05 ASSESSMENT — ACTIVITIES OF DAILY LIVING (ADL)
BATHING_ASSISTANCE: MODERATE
ADL_ASSISTANCE: INDEPENDENT
HOME_MANAGEMENT_TIME_ENTRY: 54
BATHING_LEVEL_OF_ASSISTANCE: MAXIMUM ASSISTANCE

## 2024-06-05 ASSESSMENT — PAIN SCALES - GENERAL
PAINLEVEL_OUTOF10: 7
PAINLEVEL_OUTOF10: 3
PAINLEVEL_OUTOF10: 6
PAINLEVEL_OUTOF10: 4
PAINLEVEL_OUTOF10: 9
PAINLEVEL_OUTOF10: 6

## 2024-06-05 ASSESSMENT — PAIN - FUNCTIONAL ASSESSMENT
PAIN_FUNCTIONAL_ASSESSMENT: 0-10

## 2024-06-05 NOTE — PROGRESS NOTES
Physical Therapy    Physical Therapy Treatment    Patient Name: Kit Franklin  MRN: [id number]  Today's Date: 6/5/2024  Time Calculation  Start Time: 1001  Stop Time: 1101  Time Calculation (min): 60 min    Assessment/Plan         PT Plan  Treatment/Interventions: Bed mobility, Transfer training, Gait training, Stair training, Balance training, Therapeutic exercise, Therapeutic activity, Home exercise program  PT Plan: Skilled PT  PT Frequency: BID  PT Discharge Recommendations: Low intensity level of continued care (w/ 24 hr support for safe transition home postop)  PT Recommended Transfer Status: Assist x2  PT - OK to Discharge: Yes (to next level of care when cleared by medical team)      General Visit Information:   PT  Visit  PT Received On: 06/05/24       Subjective               Objective   Pain:8/10 r hip                          Treatments:       Bed Mobility  Bed Mobility:  (supine to sit and sit to supine max a x 1)    Ambulation/Gait Training  Ambulation/Gait Training Performed:  (ambulated 10 feet x 2 using fixed wheeled walker mod a x 1-2 and chair follow   rle abducted b knees flexed  decreased stride length   and decreased sequencing)  Transfers  Transfer:  (sit to stand mod a x 1-2)         Outcome Measures:  [facility] Basic Mobility  Turning from your back to your side while in a flat bed without using bedrails: A lot  Moving from lying on your back to sitting on the side of a flat bed without using bedrails: A lot  Moving to and from bed to chair (including a wheelchair): A lot  Standing up from a chair using your arms (e.g. wheelchair or bedside chair): A lot  To walk in hospital room: A lot  Climbing 3-5 steps with railing: Total  Basic Mobility - Total Score: 11    Education Documentation  Body Mechanics, taught by Xiomara Jackson PTA at 6/5/2024  3:08 PM.  Learner: Patient  Readiness: Acceptance  Method: Demonstration  Response: Demonstrated Understanding    Precautions, taught by Xiomara Jackson  PTA at 6/5/2024  3:08 PM.  Learner: Patient  Readiness: Acceptance  Method: Demonstration  Response: Demonstrated Understanding    ADL Training, taught by Xiomara Jackson PTA at 6/5/2024  3:08 PM.  Learner: Patient  Readiness: Acceptance  Method: Demonstration  Response: Demonstrated Understanding    Mobility Training, taught by Xiomara Jackson, KASHMIR at 6/5/2024  3:08 PM.  Learner: Patient  Readiness: Acceptance  Method: Demonstration  Response: Demonstrated Understanding    Education Comments  No comments found.               Encounter Problems       Encounter Problems (Active)       PT Problem       STG - Pt will transition supine <> sitting with min assist x1 while maintaining thr precautions (Progressing)       Start:  06/04/24    Expected End:  06/06/24            STG - Pt will transfer STS with sba  (Progressing)       Start:  06/04/24    Expected End:  06/06/24            STG - Pt will amb >=50' using ww with sba  (Progressing)       Start:  06/04/24    Expected End:  06/06/24            STG -  Pt will navigate >=4 stairs simulating home environment w/ step to step le pattern w/ cga (Progressing)       Start:  06/04/24    Expected End:  06/06/24            STG - Pt will perform a B LE postop thr program of 2-3 sets of 10 to facilitate increased functional mobility & gait stability (Progressing)       Start:  06/04/24    Expected End:  06/06/24               Pain - Adult

## 2024-06-05 NOTE — PROGRESS NOTES
Occupational Therapy    Evaluation    Patient Name: Kit Franklin  MRN: [id number]  Today's Date: 6/5/2024  Time Calculation  Start Time: 0821  Stop Time: 0836  Time Calculation (min): 15 min  216/216-A    Assessment  IP OT Assessment  Evaluation/Treatment Tolerance: Patient limited by pain  Medical Staff Made Aware: Yes  End of Session Communication: Bedside nurse  End of Session Patient Position: Up in chair, Alarm off, not on at start of session    Plan:  Treatment Interventions: ADL retraining, Functional transfer training, Endurance training, Patient/family training, Equipment evaluation/education  OT Frequency: Daily  OT Discharge Recommendations: Low intensity level of continued care  OT - OK to Discharge: Yes (once medically appropriate)    Subjective     Current Problem:  1. Primary osteoarthritis of right hip  Surgical Pathology Exam    Surgical Pathology Exam    acetaminophen (Tylenol) 325 mg tablet    aspirin 325 mg EC tablet    docusate sodium (Colace) 100 mg capsule    Referral to Home Health    oxyCODONE (Roxicodone) 5 mg immediate release tablet    traMADol (Ultram) 50 mg tablet          General:  General  Reason for Referral: OT eval and treat  Referred By: [place]  Prior to Session Communication: Bedside nurse  Patient Position Received: Up in chair, Alarm off, not on at start of session  General Comment: 79 y/o M POD #1 elective R THR posterior approach    Precautions:  LE Weight Bearing Status: Weight Bearing as Tolerated (RLE)  Medical Precautions: Fall precautions  Post-Surgical Precautions: Right hip precautions (posterior)  Precautions Comment: [place]    Vital Signs:       Pain:  Pain Assessment  Pain Assessment: 0-10  Pain Score: 6  Pain Type: Surgical pain  Pain Location: Hip    Objective     Cognition:  Overall Cognitive Status: Within Functional Limits  Orientation Level: Oriented X4             Home Living:  Type of Home: House  Lives With: Spouse  Home Adaptive Equipment: Walker rolling or  standard  Home Layout:  (split level)  Home Access: Stairs to enter with rails  Entrance Stairs-Number of Steps: 12  Bathroom Shower/Tub: Tub/shower unit  Bathroom Toilet: Standard  Bathroom Equipment: Grab bars in shower, Shower chair with back, Hand-held shower hose, Raised toilet seat with rails  Home Living Comments: pt owns standard bed, however sleeps in zero gravity recliner d/t back issues     Prior Function:  Level of [place]: Independent with ADLs and functional transfers, Independent with homemaking with ambulation  ADL Assistance: Independent  Homemaking Assistance: Independent  Ambulatory Assistance: Independent (recently using cane prn)  Prior Function Comments: Pt reports using reacher, LB dressing effortful. Drives. No falls.      ADL:  Eating Assistance: Independent  Grooming Assistance: Independent  Grooming Deficit: Setup  Bathing Assistance: Moderate  UE Dressing Assistance: Independent  LE Dressing Assistance: Maximal  Toileting Assistance with Device: Moderate    Bed Mobility/Transfers:      Transfers  Transfer: Yes  Transfer 1  Technique 1: Sit to stand, Stand to sit  Transfer Device 1: Walker  Transfer Level of Assistance 1: Moderate assistance, Moderate verbal cues  Trials/Comments 1: mod A x 1 for STS from chair level using FWW, cues for hand placement and technique, support for lifting, steadying and safety    Ambulation/Gait Training:  Functional Mobility  Functional Mobility Performed: Yes  Functional Mobility 1  Device 1: Rolling walker  Assistance 1: Moderate assistance, Moderate verbal cues  Comments 1: pt completes simple mobility 4 steps forward/backward using FWW and mod A with mod cues for sequencing, technique and safety    Sitting Balance:  Static Sitting Balance  Static Sitting-Comment/Number of Minutes: fair    Standing Balance:  Static Standing Balance  Static Standing-Comment/Number of Minutes: fair-    Sensation:  Sensation Comment: Pt c/o numbness in R  foot    Strength:  Strength Comments: bilat UE WFL         Extremities: RUE   RUE : Within Functional Limits and LUE   LUE: Within Functional Limits    Outcome Measures: [facility] Daily Activity  Putting on and taking off regular lower body clothing: A lot  Bathing (including washing, rinsing, drying): A lot  Putting on and taking off regular upper body clothing: None  Toileting, which includes using toilet, bedpan or urinal: A lot  Taking care of personal grooming such as brushing teeth: None  Eating Meals: None  Daily Activity - Total Score: 18                    EDUCATION:     Education Documentation  Body Mechanics, taught by Agnes Grijalva OT at 6/5/2024  9:24 AM.  Learner: Patient  Readiness: Acceptance  Method: Explanation  Response: Verbalizes Understanding, Needs Reinforcement    Precautions, taught by Agnes Grijalva OT at 6/5/2024  9:24 AM.  Learner: Patient  Readiness: Acceptance  Method: Explanation  Response: Verbalizes Understanding, Needs Reinforcement    ADL Training, taught by Agnes Grijalva OT at 6/5/2024  9:24 AM.  Learner: Patient  Readiness: Acceptance  Method: Explanation  Response: Verbalizes Understanding, Needs Reinforcement    Education Comments  No comments found.        Goals:   Encounter Problems       Encounter Problems (Active)       OT Goals       Mod I for all functional transfers (Progressing)       Start:  06/05/24    Expected End:  06/07/24            Mod I for simulated HH distances (Progressing)       Start:  06/05/24    Expected End:  06/07/24            Pt will complete upper and lower body bathing/dressing; toileting with modified independence using adaptive equipment as needed    (Progressing)       Start:  06/05/24    Expected End:  06/07/24

## 2024-06-05 NOTE — PROGRESS NOTES
Physical Therapy    Physical Therapy Treatment    Patient Name: Kit Franklin  MRN: [id number]  Today's Date: 6/5/2024  Time Calculation  Start Time: 1301  Stop Time: 1401  Time Calculation (min): 60 min    Assessment/Plan         PT Plan  Treatment/Interventions: Bed mobility, Transfer training, Gait training, Stair training, Balance training, Therapeutic exercise, Therapeutic activity, Home exercise program  PT Plan: Skilled PT  PT Frequency: BID  PT Discharge Recommendations: Low intensity level of continued care (w/ 24 hr support for safe transition home postop)  PT Recommended Transfer Status: Assist x2  PT - OK to Discharge: Yes (to next level of care when cleared by medical team)      General Visit Information:   PT  Visit  PT Received On: 06/05/24  General  Family/Caregiver Present:  (wife observed)    Subjective             Objective   Pain:5/10 r hip pain-was given torodol                          Treatments:  Therapeutic Exercise  Therapeutic Exercise Performed:  (supine rle ap's,qs, gs,hs,saqs,laqs x 20 reps ea cold pack to right hip post rx)    Bed Mobility  Bed Mobility:  (supine to sit and sit to supine max a x 1)    Ambulation/Gait Training  Ambulation/Gait Training Performed:  (ambulated 5 feet using fixed wheeled walker mod a x 2  and chair follow  decreased stride length  unsteady b knees flexed   rl abducted)  Transfers  Transfer:  (sit to stand mod a x 1-2)         Outcome Measures:  [facility] Basic Mobility  Turning from your back to your side while in a flat bed without using bedrails: A lot  Moving from lying on your back to sitting on the side of a flat bed without using bedrails: A lot  Moving to and from bed to chair (including a wheelchair): A lot  Standing up from a chair using your arms (e.g. wheelchair or bedside chair): A lot  To walk in hospital room: A lot  Climbing 3-5 steps with railing: Total  Basic Mobility - Total Score: 11    Education Documentation  Body Mechanics, taught by Xiomara  SHERIF Jackson, KASHMIR at 6/5/2024  3:16 PM.  Learner: Patient  Readiness: Acceptance  Method: Demonstration  Response: Demonstrated Understanding    Precautions, taught by Xiomara Jackson PTA at 6/5/2024  3:16 PM.  Learner: Patient  Readiness: Acceptance  Method: Demonstration  Response: Demonstrated Understanding    ADL Training, taught by Xiomara Jackson PTA at 6/5/2024  3:16 PM.  Learner: Patient  Readiness: Acceptance  Method: Demonstration  Response: Demonstrated Understanding    Mobility Training, taught by Xiomara Jackson PTA at 6/5/2024  3:16 PM.  Learner: Patient  Readiness: Acceptance  Method: Demonstration  Response: Demonstrated Understanding    Body Mechanics, taught by Xiomara Jackson PTA at 6/5/2024  3:08 PM.  Learner: Patient  Readiness: Acceptance  Method: Demonstration  Response: Demonstrated Understanding    Precautions, taught by Xiomara Jackson PTA at 6/5/2024  3:08 PM.  Learner: Patient  Readiness: Acceptance  Method: Demonstration  Response: Demonstrated Understanding    ADL Training, taught by Xiomara Jackson PTA at 6/5/2024  3:08 PM.  Learner: Patient  Readiness: Acceptance  Method: Demonstration  Response: Demonstrated Understanding    Mobility Training, taught by Xiomara Jackson PTA at 6/5/2024  3:08 PM.  Learner: Patient  Readiness: Acceptance  Method: Demonstration  Response: Demonstrated Understanding    Education Comments  No comments found.        OP EDUCATION:       Encounter Problems       Encounter Problems (Active)       PT Problem       STG - Pt will transition supine <> sitting with min assist x1 while maintaining thr precautions (Progressing)       Start:  06/04/24    Expected End:  06/06/24            STG - Pt will transfer STS with sba  (Progressing)       Start:  06/04/24    Expected End:  06/06/24            STG - Pt will amb >=50' using ww with sba  (Progressing)       Start:  06/04/24    Expected End:  06/06/24            STG -  Pt will navigate >=4 stairs simulating home  environment w/ step to step le pattern w/ cga (Progressing)       Start:  06/04/24    Expected End:  06/06/24            STG - Pt will perform a B LE postop thr program of 2-3 sets of 10 to facilitate increased functional mobility & gait stability (Progressing)       Start:  06/04/24    Expected End:  06/06/24               Pain - Adult

## 2024-06-05 NOTE — HH CARE COORDINATION
Home Care received a Referral for Nursing, Physical Therapy, and Occupational Therapy. We have processed the referral for a Start of Care on 6.6.     If you have any questions or concerns, please feel free to contact us at [phone]. Follow the prompts, enter your five digit zip code, and you will be directed to your care team on WEST 3.

## 2024-06-05 NOTE — PROGRESS NOTES
"Kit Franklin is a 78 y.o. male on day 0 of admission presenting with Osteoarthritis.    Subjective   Patient is postoperative day 1 following his right total hip replacement.  He has mild to moderate pain at the surgical site.  He appears to be complaining more of pain that is more radiculopathy from his low back pathology.  He did have difficulty getting up with therapy and nursing help overnight.  The patient denies numbness and paresthesias.  The patient denies chest pain or shortness of breath.       Objective     Physical Exam  Patient is sitting up at bedside in no acute distress.  Patient right hip bandages clean and dry.  His thigh compartments are soft.  He has appropriate strength with knee extension as well as plantarflexion and dorsiflexion of his right foot and ankle.  He has no right calf tenderness.    Last Recorded Vitals  Blood pressure 125/76, pulse 80, temperature 36.4 °C (97.5 °F), resp. rate 18, height 1.727 m (5' 7.99\"), weight 103 kg (227 lb 4.7 oz), SpO2 93%.  Intake/Output last 3 Shifts:  I/O last 3 completed shifts:  In: 3238.3 (31.4 mL/kg) [P.O.:240; I.V.:798.3 (7.7 mL/kg); IV Piggyback:2200]  Out: 1100 (10.7 mL/kg) [Urine:600 (0.2 mL/kg/hr); Blood:500]  Weight: 103.1 kg     Relevant Results      Scheduled medications  acetaminophen, 650 mg, oral, q6h RENA  aspirin, 325 mg, oral, BID  cholestyramine light, 4 g, oral, BID  docusate sodium, 100 mg, oral, BID  metoprolol succinate XL, 50 mg, oral, Daily  simvastatin, 20 mg, oral, Daily  tamsulosin, 0.4 mg, oral, Daily      Continuous medications  lactated Ringer's, 100 mL/hr, Last Rate: 100 mL/hr (06/04/24 2155)  oxygen, 2 L/min      PRN medications  PRN medications: diphenhydrAMINE, morphine, naloxone, ondansetron **OR** ondansetron, oxyCODONE-acetaminophen, oxyCODONE-acetaminophen  No results found for this or any previous visit (from the past 24 hour(s)).                         Assessment/Plan   Principal Problem:    " Osteoarthritis  Active Problems:    Primary osteoarthritis of right hip    Patient is currently doing satisfactory following his right total hip replacement.  The patient will be mobilized with physical therapy.  The goal would be for the patient to be discharged home later today.  He would then follow-up with orthopedic surgery in 1 month.       I spent 15 minutes in the professional and overall care of this patient.      Tyler Kat MD

## 2024-06-05 NOTE — PROGRESS NOTES
Occupational Therapy    OT Treatment    Patient Name: Kit Franklin  MRN: [id number]  Today's Date: 6/5/2024  Time Calculation  Start Time: 0921  Stop Time: 1015  Time Calculation (min): 54 min         Assessment:  End of Session Communication: Bedside nurse  End of Session Patient Position: Up in chair, Alarm off, not on at start of session     Plan:  Treatment Interventions: ADL retraining, Functional transfer training, Endurance training, Patient/family training, Equipment evaluation/education  OT Frequency: BID  OT Discharge Recommendations: Low intensity level of continued care  OT - OK to Discharge: Yes (once medically appropriate)  Treatment Interventions: ADL retraining, Functional transfer training, Endurance training, Patient/family training, Equipment evaluation/education    Subjective   Previous Visit Info:  OT Last Visit  OT Received On: 06/05/24  General:  General  Prior to Session Communication: Bedside nurse  Patient Position Received: Up in chair, Alarm off, not on at start of session  General Comment: min encouragement to complete therapy session, limited by pain and confused at times  Precautions:  LE Weight Bearing Status: Weight Bearing as Tolerated  Medical Precautions: Fall precautions  Post-Surgical Precautions: Right hip precautions       Pain:  Pain Assessment  Pain Assessment:  (7-8/10 in R hip, recently medicated by nurse and ice pack applied following tx session)    Objective         Activities of Daily Living: LE Bathing  LE Bathing Level of Assistance: Maximum assistance    LE Dressing  LE Dressing: Yes  LE Dressing Adaptive Equipment: Reacher, Sock aide  Pants Level of Assistance: Maximum assistance  Sock Level of Assistance: Maximum assistance  Compression Hose Level of Assistance: Maximum assistance. Pt educated on use of compression stockings, importance of compression stockings, donning/doffing, and wear schedule.   Adult Briefs Level of Assistance: Maximum assistance  LE Dressing  Where Assessed: Chair  LE Dressing Comments: pt educated on use of AE but getting frustrated and upset d/t difficulty following through with instruction. pt stated one could have assistance from spouse and son at home.  Pt educated on THR precautions as applied to self care tasks and transfers throughout tx session. Pt verbalized and demonstrated understanding.      Toileting  Toileting Level of Assistance: Moderate assistance  Where Assessed: Toilet  Functional Standing Tolerance:  Time: 3:00 standing at FWW to complete grooming  Bed Mobility/Transfers: Transfers  Transfer: Yes  Transfer 1  Technique 1: Sit to stand, Stand to sit  Transfer Device 1: Walker  Trials/Comments 1: pt required CGA to Min A for STS from chair with arms with max cues for sequencing and hand placement    Toilet Transfers  Toilet Transfer From: Rolling walker  Toilet Transfer Type: To and from  Toilet Transfer to: Raised toilet seat with rails  Toilet Transfer Technique: Ambulating  Toilet Transfers: Minimal assistance  Car Transfers  Car Transfers Comments: .Pt educated on transferring in/out of car adhering to precautions. Following demonstration from this therapist pt verbalized understanding.        Functional Mobility:  Functional Mobility  Functional Mobility Performed: Yes  Functional Mobility 1  Device 1: Rolling walker  Assistance 1:  (Min-Mod A)  Comments 1: pt ambulated in/out of bathroom with max cues for sequencing. pt unable to carryover instructions despite max cues. pt ambulated into BR with Min A but required Mod A to ambulate back from BR. pt unable to safely make it back to chiar requiring chair to be brought up to safely sit.    Outcome Measures:[facility] Daily Activity  Putting on and taking off regular lower body clothing: A lot  Bathing (including washing, rinsing, drying): A lot  Putting on and taking off regular upper body clothing: None  Toileting, which includes using toilet, bedpan or urinal: A lot  Taking care of  personal grooming such as brushing teeth: A little  Eating Meals: None  Daily Activity - Total Score: 17        Education Documentation  Body Mechanics, taught by KATHY Stratton at 6/5/2024  1:40 PM.  Learner: Patient  Readiness: Acceptance  Method: Explanation  Response: Verbalizes Understanding, Needs Reinforcement    Precautions, taught by KATHY Stratton at 6/5/2024  1:40 PM.  Learner: Patient  Readiness: Acceptance  Method: Explanation  Response: Verbalizes Understanding, Needs Reinforcement    ADL Training, taught by KATHY Stratton at 6/5/2024  1:40 PM.  Learner: Patient  Readiness: Acceptance  Method: Explanation  Response: Verbalizes Understanding, Needs Reinforcement    Education Comments  No comments found.             Goals:  Encounter Problems       Encounter Problems (Active)       OT Goals       Mod I for all functional transfers (Progressing)       Start:  06/05/24    Expected End:  06/06/24            Mod I for simulated HH distances (Progressing)       Start:  06/05/24    Expected End:  06/06/24            Pt will complete upper and lower body bathing/dressing; toileting with modified independence using adaptive equipment as needed    (Progressing)       Start:  06/05/24    Expected End:  06/06/24

## 2024-06-05 NOTE — DISCHARGE SUMMARY
Discharge Diagnosis  Osteoarthritis  Obesity with BMI 34.57    Issues Requiring Follow-Up  Right total hip replacement.    Test Results Pending At Discharge  Pending Labs       Order Current Status    Extra Tubes In process    PST Top In process    Surgical Pathology Exam In process            Hospital Course   Patient is a 78-year-old male who had significant right hip pain and difficulty with activities of daily living secondary to right hip arthritis.  He elected to undergo a right total hip replacement on June 4, 2024.  The patient tolerated the procedure with any complications.  Postoperatively he still had right hip pain, but he was also having his therapy hindered by a concomitant low back pathology which was affecting his right lower extremity.  This was even present before surgery.  The patient was mobilized with physicalal and occupational therpay.   He will follow-up with orthopedic surgery in 1 month.  Due to patent's slow progress with therapy, skilled nursing facility placement was recommended.  On 6/6/24, arrangements were made for patient's continued rehabilitation at [facility].  He will continue with PT/OT with 100% WBS and standard posterior hip precautions.  Patient is also ordered to continue to take aspirin 325mg tiwice a day for 30 days.      Pertinent Physical Exam At Time of Discharge  Physical Exam  Patient is in no acute distress.  He is sitting up at bedside.  His right hip bandage is clean and dry.  He has appropriate strength with right knee extension as well as plantarflexion and dorsiflexion of his right foot and ankle.  He has no right calf tenderness.    Home Medications     Medication List      START taking these medications     acetaminophen 325 mg tablet; Commonly known as: Tylenol; Take 2 tablets   (650 mg) by mouth every 6 hours.   aspirin 325 mg EC tablet; Take 1 tablet (325 mg) by mouth 2 times a day.   docusate sodium 100 mg capsule; Commonly known as: Colace; Take 1    capsule (100 mg) by mouth once daily.   oxyCODONE 5 mg immediate release tablet; Commonly known as: Roxicodone;   Take 1-2 tablets (5-10 mg) by mouth every 6 hours if needed for severe   pain (7 - 10) for up to 7 days.   traMADol 50 mg tablet; Commonly known as: Ultram; Take 1 tablet (50 mg)   by mouth every 6 hours if needed for moderate pain (4 - 6) for up to 7   days.     CONTINUE taking these medications     metoprolol succinate XL 50 mg 24 hr tablet; Commonly known as:   Toprol-XL; Take 1 tablet (50 mg) by mouth once daily.   MULTIVITAMIN ORAL   Prevalite 4 gram packet; Generic drug: cholestyramine-aspartame; Take 1   packet (4 g) by mouth 2 times a day as needed (diarrhea).   simvastatin 20 mg tablet; Commonly known as: Zocor; Take 1 tablet (20   mg) by mouth once daily.   tamsulosin 0.4 mg 24 hr capsule; Commonly known as: Flomax     STOP taking these medications     chlorhexidine 0.12 % solution; Commonly known as: Peridex   cyclobenzaprine 10 mg tablet; Commonly known as: Flexeril   fish oil concentrate 120-180 mg capsule; Commonly known as: Omega-3   meloxicam 15 mg tablet; Commonly known as: Mobic       Outpatient Follow-Up  Future Appointments   Date Time Provider Department Center   6/18/2024  1:15 PM Frederic Galeana, PT [id number] [place]   7/3/2024 10:00 AM Tyler Kat MD [id number] [place]   7/16/2024  9:30 AM Elias Morley DO [id number] [place]       Tyler Kat MD

## 2024-06-05 NOTE — DISCHARGE INSTRUCTIONS
PAIN MANAGEMENT AT HOME:  To provide the best pain control over the next few days when pain will be at it's worst, we suggest you continue to take the following medications routinely and separately to provide the best pain management.  In addition, apply ice VERY FREQUENTLY to incision.   Also use some type of alternative intervention such as music therapy, relaxation techniques, or an type of diversion (such as watching television or talking to a friend) to help control pain.         ROUTINE MEDICATIONS:            TAKE TRAMADOL EVERY 6 HOURS               TAKE TYLENOL EVERY 8 HOURS     In between, take oxycodone as needed for breakthrough pain.  DO NOT TAKE OXYCODONE AND TRAMADOL AT SAME TIME!!!!    Also, do NOT take more than 4000mg of acetaminophen (tylenol) in 24 hours.        CONSTIPATION MANAGEMENT AT HOME:  Constipation is common after Joint Replacement surgery for several reasons.  A common side effect of all pain medication is constipation.  A decrease in your activity as well as change in your normal diet & appetite all contribute to the constipation.  At home, it is important to take a daily stool softener such as Colace, Milk of magnesium or senokot while you are taking pain medications to help manage the constipation.  In addition, if you do NOT have a bowel movement within 72 hours of discharge, add a laxative such as miralax.  Miralax normally takes 2 to 3 days to work so you will not receive immediate results.  It is also important to drink plenty of fluids, especially water.  Stool softeners & laxatives need water to work properly.  We also suggest you increase your fiber intake either with foods high in fiber or with some type of supplement such as benefiber or metamucil.    Foods that are high in fiber include:     Fruits such as pears, strawberries, avocado, apples, raspberries, bananas, kiwis   Vegetables such as carrots, beets, broccoli, brussel sprouts, spinach   Lentils and kidney beans    Split peas and chickpeas   Oats, almonds, gurmeet seeds, and sweet potatoes      ACTIVITY AT HOME:  You will need to continue with your therapy after discharge either with in home therapy or at an outpatient facility.  Most patients initially do in home therapy for about two weeks then transition to outpatient therapy.  You have freedom of choice in which in home therapy and outpatient facility you plan to use.  Most in home therapy sessions will begin within 24 to 48 hours after discharge.  After about two weeks of in home therapy, you will most likely to ready for outpatient therapy sessions.  Outpatient therapy is normally twice a week for weeks.  While you are at home it is important that you perform the exercises the therapist went over with you in the hospital 2 to 3 times a day.  After you complete your exercises, apply ice to your incision to help with swelling and pain.  You should also be getting up and walking around your house every hour with the use of your walker.  While at rest, perform, ankle pumps on each foot at least ten times.  This will help with the swelling as well as decrease your risk for blood clots.  ALWAYS USE YOUR WALKER WITH ANY TYPE OF ACTIVITY!!!!    WOUND CARE:  The bandage on your incision will stay in place for 7 days.  The bandage is waterproof so you may shower with the bandage in place.  No need to wrap or cover it.  Some drainage on your bandage is perfectly normal.  Home health care will assist you with bandage removal.  Once the bandage is removed, your incision can be left open to air if there is no drainage present.  If your incision is draining at the time of bandage removal, home health care will assist you with applying a new gauze bandage.  Gauze bandages are NOT waterproof.    Swelling in your operative extremity will peak about 72 hours after surgery and can last for weeks.  To help with the swelling make sure you are elevating your leg and apply ice frequently.  In  addition, you will receive compression stockings upon discharge from the hospital.  Make sure you are wearing these stockings on both your legs at home.  Generally we instruct you to wear during the day and remove at bedtime for the next 4 weeks.      *Apply ice to incision at least 5 times daily    *Wear bilateral isidro hose stockings to lower extremities for next 4 weeks    *Do NOT take any non steroidal anti-inflammatory medications such as motrin, aleve, ibuprofen, celebrex or meloxicam    *Take daily stool softener.  If you experience any diarrhea, stop medication    *If you need a refill on your pain medication, contact your surgeon's office Monday through Thursday with as least 24 hours notice    If you have any questions or concerns please contact the Joint  Jen Rosen at:  Office: [phone]   Cell:  [phone]  Email:  [email or web address]

## 2024-06-06 VITALS
RESPIRATION RATE: 18 BRPM | SYSTOLIC BLOOD PRESSURE: 112 MMHG | BODY MASS INDEX: 34.45 KG/M2 | HEART RATE: 90 BPM | TEMPERATURE: 96.3 F | WEIGHT: 227.29 LBS | OXYGEN SATURATION: 97 % | DIASTOLIC BLOOD PRESSURE: 61 MMHG | HEIGHT: 68 IN

## 2024-06-06 PROCEDURE — 97535 SELF CARE MNGMENT TRAINING: CPT | Mod: CO,GO

## 2024-06-06 PROCEDURE — 7100000011 HC EXTENDED STAY RECOVERY HOURLY - NURSING UNIT

## 2024-06-06 PROCEDURE — 2500000002 HC RX 250 W HCPCS SELF ADMINISTERED DRUGS (ALT 637 FOR MEDICARE OP, ALT 636 FOR OP/ED): Mod: MUE | Performed by: ORTHOPAEDIC SURGERY

## 2024-06-06 PROCEDURE — 2500000001 HC RX 250 WO HCPCS SELF ADMINISTERED DRUGS (ALT 637 FOR MEDICARE OP): Performed by: ORTHOPAEDIC SURGERY

## 2024-06-06 PROCEDURE — 2500000001 HC RX 250 WO HCPCS SELF ADMINISTERED DRUGS (ALT 637 FOR MEDICARE OP): Performed by: CLINICAL NURSE SPECIALIST

## 2024-06-06 RX ORDER — OXYCODONE HYDROCHLORIDE 5 MG/1
5-10 TABLET ORAL EVERY 6 HOURS PRN
Qty: 15 TABLET | Refills: 0 | Status: SHIPPED | OUTPATIENT
Start: 2024-06-06 | End: 2024-06-06

## 2024-06-06 RX ORDER — OXYCODONE HYDROCHLORIDE 5 MG/1
5-10 TABLET ORAL EVERY 6 HOURS PRN
Start: 2024-06-06

## 2024-06-06 RX ADMIN — TAMSULOSIN HYDROCHLORIDE 0.4 MG: 0.4 CAPSULE ORAL at 08:50

## 2024-06-06 RX ADMIN — ACETAMINOPHEN 650 MG: 325 TABLET ORAL at 00:19

## 2024-06-06 RX ADMIN — DOCUSATE SODIUM 100 MG: 100 CAPSULE, LIQUID FILLED ORAL at 08:49

## 2024-06-06 RX ADMIN — TRAMADOL HYDROCHLORIDE 50 MG: 50 TABLET, COATED ORAL at 03:22

## 2024-06-06 RX ADMIN — TRAMADOL HYDROCHLORIDE 50 MG: 50 TABLET, COATED ORAL at 08:49

## 2024-06-06 RX ADMIN — CHOLESTYRAMINE LIGHT 4 G: 4 POWDER, FOR SUSPENSION ORAL at 08:50

## 2024-06-06 RX ADMIN — OXYCODONE HYDROCHLORIDE 5 MG: 5 TABLET ORAL at 10:39

## 2024-06-06 RX ADMIN — ACETAMINOPHEN 650 MG: 325 TABLET ORAL at 06:23

## 2024-06-06 RX ADMIN — OXYCODONE HYDROCHLORIDE 10 MG: 5 TABLET ORAL at 04:36

## 2024-06-06 RX ADMIN — SIMVASTATIN 20 MG: 20 TABLET, FILM COATED ORAL at 08:49

## 2024-06-06 RX ADMIN — METOPROLOL SUCCINATE 50 MG: 50 TABLET, EXTENDED RELEASE ORAL at 08:49

## 2024-06-06 RX ADMIN — ASPIRIN 325 MG: 325 TABLET, COATED ORAL at 08:49

## 2024-06-06 ASSESSMENT — COGNITIVE AND FUNCTIONAL STATUS - GENERAL
CLIMB 3 TO 5 STEPS WITH RAILING: A LITTLE
HELP NEEDED FOR BATHING: A LITTLE
MOBILITY SCORE: 18
DRESSING REGULAR LOWER BODY CLOTHING: A LITTLE
DAILY ACTIVITIY SCORE: 19
STANDING UP FROM CHAIR USING ARMS: A LITTLE
PERSONAL GROOMING: A LITTLE
WALKING IN HOSPITAL ROOM: A LITTLE
TOILETING: A LITTLE
HELP NEEDED FOR BATHING: A LOT
MOVING FROM LYING ON BACK TO SITTING ON SIDE OF FLAT BED WITH BEDRAILS: A LITTLE
DRESSING REGULAR LOWER BODY CLOTHING: A LOT
DAILY ACTIVITIY SCORE: 16
TURNING FROM BACK TO SIDE WHILE IN FLAT BAD: A LITTLE
DRESSING REGULAR UPPER BODY CLOTHING: A LITTLE
PERSONAL GROOMING: A LITTLE
DRESSING REGULAR UPPER BODY CLOTHING: A LITTLE
TOILETING: A LOT
MOVING TO AND FROM BED TO CHAIR: A LITTLE

## 2024-06-06 ASSESSMENT — PAIN - FUNCTIONAL ASSESSMENT
PAIN_FUNCTIONAL_ASSESSMENT: 0-10

## 2024-06-06 ASSESSMENT — PAIN SCALES - GENERAL
PAINLEVEL_OUTOF10: 0 - NO PAIN
PAINLEVEL_OUTOF10: 4
PAINLEVEL_OUTOF10: 4
PAINLEVEL_OUTOF10: 5 - MODERATE PAIN
PAINLEVEL_OUTOF10: 7

## 2024-06-06 ASSESSMENT — ACTIVITIES OF DAILY LIVING (ADL): HOME_MANAGEMENT_TIME_ENTRY: 26

## 2024-06-06 NOTE — PROGRESS NOTES
Patient attended pre op educational TJR class. RAPT score 9.    Discussed discharge plan with patient & pt's wife.  Patient progressing slowly with therapy and SNF placement recommended.  Patient reluctantly agreeable and states his first choice for facilities is [facility].  Referral placed and facility available to accept patient on 6/6 pending insurance approval.  Requested insurance authorization be initiated by hospital team.      Insurance approval obtained and facility able to accept patient for admission today.  Transportation arranged via physician's ambulette for 10:30am  time.  Patient updated and is agreeable with discharge plan.

## 2024-06-06 NOTE — CARE PLAN
The patient's goals for the shift include      The clinical goals for the shift include pt to remain free from discomfort    Discharge goals appropriate for discharge to snf

## 2024-06-06 NOTE — CARE PLAN
The patient's goals for the shift include      The clinical goals for the shift include pain control      Problem: Pain - Adult  Goal: Verbalizes/displays adequate comfort level or baseline comfort level  Outcome: Progressing     Problem: Discharge Planning  Goal: Discharge to home or other facility with appropriate resources  Outcome: Progressing     Problem: Chronic Conditions and Co-morbidities  Goal: Patient's chronic conditions and co-morbidity symptoms are monitored and maintained or improved  Outcome: Progressing     Problem: Skin  Goal: Decreased wound size/increased tissue granulation at next dressing change  Outcome: Progressing     Problem: Pain  Goal: Takes deep breaths with improved pain control throughout the shift  Outcome: Progressing     Problem: Pain  Goal: Turns in bed with improved pain control throughout the shift  Outcome: Progressing

## 2024-06-06 NOTE — PROGRESS NOTES
"Kit Franklin is a 78 y.o. male on day 0 of admission presenting with Osteoarthritis.    Subjective   Patient c/o moderate amount of incisional pain        Objective     Physical Exam  Vitals reviewed.   Musculoskeletal:      Comments: Neurovascular status WNL RLE  Negative hebert's sign to RLE  Moderate amount of edema present to RLE   Neurological:      Mental Status: He is alert.         Last Recorded Vitals  Blood pressure 112/61, pulse 90, temperature 35.7 °C (96.3 °F), temperature source Temporal, resp. rate 18, height 1.727 m (5' 7.99\"), weight 103 kg (227 lb 4.7 oz), SpO2 97%.  Intake/Output last 3 Shifts:  I/O last 3 completed shifts:  In: 848.3 (8.2 mL/kg) [P.O.:480; I.V.:368.3 (3.6 mL/kg)]  Out: 1800 (17.5 mL/kg) [Urine:1800 (0.5 mL/kg/hr)]  Weight: 103.1 kg     Relevant Results      Scheduled medications  acetaminophen, 650 mg, oral, q6h RENA  aspirin, 325 mg, oral, BID  cholestyramine light, 4 g, oral, BID  docusate sodium, 100 mg, oral, BID  metoprolol succinate XL, 50 mg, oral, Daily  simvastatin, 20 mg, oral, Daily  tamsulosin, 0.4 mg, oral, Daily  traMADol, 50 mg, oral, q6h      Continuous medications  oxygen, 2 L/min      PRN medications  PRN medications: diphenhydrAMINE, morphine, naloxone, ondansetron **OR** ondansetron, oxyCODONE, oxyCODONE  No results found for this or any previous visit (from the past 24 hour(s)).                         Assessment/Plan   Principal Problem:    Osteoarthritis  Active Problems:    Primary osteoarthritis of right hip     Continue therapy PT/OT with 100% WBS and standard posterior hip precautions.  For discharge to SNF today for additional rehabilitation  Continue aspirin 325mg twice a day for DVT prophylaxis             Jen Rosen APRN-CNS      "

## 2024-06-06 NOTE — PROGRESS NOTES
Occupational Therapy    OT Treatment    Patient Name: Kit Franklin  MRN: [id number]  Today's Date: 6/6/2024  Time Calculation  Start Time: 0952  Stop Time: 1018  Time Calculation (min): 26 min         Assessment:        Plan:  Treatment Interventions: ADL retraining, Functional transfer training, Endurance training, Patient/family training, Equipment evaluation/education  OT Frequency: Daily  OT Discharge Recommendations: Low intensity level of continued care  OT - OK to Discharge: Yes (once medically appropriate)  Treatment Interventions: ADL retraining, Functional transfer training, Endurance training, Patient/family training, Equipment evaluation/education    Subjective   Previous Visit Info:  OT Last Visit  OT Received On: 06/06/24  General:  General  Prior to Session Communication: Bedside nurse  Patient Position Received: Up in chair, Alarm off, not on at start of session  General Comment: pleasant and cooperative  Precautions:  LE Weight Bearing Status: Weight Bearing as Tolerated  Medical Precautions: Fall precautions  Post-Surgical Precautions: Right hip precautions       Pain:  Pain Assessment  Pain Assessment:  (4-5/10 in R hip, ice pack applied following therapy session)    Objective         Activities of Daily Living: LE Dressing  LE Dressing: Yes  Sock Level of Assistance: Maximum assistance  Compression Hose Level of Assistance: Maximum assistance  LE Dressing Where Assessed: Chair  Pt educated on THR precautions as applied to self care tasks and transfers throughout tx session. Pt verbalized and demonstrated understanding.  Pt able to recall 2/3 hip precautions from previous tx session.   Bed Mobility/Transfers: Transfers  Transfer: Yes  Transfer 1  Technique 1: Sit to stand, Stand to sit  Transfer Device 1: Walker  Transfer Level of Assistance 1: Minimum assistance    Toilet Transfers  Toilet Transfer From: Rolling walker  Toilet Transfer Type: To and from  Toilet Transfer to: Standard bedside  commode  Toilet Transfer Technique: Stand pivot  Toilet Transfers: Minimal assistance  Toilet Transfers Comments: min vc for increased safety    Functional Mobility:  Functional Mobility  Functional Mobility Performed: Yes  Functional Mobility 1  Device 1: Rolling walker  Assistance 1: Minimum assistance  Comments 1: pt ambulated few steps from chair over to commode with min vc for sequencing and increased safety      Outcome Measures:[facility] Daily Activity  Putting on and taking off regular lower body clothing: A lot  Bathing (including washing, rinsing, drying): A lot  Putting on and taking off regular upper body clothing: A little  Toileting, which includes using toilet, bedpan or urinal: A lot  Taking care of personal grooming such as brushing teeth: A little  Eating Meals: None  Daily Activity - Total Score: 16        Education Documentation  Body Mechanics, taught by KATHY Stratton at 6/6/2024 12:50 PM.  Learner: Patient  Readiness: Acceptance  Method: Explanation  Response: Verbalizes Understanding, Needs Reinforcement    Precautions, taught by KATHY Stratton at 6/6/2024 12:50 PM.  Learner: Patient  Readiness: Acceptance  Method: Explanation  Response: Verbalizes Understanding, Needs Reinforcement    ADL Training, taught by KATHY Stratton at 6/6/2024 12:50 PM.  Learner: Patient  Readiness: Acceptance  Method: Explanation  Response: Verbalizes Understanding, Needs Reinforcement    Body Mechanics, taught by KATHY Stratton at 6/5/2024  1:40 PM.  Learner: Patient  Readiness: Acceptance  Method: Explanation  Response: Verbalizes Understanding, Needs Reinforcement    Precautions, taught by KATHY Stratton at 6/5/2024  1:40 PM.  Learner: Patient  Readiness: Acceptance  Method: Explanation  Response: Verbalizes Understanding, Needs Reinforcement    ADL Training, taught by KATHY Stratton at 6/5/2024  1:40 PM.  Learner: Patient  Readiness: Acceptance  Method:  Explanation  Response: Verbalizes Understanding, Needs Reinforcement    Education Comments  No comments found.               Goals:  Encounter Problems       Encounter Problems (Active)       OT Goals       Mod I for all functional transfers (Progressing)       Start:  06/05/24    Expected End:  06/07/24            Mod I for simulated HH distances (Progressing)       Start:  06/05/24    Expected End:  06/07/24            Pt will complete upper and lower body bathing/dressing; toileting with modified independence using adaptive equipment as needed    (Progressing)       Start:  06/05/24    Expected End:  06/07/24

## 2024-06-07 ENCOUNTER — HOSPITAL ENCOUNTER (INPATIENT)
Facility: HOSPITAL | Age: 79
LOS: 2 days | Discharge: NURSING, REHAB OR LONG-TERM CARE | DRG: 948 | End: 2024-06-10
Attending: EMERGENCY MEDICINE | Admitting: STUDENT IN AN ORGANIZED HEALTH CARE EDUCATION/TRAINING PROGRAM
Payer: MEDICARE

## 2024-06-07 ENCOUNTER — APPOINTMENT (OUTPATIENT)
Dept: RADIOLOGY | Facility: HOSPITAL | Age: 79
DRG: 948 | End: 2024-06-07
Payer: MEDICARE

## 2024-06-07 ENCOUNTER — HOME CARE VISIT (OUTPATIENT)
Dept: HOME HEALTH SERVICES | Facility: HOME HEALTH | Age: 79
End: 2024-06-07

## 2024-06-07 ENCOUNTER — APPOINTMENT (OUTPATIENT)
Dept: CARDIOLOGY | Facility: HOSPITAL | Age: 79
DRG: 948 | End: 2024-06-07
Payer: MEDICARE

## 2024-06-07 ENCOUNTER — LAB REQUISITION (OUTPATIENT)
Dept: LAB | Facility: HOSPITAL | Age: 79
End: 2024-06-07

## 2024-06-07 DIAGNOSIS — R53.1 WEAKNESS GENERALIZED: ICD-10-CM
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
## 2024-06-07 DIAGNOSIS — E03.9 HYPOTHYROIDISM, UNSPECIFIED: ICD-10-CM

## 2024-06-07 DIAGNOSIS — M79.89 LEG SWELLING: ICD-10-CM

## 2024-06-07 DIAGNOSIS — E55.9 VITAMIN D DEFICIENCY, UNSPECIFIED: ICD-10-CM

## 2024-06-07 DIAGNOSIS — R53.1 GENERAL WEAKNESS: ICD-10-CM

## 2024-06-07 DIAGNOSIS — A41.9 SEPSIS (MULTI): Primary | ICD-10-CM

## 2024-06-07 DIAGNOSIS — R00.0 TACHYCARDIA: ICD-10-CM

## 2024-06-07 DIAGNOSIS — R29.898 WEAKNESS OF SHOULDER: ICD-10-CM

## 2024-06-07 DIAGNOSIS — K58.0 IRRITABLE BOWEL SYNDROME WITH DIARRHEA: ICD-10-CM

## 2024-06-07 DIAGNOSIS — I10 ESSENTIAL (PRIMARY) HYPERTENSION: ICD-10-CM

## 2024-06-07 LAB
25(OH)D3 SERPL-MCNC: 26 NG/ML (ref 30–100)
ALBUMIN SERPL BCP-MCNC: 3.6 G/DL (ref 3.4–5)
ALBUMIN SERPL BCP-MCNC: 3.7 G/DL (ref 3.4–5)
ALP SERPL-CCNC: 62 U/L (ref 33–136)
ALP SERPL-CCNC: 67 U/L (ref 33–136)
ALT SERPL W P-5'-P-CCNC: 22 U/L (ref 10–52)
ALT SERPL W P-5'-P-CCNC: 24 U/L (ref 10–52)
ANION GAP SERPL CALC-SCNC: 11 MMOL/L (ref 10–20)
ANION GAP SERPL CALC-SCNC: 15 MMOL/L (ref 10–20)
APPEARANCE UR: CLEAR
AST SERPL W P-5'-P-CCNC: 58 U/L (ref 9–39)
AST SERPL W P-5'-P-CCNC: 62 U/L (ref 9–39)
BASOPHILS # BLD AUTO: 0.01 X10*3/UL (ref 0–0.1)
BASOPHILS NFR BLD AUTO: 0.1 %
BILIRUB SERPL-MCNC: 0.8 MG/DL (ref 0–1.2)
BILIRUB SERPL-MCNC: 0.9 MG/DL (ref 0–1.2)
BILIRUB UR STRIP.AUTO-MCNC: NEGATIVE MG/DL
BUN SERPL-MCNC: 14 MG/DL (ref 6–23)
BUN SERPL-MCNC: 15 MG/DL (ref 6–23)
CALCIUM SERPL-MCNC: 8.6 MG/DL (ref 8.6–10.3)
CALCIUM SERPL-MCNC: 9 MG/DL (ref 8.6–10.3)
CARDIAC TROPONIN I PNL SERPL HS: 16 NG/L (ref 0–20)
CHLORIDE SERPL-SCNC: 102 MMOL/L (ref 98–107)
CHLORIDE SERPL-SCNC: 104 MMOL/L (ref 98–107)
CO2 SERPL-SCNC: 24 MMOL/L (ref 21–32)
CO2 SERPL-SCNC: 26 MMOL/L (ref 21–32)
COLOR UR: ABNORMAL
CREAT SERPL-MCNC: 0.77 MG/DL (ref 0.5–1.3)
CREAT SERPL-MCNC: 0.77 MG/DL (ref 0.5–1.3)
EGFRCR SERPLBLD CKD-EPI 2021: >90 ML/MIN/1.73M*2
EGFRCR SERPLBLD CKD-EPI 2021: >90 ML/MIN/1.73M*2
EOSINOPHIL # BLD AUTO: 0.03 X10*3/UL (ref 0–0.4)
EOSINOPHIL NFR BLD AUTO: 0.3 %
ERYTHROCYTE [DISTWIDTH] IN BLOOD BY AUTOMATED COUNT: 13.6 % (ref 11.5–14.5)
ERYTHROCYTE [DISTWIDTH] IN BLOOD BY AUTOMATED COUNT: 13.7 % (ref 11.5–14.5)
GLUCOSE SERPL-MCNC: 104 MG/DL (ref 74–99)
GLUCOSE SERPL-MCNC: 120 MG/DL (ref 74–99)
GLUCOSE UR STRIP.AUTO-MCNC: NORMAL MG/DL
HCT VFR BLD AUTO: 25.4 % (ref 41–52)
HCT VFR BLD AUTO: 25.5 % (ref 41–52)
HGB BLD-MCNC: 8.4 G/DL (ref 13.5–17.5)
HGB BLD-MCNC: 8.4 G/DL (ref 13.5–17.5)
HYALINE CASTS #/AREA URNS AUTO: ABNORMAL /LPF
IMM GRANULOCYTES # BLD AUTO: 0.09 X10*3/UL (ref 0–0.5)
IMM GRANULOCYTES NFR BLD AUTO: 1 % (ref 0–0.9)
KETONES UR STRIP.AUTO-MCNC: ABNORMAL MG/DL
LACTATE SERPL-SCNC: 0.9 MMOL/L (ref 0.4–2)
LEUKOCYTE ESTERASE UR QL STRIP.AUTO: NEGATIVE
LYMPHOCYTES # BLD AUTO: 0.76 X10*3/UL (ref 0.8–3)
LYMPHOCYTES NFR BLD AUTO: 8.8 %
MAGNESIUM SERPL-MCNC: 2.13 MG/DL (ref 1.6–2.4)
MCH RBC QN AUTO: 31.9 PG (ref 26–34)
MCH RBC QN AUTO: 32.8 PG (ref 26–34)
MCHC RBC AUTO-ENTMCNC: 32.9 G/DL (ref 32–36)
MCHC RBC AUTO-ENTMCNC: 33.1 G/DL (ref 32–36)
MCV RBC AUTO: 100 FL (ref 80–100)
MCV RBC AUTO: 97 FL (ref 80–100)
MONOCYTES # BLD AUTO: 1.04 X10*3/UL (ref 0.05–0.8)
MONOCYTES NFR BLD AUTO: 12 %
MUCOUS THREADS #/AREA URNS AUTO: ABNORMAL /LPF
NEUTROPHILS # BLD AUTO: 6.75 X10*3/UL (ref 1.6–5.5)
NEUTROPHILS NFR BLD AUTO: 77.8 %
NITRITE UR QL STRIP.AUTO: NEGATIVE
NRBC BLD-RTO: 0 /100 WBCS (ref 0–0)
NRBC BLD-RTO: 0 /100 WBCS (ref 0–0)
PH UR STRIP.AUTO: 5.5 [PH]
PLATELET # BLD AUTO: 107 X10*3/UL (ref 150–450)
PLATELET # BLD AUTO: 112 X10*3/UL (ref 150–450)
POTASSIUM SERPL-SCNC: 3.3 MMOL/L (ref 3.5–5.3)
POTASSIUM SERPL-SCNC: 3.5 MMOL/L (ref 3.5–5.3)
PROT SERPL-MCNC: 5.8 G/DL (ref 6.4–8.2)
PROT SERPL-MCNC: 6.5 G/DL (ref 6.4–8.2)
PROT UR STRIP.AUTO-MCNC: ABNORMAL MG/DL
RBC # BLD AUTO: 2.56 X10*6/UL (ref 4.5–5.9)
RBC # BLD AUTO: 2.63 X10*6/UL (ref 4.5–5.9)
RBC # UR STRIP.AUTO: ABNORMAL /UL
RBC #/AREA URNS AUTO: ABNORMAL /HPF
SARS-COV-2 RNA RESP QL NAA+PROBE: NOT DETECTED
SODIUM SERPL-SCNC: 136 MMOL/L (ref 136–145)
SODIUM SERPL-SCNC: 139 MMOL/L (ref 136–145)
SP GR UR STRIP.AUTO: 1.02
TSH SERPL-ACNC: 2.24 MIU/L (ref 0.44–3.98)
UROBILINOGEN UR STRIP.AUTO-MCNC: NORMAL MG/DL
WBC # BLD AUTO: 8.7 X10*3/UL (ref 4.4–11.3)
WBC # BLD AUTO: 8.9 X10*3/UL (ref 4.4–11.3)
WBC #/AREA URNS AUTO: ABNORMAL /HPF

## 2024-06-07 PROCEDURE — 83605 ASSAY OF LACTIC ACID: CPT | Performed by: EMERGENCY MEDICINE

## 2024-06-07 PROCEDURE — 71045 X-RAY EXAM CHEST 1 VIEW: CPT | Performed by: RADIOLOGY

## 2024-06-07 PROCEDURE — 72125 CT NECK SPINE W/O DYE: CPT

## 2024-06-07 PROCEDURE — 72125 CT NECK SPINE W/O DYE: CPT | Performed by: RADIOLOGY

## 2024-06-07 PROCEDURE — 93005 ELECTROCARDIOGRAM TRACING: CPT

## 2024-06-07 PROCEDURE — A4217 STERILE WATER/SALINE, 500 ML: HCPCS | Performed by: NURSE PRACTITIONER

## 2024-06-07 PROCEDURE — 70450 CT HEAD/BRAIN W/O DYE: CPT

## 2024-06-07 PROCEDURE — 87040 BLOOD CULTURE FOR BACTERIA: CPT | Mod: PARLAB

## 2024-06-07 PROCEDURE — 73502 X-RAY EXAM HIP UNI 2-3 VIEWS: CPT | Mod: RT

## 2024-06-07 PROCEDURE — 85027 COMPLETE CBC AUTOMATED: CPT | Mod: OUT | Performed by: INTERNAL MEDICINE

## 2024-06-07 PROCEDURE — G0378 HOSPITAL OBSERVATION PER HR: HCPCS

## 2024-06-07 PROCEDURE — 99285 EMERGENCY DEPT VISIT HI MDM: CPT | Mod: 25

## 2024-06-07 PROCEDURE — 83735 ASSAY OF MAGNESIUM: CPT

## 2024-06-07 PROCEDURE — 87075 CULTR BACTERIA EXCEPT BLOOD: CPT | Mod: 91,PARLAB

## 2024-06-07 PROCEDURE — 96375 TX/PRO/DX INJ NEW DRUG ADDON: CPT

## 2024-06-07 PROCEDURE — 84075 ASSAY ALKALINE PHOSPHATASE: CPT | Mod: OUT | Performed by: INTERNAL MEDICINE

## 2024-06-07 PROCEDURE — 80053 COMPREHEN METABOLIC PANEL: CPT | Mod: MUE

## 2024-06-07 PROCEDURE — 96361 HYDRATE IV INFUSION ADD-ON: CPT

## 2024-06-07 PROCEDURE — 2500000004 HC RX 250 GENERAL PHARMACY W/ HCPCS (ALT 636 FOR OP/ED)

## 2024-06-07 PROCEDURE — 84484 ASSAY OF TROPONIN QUANT: CPT

## 2024-06-07 PROCEDURE — 96365 THER/PROPH/DIAG IV INF INIT: CPT

## 2024-06-07 PROCEDURE — 2500000004 HC RX 250 GENERAL PHARMACY W/ HCPCS (ALT 636 FOR OP/ED): Performed by: EMERGENCY MEDICINE

## 2024-06-07 PROCEDURE — 93971 EXTREMITY STUDY: CPT | Performed by: RADIOLOGY

## 2024-06-07 PROCEDURE — 36415 COLL VENOUS BLD VENIPUNCTURE: CPT

## 2024-06-07 PROCEDURE — 82306 VITAMIN D 25 HYDROXY: CPT | Mod: OUT,PARLAB | Performed by: INTERNAL MEDICINE

## 2024-06-07 PROCEDURE — 84443 ASSAY THYROID STIM HORMONE: CPT | Mod: OUT | Performed by: INTERNAL MEDICINE

## 2024-06-07 PROCEDURE — 87635 SARS-COV-2 COVID-19 AMP PRB: CPT

## 2024-06-07 PROCEDURE — 81001 URINALYSIS AUTO W/SCOPE: CPT

## 2024-06-07 PROCEDURE — 71045 X-RAY EXAM CHEST 1 VIEW: CPT

## 2024-06-07 PROCEDURE — 70450 CT HEAD/BRAIN W/O DYE: CPT | Performed by: RADIOLOGY

## 2024-06-07 PROCEDURE — 93970 EXTREMITY STUDY: CPT

## 2024-06-07 PROCEDURE — 2500000001 HC RX 250 WO HCPCS SELF ADMINISTERED DRUGS (ALT 637 FOR MEDICARE OP): Performed by: STUDENT IN AN ORGANIZED HEALTH CARE EDUCATION/TRAINING PROGRAM

## 2024-06-07 PROCEDURE — 2500000004 HC RX 250 GENERAL PHARMACY W/ HCPCS (ALT 636 FOR OP/ED): Performed by: NURSE PRACTITIONER

## 2024-06-07 PROCEDURE — 85025 COMPLETE CBC W/AUTO DIFF WBC: CPT

## 2024-06-07 RX ORDER — ACETAMINOPHEN 325 MG/1
650 TABLET ORAL ONCE
Status: COMPLETED | OUTPATIENT
Start: 2024-06-07 | End: 2024-06-07

## 2024-06-07 RX ORDER — MORPHINE SULFATE 4 MG/ML
4 INJECTION, SOLUTION INTRAMUSCULAR; INTRAVENOUS ONCE
Status: COMPLETED | OUTPATIENT
Start: 2024-06-07 | End: 2024-06-07

## 2024-06-07 RX ORDER — CHOLECALCIFEROL (VITAMIN D3) 25 MCG
1000 TABLET ORAL DAILY
Status: DISCONTINUED | OUTPATIENT
Start: 2024-06-07 | End: 2024-06-10 | Stop reason: HOSPADM

## 2024-06-07 RX ORDER — ADHESIVE BANDAGE
30 BANDAGE TOPICAL DAILY PRN
COMMUNITY

## 2024-06-07 RX ORDER — MULTIVIT-MIN/IRON FUM/FOLIC AC 7.5 MG-4
1 TABLET ORAL DAILY
COMMUNITY

## 2024-06-07 RX ORDER — TALC
3 POWDER (GRAM) TOPICAL NIGHTLY
COMMUNITY

## 2024-06-07 RX ORDER — VANCOMYCIN HYDROCHLORIDE 1 G/20ML
INJECTION, POWDER, LYOPHILIZED, FOR SOLUTION INTRAVENOUS DAILY PRN
Status: DISCONTINUED | OUTPATIENT
Start: 2024-06-07 | End: 2024-06-09

## 2024-06-07 RX ORDER — METOPROLOL SUCCINATE 50 MG/1
50 TABLET, EXTENDED RELEASE ORAL DAILY
Status: DISCONTINUED | OUTPATIENT
Start: 2024-06-07 | End: 2024-06-10 | Stop reason: HOSPADM

## 2024-06-07 RX ORDER — TALC
3 POWDER (GRAM) TOPICAL NIGHTLY
Status: DISCONTINUED | OUTPATIENT
Start: 2024-06-07 | End: 2024-06-10 | Stop reason: HOSPADM

## 2024-06-07 RX ORDER — ASPIRIN 325 MG
325 TABLET, DELAYED RELEASE (ENTERIC COATED) ORAL 2 TIMES DAILY
Status: DISCONTINUED | OUTPATIENT
Start: 2024-06-07 | End: 2024-06-10 | Stop reason: HOSPADM

## 2024-06-07 RX ORDER — SIMVASTATIN 20 MG/1
20 TABLET, FILM COATED ORAL DAILY
Status: DISCONTINUED | OUTPATIENT
Start: 2024-06-07 | End: 2024-06-10 | Stop reason: HOSPADM

## 2024-06-07 RX ORDER — TRAMADOL HYDROCHLORIDE 50 MG/1
50 TABLET ORAL EVERY 6 HOURS PRN
Status: DISCONTINUED | OUTPATIENT
Start: 2024-06-07 | End: 2024-06-10 | Stop reason: HOSPADM

## 2024-06-07 RX ORDER — TAMSULOSIN HYDROCHLORIDE 0.4 MG/1
0.4 CAPSULE ORAL DAILY
Status: DISCONTINUED | OUTPATIENT
Start: 2024-06-07 | End: 2024-06-10 | Stop reason: HOSPADM

## 2024-06-07 RX ORDER — ACETAMINOPHEN 325 MG/1
650 TABLET ORAL EVERY 4 HOURS PRN
Status: DISCONTINUED | OUTPATIENT
Start: 2024-06-07 | End: 2024-06-10 | Stop reason: HOSPADM

## 2024-06-07 RX ORDER — CHOLECALCIFEROL (VITAMIN D3) 25 MCG
1000 TABLET ORAL DAILY
COMMUNITY

## 2024-06-07 RX ORDER — ENEMA 19; 7 G/133ML; G/133ML
1 ENEMA RECTAL DAILY PRN
COMMUNITY

## 2024-06-07 RX ORDER — DOCUSATE SODIUM 100 MG/1
100 CAPSULE, LIQUID FILLED ORAL DAILY
Status: DISCONTINUED | OUTPATIENT
Start: 2024-06-07 | End: 2024-06-10

## 2024-06-07 RX ORDER — BISACODYL 10 MG/1
10 SUPPOSITORY RECTAL DAILY PRN
COMMUNITY

## 2024-06-07 RX ORDER — VANCOMYCIN HYDROCHLORIDE 1 G/200ML
1 INJECTION, SOLUTION INTRAVENOUS EVERY 12 HOURS
Status: DISCONTINUED | OUTPATIENT
Start: 2024-06-08 | End: 2024-06-09

## 2024-06-07 RX ADMIN — Medication 3 MG: at 23:33

## 2024-06-07 RX ADMIN — ACETAMINOPHEN 650 MG: 325 TABLET ORAL at 19:05

## 2024-06-07 RX ADMIN — PIPERACILLIN SODIUM AND TAZOBACTAM SODIUM 4.5 G: 4; .5 INJECTION, SOLUTION INTRAVENOUS at 19:05

## 2024-06-07 RX ADMIN — VANCOMYCIN HYDROCHLORIDE 2 G: 10 INJECTION, POWDER, LYOPHILIZED, FOR SOLUTION INTRAVENOUS at 20:18

## 2024-06-07 RX ADMIN — SODIUM CHLORIDE 500 ML: 9 INJECTION, SOLUTION INTRAVENOUS at 17:35

## 2024-06-07 RX ADMIN — SODIUM CHLORIDE 500 ML: 9 INJECTION, SOLUTION INTRAVENOUS at 16:11

## 2024-06-07 RX ADMIN — ASPIRIN 325 MG: 325 TABLET, COATED ORAL at 23:33

## 2024-06-07 RX ADMIN — SODIUM CHLORIDE 1000 ML: 9 INJECTION, SOLUTION INTRAVENOUS at 18:15

## 2024-06-07 RX ADMIN — MORPHINE SULFATE 4 MG: 4 INJECTION, SOLUTION INTRAMUSCULAR; INTRAVENOUS at 20:18

## 2024-06-07 SDOH — ECONOMIC STABILITY: INCOME INSECURITY: IN THE LAST 12 MONTHS, WAS THERE A TIME WHEN YOU WERE NOT ABLE TO PAY THE MORTGAGE OR RENT ON TIME?: NO

## 2024-06-07 SDOH — SOCIAL STABILITY: SOCIAL INSECURITY: HAS ANYONE EVER THREATENED TO HURT YOUR FAMILY OR YOUR PETS?: NO

## 2024-06-07 SDOH — SOCIAL STABILITY: SOCIAL INSECURITY: HAVE YOU HAD THOUGHTS OF HARMING ANYONE ELSE?: NO

## 2024-06-07 SDOH — SOCIAL STABILITY: SOCIAL INSECURITY: ARE THERE ANY APPARENT SIGNS OF INJURIES/BEHAVIORS THAT COULD BE RELATED TO ABUSE/NEGLECT?: NO

## 2024-06-07 SDOH — ECONOMIC STABILITY: HOUSING INSECURITY: IN THE LAST 12 MONTHS, HOW MANY PLACES HAVE YOU LIVED?: 1

## 2024-06-07 SDOH — ECONOMIC STABILITY: TRANSPORTATION INSECURITY
IN THE PAST 12 MONTHS, HAS THE LACK OF TRANSPORTATION KEPT YOU FROM MEDICAL APPOINTMENTS OR FROM GETTING MEDICATIONS?: NO

## 2024-06-07 SDOH — SOCIAL STABILITY: SOCIAL INSECURITY: DO YOU FEEL UNSAFE GOING BACK TO THE PLACE WHERE YOU ARE LIVING?: NO

## 2024-06-07 SDOH — ECONOMIC STABILITY: HOUSING INSECURITY
IN THE LAST 12 MONTHS, WAS THERE A TIME WHEN YOU DID NOT HAVE A STEADY PLACE TO SLEEP OR SLEPT IN A SHELTER (INCLUDING NOW)?: NO

## 2024-06-07 SDOH — SOCIAL STABILITY: SOCIAL INSECURITY: HAVE YOU HAD ANY THOUGHTS OF HARMING ANYONE ELSE?: NO

## 2024-06-07 SDOH — SOCIAL STABILITY: SOCIAL INSECURITY: DOES ANYONE TRY TO KEEP YOU FROM HAVING/CONTACTING OTHER FRIENDS OR DOING THINGS OUTSIDE YOUR HOME?: NO

## 2024-06-07 SDOH — ECONOMIC STABILITY: TRANSPORTATION INSECURITY
IN THE PAST 12 MONTHS, HAS LACK OF TRANSPORTATION KEPT YOU FROM MEETINGS, WORK, OR FROM GETTING THINGS NEEDED FOR DAILY LIVING?: NO

## 2024-06-07 SDOH — SOCIAL STABILITY: SOCIAL INSECURITY: ARE YOU OR HAVE YOU BEEN THREATENED OR ABUSED PHYSICALLY, EMOTIONALLY, OR SEXUALLY BY ANYONE?: NO

## 2024-06-07 SDOH — SOCIAL STABILITY: SOCIAL INSECURITY: ABUSE: ADULT

## 2024-06-07 SDOH — ECONOMIC STABILITY: INCOME INSECURITY: HOW HARD IS IT FOR YOU TO PAY FOR THE VERY BASICS LIKE FOOD, HOUSING, MEDICAL CARE, AND HEATING?: NOT HARD AT ALL

## 2024-06-07 SDOH — SOCIAL STABILITY: SOCIAL INSECURITY: DO YOU FEEL ANYONE HAS EXPLOITED OR TAKEN ADVANTAGE OF YOU FINANCIALLY OR OF YOUR PERSONAL PROPERTY?: NO

## 2024-06-07 ASSESSMENT — COGNITIVE AND FUNCTIONAL STATUS - GENERAL
MOBILITY SCORE: 12
WALKING IN HOSPITAL ROOM: TOTAL
TURNING FROM BACK TO SIDE WHILE IN FLAT BAD: A LITTLE
PATIENT BASELINE BEDBOUND: NO
DRESSING REGULAR LOWER BODY CLOTHING: A LOT
DRESSING REGULAR UPPER BODY CLOTHING: A LOT
DAILY ACTIVITIY SCORE: 13
HELP NEEDED FOR BATHING: A LOT
MOVING FROM LYING ON BACK TO SITTING ON SIDE OF FLAT BED WITH BEDRAILS: A LITTLE
MOVING TO AND FROM BED TO CHAIR: A LOT
PERSONAL GROOMING: A LOT
TOILETING: A LOT
STANDING UP FROM CHAIR USING ARMS: A LOT
CLIMB 3 TO 5 STEPS WITH RAILING: TOTAL
EATING MEALS: A LITTLE

## 2024-06-07 ASSESSMENT — ACTIVITIES OF DAILY LIVING (ADL)
HEARING - LEFT EAR: FUNCTIONAL
HEARING - RIGHT EAR: FUNCTIONAL
TOILETING: NEEDS ASSISTANCE
PATIENT'S MEMORY ADEQUATE TO SAFELY COMPLETE DAILY ACTIVITIES?: YES
ADEQUATE_TO_COMPLETE_ADL: YES
GROOMING: NEEDS ASSISTANCE
WALKS IN HOME: DEPENDENT
FEEDING YOURSELF: INDEPENDENT
BATHING: NEEDS ASSISTANCE
JUDGMENT_ADEQUATE_SAFELY_COMPLETE_DAILY_ACTIVITIES: YES
DRESSING YOURSELF: NEEDS ASSISTANCE

## 2024-06-07 ASSESSMENT — LIFESTYLE VARIABLES
HOW OFTEN DO YOU HAVE 6 OR MORE DRINKS ON ONE OCCASION: NEVER
HAVE YOU EVER FELT YOU SHOULD CUT DOWN ON YOUR DRINKING: NO
EVER HAD A DRINK FIRST THING IN THE MORNING TO STEADY YOUR NERVES TO GET RID OF A HANGOVER: NO
EVER FELT BAD OR GUILTY ABOUT YOUR DRINKING: NO
HAVE PEOPLE ANNOYED YOU BY CRITICIZING YOUR DRINKING: NO
SUBSTANCE_ABUSE_PAST_12_MONTHS: NO
PRESCIPTION_ABUSE_PAST_12_MONTHS: NO
AUDIT-C TOTAL SCORE: 1
HOW OFTEN DO YOU HAVE A DRINK CONTAINING ALCOHOL: MONTHLY OR LESS
HOW MANY STANDARD DRINKS CONTAINING ALCOHOL DO YOU HAVE ON A TYPICAL DAY: 1 OR 2
SKIP TO QUESTIONS 9-10: 1
TOTAL SCORE: 0
AUDIT-C TOTAL SCORE: 1

## 2024-06-07 ASSESSMENT — COLUMBIA-SUICIDE SEVERITY RATING SCALE - C-SSRS
1. IN THE PAST MONTH, HAVE YOU WISHED YOU WERE DEAD OR WISHED YOU COULD GO TO SLEEP AND NOT WAKE UP?: NO
6. HAVE YOU EVER DONE ANYTHING, STARTED TO DO ANYTHING, OR PREPARED TO DO ANYTHING TO END YOUR LIFE?: NO
2. HAVE YOU ACTUALLY HAD ANY THOUGHTS OF KILLING YOURSELF?: NO
1. IN THE PAST MONTH, HAVE YOU WISHED YOU WERE DEAD OR WISHED YOU COULD GO TO SLEEP AND NOT WAKE UP?: NO
6. HAVE YOU EVER DONE ANYTHING, STARTED TO DO ANYTHING, OR PREPARED TO DO ANYTHING TO END YOUR LIFE?: NO
2. HAVE YOU ACTUALLY HAD ANY THOUGHTS OF KILLING YOURSELF?: NO

## 2024-06-07 ASSESSMENT — PAIN DESCRIPTION - ONSET: ONSET: SUDDEN

## 2024-06-07 ASSESSMENT — PATIENT HEALTH QUESTIONNAIRE - PHQ9
2. FEELING DOWN, DEPRESSED OR HOPELESS: NOT AT ALL
1. LITTLE INTEREST OR PLEASURE IN DOING THINGS: NOT AT ALL
SUM OF ALL RESPONSES TO PHQ9 QUESTIONS 1 & 2: 0

## 2024-06-07 ASSESSMENT — PAIN DESCRIPTION - DESCRIPTORS: DESCRIPTORS: ACHING;SORE

## 2024-06-07 ASSESSMENT — PAIN DESCRIPTION - LOCATION: LOCATION: LEG

## 2024-06-07 ASSESSMENT — PAIN DESCRIPTION - FREQUENCY: FREQUENCY: CONSTANT/CONTINUOUS

## 2024-06-07 ASSESSMENT — PAIN DESCRIPTION - ORIENTATION: ORIENTATION: RIGHT

## 2024-06-07 ASSESSMENT — PAIN - FUNCTIONAL ASSESSMENT
PAIN_FUNCTIONAL_ASSESSMENT: 0-10
PAIN_FUNCTIONAL_ASSESSMENT: 0-10

## 2024-06-07 ASSESSMENT — PAIN DESCRIPTION - PROGRESSION: CLINICAL_PROGRESSION: NOT CHANGED

## 2024-06-07 ASSESSMENT — PAIN SCALES - GENERAL
PAINLEVEL_OUTOF10: 6
PAINLEVEL_OUTOF10: 0 - NO PAIN

## 2024-06-07 NOTE — H&P
"HPI:  Kit Franklin is a 78 y.o. year old male with a history of HTN, HLD and right hip arthritis s/p recent THR (on 06/04/24) after which he was discharged to SNF for further rehabilitation yesterday. Patient returned to [facility] today due to sustaining two falls since discharge. No obvious injury or complaints. Notes generalized weakness and legs \"giving out.\"    In the ER, initial temp 38.2C,  and /86. CT head and right hip X-ray negative for acute process. Labs with Hgb 8.4 (similar to previous at discharge), potassium 3.3. UA negative. Patient given vancomycin and zosyn in the ER as well as 2 L IVF.     A 10 point ROS was performed with the patient denying any complaint at this time aside from those listed in the HPI above.     Past Medical History: as above  Past Surgical History: reviewed   Family History: noncontributory  Social History: denies current smoking, EtOH, illicit drug use.    Visit Vitals  /78 (BP Location: Right arm, Patient Position: Sitting)   Pulse (!) 124   Temp (!) 38.2 °C (100.8 °F) (Skin)   Resp (!) 22      Physical Exam:   GENERAL: No apparent distress.   HEAD:  Normocephalic, atraumatic.  EYES:  Round and reactive.  ENT:  No nasal discharge, mucous membranes moist and pink.  NECK:  Atraumatic, no meningismus.  CARDIOVASCULAR:  Tachycardic, no murmur.   RESPIRATORY:  CTAB, no active work of breathing.   ABDOMEN:  Soft, no tenderness, nondistended.  EXTREMITIES: No calf tenderness.  SKIN:  Warm and dry.  NEUROLOGICAL:  Answering questions appropriately.     Lab Results   Component Value Date    WBC 8.7 06/07/2024    HGB 8.4 (L) 06/07/2024    HCT 25.4 (L) 06/07/2024    MCV 97 06/07/2024     (L) 06/07/2024      Lab Results   Component Value Date    GLUCOSE 120 (H) 06/07/2024    CALCIUM 9.0 06/07/2024     06/07/2024    K 3.3 (L) 06/07/2024    CO2 26 06/07/2024     06/07/2024    BUN 15 06/07/2024    CREATININE 0.77 06/07/2024      Medications:  vancomycin, " 2 g, intravenous, Once       Assessment/ Plan:  Kit Franklin is a 78 y.o. year old male with a history of HTN, HLD and right hip arthritis s/p recent THR (on 06/04/24) presented to [facility] for recurrent falls.    #SIRS   #Ambulatory dysfunction  -Obtain blood cx.  CXR and UA negative.   -Empiric vancomycin and zosyn for now. Follow up infectious workup and adjust as appropriate. No evidence of hardware failure on imaging. Continue to monitor and consider Ortho consult if needed.  -Obtain LE duplex considering recent surgery.  -Cont  mg BID per previous Ortho discharge recommendations.  -Will obtain formal EKG and resume home Toprol accordingly as below.     #HTN  #HLD  #BPH  -Cont home Toprol, Simvastatin and Flomax.     Diet: regular   DVT ppx:  mg BID     Please await final attending physician attestation.     Ke Marroquin, DO   Internal Medicine PGY-3

## 2024-06-07 NOTE — ED PROVIDER NOTES
HPI   Chief Complaint   Patient presents with    Fall     BIBA, patient states that he fell 2x in the past 24 hours, denies hitting his head/blood thinners/loc, states that he is s/p hip replacement        Kit is a 78-year-old male with past medical history of hypertension, pancreatitis, and osteoarthritis s/p right hip replacement on 6/4/2024 performed by Dr. Kat presenting emergency room and by ambulance for a fall.  After the procedure, patient was placed into a rehab facility.  Just prior to arrival, patient had a fall at the facility.  He was brought here to the emergency room by EMS.  He states that he was going to the bathroom, stood up, and then his right leg gave out underneath him.  He fell directly onto his right hip.  Patient currently denies any right hip pain.  He states that this is the second time that he is fallen in the past two days.  He does not currently take any blood thinners.  He did not lose consciousness or hit his head during the falls.  His right leg feels more weak and numb since the surgery.  Denies any other areas of focal numbness or tingling, but states that he does feel generally weak.  He denies any recent viral symptoms of cough, congestion, headache.  No chest pain or shortness of breath.  No fever, chills, nausea, vomiting, abdominal pain, urinary symptoms.                           Jeromy Coma Scale Score: 15                     Patient History   Past Medical History:   Diagnosis Date    Arthritis     HL (hearing loss)     Hypertension     Nephrolithiasis     Personal history of other diseases of the circulatory system 03/09/2021    History of hypertension    Personal history of other diseases of the digestive system 03/09/2021    History of pancreatitis    Personal history of other diseases of the musculoskeletal system and connective tissue 03/09/2021    History of back pain    Personal history of other diseases of the musculoskeletal system and connective tissue  2021    History of back pain    Personal history of other endocrine, nutritional and metabolic disease 2021    History of hypercholesterolemia     Past Surgical History:   Procedure Laterality Date    OTHER SURGICAL HISTORY  2008    Cervical vertebral fusion    OTHER SURGICAL HISTORY  2019    Colectomy    OTHER SURGICAL HISTORY  2021    Colonoscopy    OTHER SURGICAL HISTORY  2021    Skin biopsy    OTHER SURGICAL HISTORY  2015 Right; 2017 Left    Total Knee Replacement    SHOULDER SURGERY Left 2013    Shoulder Surgery     Family History   Problem Relation Name Age of Onset    Dementia Mother      Hypertension Mother      Other (Senile dementia) Mother      Dementia Father      Diabetes Father      Hypertension Father      Mitral valve prolapse Father      Heart attack Father      Other (Senile dementia) Father       Social History     Tobacco Use    Smoking status: Former     Current packs/day: 0.00     Types: Cigarettes     Quit date:      Years since quittin.4     Passive exposure: Past    Smokeless tobacco: Never   Vaping Use    Vaping status: Never Used   Substance Use Topics    Alcohol use: Yes     Alcohol/week: 10.0 standard drinks of alcohol     Types: 10 Cans of beer per week    Drug use: Never       Physical Exam   ED Triage Vitals [24 1530]   Temperature Heart Rate Respirations BP   (!) 38.2 °C (100.8 °F) (!) 114 20 114/86      Pulse Ox Temp Source Heart Rate Source Patient Position   97 % Skin Monitor Sitting      BP Location FiO2 (%)     Right arm --       Physical Exam  HENT:      Head:      Comments: No Soto sign or raccoon eyes.     Right Ear: Tympanic membrane, ear canal and external ear normal.      Left Ear: Tympanic membrane, ear canal and external ear normal.      Ears:      Comments: No hemotympanum.     Mouth/Throat:      Mouth: Mucous membranes are dry.      Pharynx: Oropharynx is clear.   Eyes:      Extraocular Movements: Extraocular movements intact.    Cardiovascular:      Rate and Rhythm: Regular rhythm. Tachycardia present.      Pulses: Normal pulses.   Pulmonary:      Effort: Pulmonary effort is normal.      Breath sounds: Normal breath sounds.   Abdominal:      General: Abdomen is flat. There is distension.      Palpations: Abdomen is soft.      Tenderness: There is no abdominal tenderness. There is no guarding or rebound.      Comments: Patient still have any tenderness palpation of his abdomen.  He states that is normally distended.  No guarding or rebound.   Musculoskeletal:      Cervical back: Normal range of motion. No rigidity or tenderness.      Comments: No obvious hip dislocation or fracture.   Neurological:      General: No focal deficit present.      Mental Status: He is alert and oriented to person, place, and time.      Comments: Patient does have limited range of motion of his right leg, but this is secondary to recent right hip replacement.  He is able to follow all my commands.  No focal deficit.         ED Course & [facility]   ED Course as of 06/07/24 1646   Fri Jun 07, 2024   1557 EKG sinus tachycardia 112 bpm.  No STEMI.  ND 98.  QTc 496. [AH]   1617 HEMOGLOBIN(!): 8.4 [AH]   1617 HEMATOCRIT(!): 25.4 [AH]   1646 POTASSIUM(!): 3.3 [AH]   1646 GLUCOSE(!): 120 [AH]   1646 AST(!): 62 [AH]      ED Course User Index  [AH] Mckenna Mackenzie PA-C       Medical Decision Making  Kit is a 78-year-old male with past medical history of hypertension, pancreatitis, and osteoarthritis s/p right hip replacement on 6/4/2024 performed by Dr. Kat presenting emergency room and by ambulance for a fall.  Patient states over the past couple days, he has fallen down twice.  Today, he was try to get off the toilet, got weak, and fell onto his right side.  He does not have any right hip pain at the moment and denies any loss consciousness, blood thinners, or head injury.    Medical Decision Making: He did not appear ill or toxic.  Vital signs reviewed and remarkable  for tachycardia of 114 bpm and elevated temperature of 100.8 °F.  Workup included CBC, CMP, magnesium, troponin, PCR swab for COVID, UA, EKG, CT head and neck without IV contrast, chest x-ray, right hip/pelvic x-ray.  There is no leukocytosis on CBC.  H&H are decreased at 8.4 and 5.4.  Patient has a history of anemia and did have recent surgery.  CMP shows mild hypokalemia of 3.3.  No other critical electrolyte finding or TASH.  Workup pending.     Differential diagnoses considered: Intracranial hemorrhage, fracture, dislocation, strain/sprain, TASH, electrolyte disturbance, arrhythmia, pneumonia, pleural effusion, pneumothorax, NSTEMI, STEMI, etc.    Medications given: 500 mL normal saline bolus.      Diagnosis: Pending    Plan: Pending          Procedure  Procedures     Mckenna Mackenzie PA-C  06/07/24 0065

## 2024-06-07 NOTE — ED TRIAGE NOTES
BIBA, patient states that he fell 2x in the past 24 hours, denies hitting his head/blood thinners/loc, states that he is s/p hip replacement

## 2024-06-07 NOTE — PROGRESS NOTES
Pharmacy Medication History Review    Kit Franklin is a 78 y.o. male admitted for No Principal Problem: There is no principal problem currently on the Problem List. Please update the Problem List and refresh.. Pharmacy reviewed the patient's [id number] medications and allergies for accuracy.    The list below reflectives the updated PTA list. Please review each medication in order reconciliation for additional clarification and justification.  Prior to Admission medications    Medication Sig Start Date End Date Taking? Authorizing Provider   acetaminophen (Tylenol) 325 mg tablet Take 2 tablets (650 mg) by mouth every 6 hours.  Patient taking differently: Take 2 tablets (650 mg) by mouth every 6 hours if needed for mild pain (1 - 3) or fever (temp greater than 38.0 C). 6/5/24   BASILIO Cordova   aspirin 325 mg EC tablet Take 1 tablet (325 mg) by mouth 2 times a day. 6/5/24 7/5/24  BASILIO Cordova   bisacodyl (Dulcolax) 10 mg suppository Insert 1 suppository (10 mg) into the rectum once daily as needed for constipation.    Historical Provider, MD   cholecalciferol (Vitamin D-3) 25 MCG (1000 UT) tablet Take 1 tablet (1,000 Units) by mouth once daily.    Historical Provider, MD   cholestyramine-aspartame (Prevalite) 4 gram packet Take 1 packet (4 g) by mouth 2 times a day as needed (diarrhea). 11/2/23 11/1/24  Elias Morley, DO   docusate sodium (Colace) 100 mg capsule Take 1 capsule (100 mg) by mouth once daily. 6/5/24   GOOD Cordova-CNS   magnesium hydroxide (Milk of Magnesia) 400 mg/5 mL suspension Take 30 mL by mouth once daily as needed for constipation.    Historical Provider, MD   melatonin 3 mg tablet Take 1 tablet (3 mg) by mouth once daily at bedtime.    Historical Provider, MD   metoprolol succinate XL (Toprol-XL) 50 mg 24 hr tablet Take 1 tablet (50 mg) by mouth once daily. 12/7/23 12/6/24  Elias Morley, DO   multivitamin with minerals tablet Take 1 tablet by  mouth once daily.    Historical Provider, MD   oxyCODONE (Roxicodone) 5 mg immediate release tablet Take 1-2 tablets (5-10 mg) by mouth every 6 hours if needed for severe pain (7 - 10). 6/6/24   BASILIO Cordova   simvastatin (Zocor) 20 mg tablet Take 1 tablet (20 mg) by mouth once daily. 4/26/24   Elias Morley,    sodium phosphates (Fleet Enema) 19-7 gram/118 mL enema enema Insert 133 mL (1 enema) into the rectum once daily as needed for constipation.    Historical Provider, MD   tamsulosin (Flomax) 0.4 mg 24 hr capsule Take 1 tablet by mouth once daily. 4/2/23   Historical Provider, MD   traMADol (Ultram) 50 mg tablet Take 1 tablet (50 mg) by mouth every 6 hours if needed for moderate pain (4 - 6) for up to 7 days. 6/5/24 6/12/24  BASILIO Cordova        The list below reflectives the updated allergy list. Please review each documented allergy for additional clarification and justification.  Allergies  Reviewed by Zayda Zapata LPN on 6/7/2024        Severity Reactions Comments    Lisinopril Medium Cough     Meperidine Medium Nausea/vomiting     Adhesive Tape-silicones Low Rash             Below are additional concerns with the patient's PTA list.      Mari Cabezas

## 2024-06-07 NOTE — PROGRESS NOTES
Emergency Medicine Transition of Care Note.    I received Kit Franklin in signout from Seer CASEY.  Please see the previous ED provider note for all HPI, PE and MDM up to the time of signout at 1700. This is in addition to the primary record.    In brief Kit Franklin is an 78 y.o. male presenting for   Chief Complaint   Patient presents with    Fall     BIBA, patient states that he fell 2x in the past 24 hours, denies hitting his head/blood thinners/loc, states that he is s/p hip replacement      At the time of signout we were awaiting: CT of the cervical spine and head    ED Course as of 06/07/24 1930 Fri Jun 07, 2024   1557 EKG sinus tachycardia 112 bpm.  No STEMI.  TX 98.  QTc 496. [AH]   1617 HEMOGLOBIN(!): 8.4 [AH]   1617 HEMATOCRIT(!): 25.4 [AH]   1646 POTASSIUM(!): 3.3 [AH]   1646 GLUCOSE(!): 120 [AH]   1646 AST(!): 62 [AH]   1732 XR chest 1 view  1. Enlarged cardiac silhouette with pulmonary vascular congestion. [AH]   1732 XR hip right with pelvis when performed 2 or 3 views  No hardware failure about the right total hip arthroplasty [AH]   1838 CT of the head reveals  FINDINGS:  No focal mass effect or midline shift is identified. The ventricles  and sulci are symmetric and appropriate for the patient's age.      The gray white matter differentiation is preserved.      No acute intracranial hemorrhage is seen. No intra-axial or  extra-axial fluid collection is seen.      The visualized paranasal sinuses and mastoid air cells are clear.   [EC]   1839 CT cervical spine reveals  IMPRESSION:  1. No acute fracture or spondylolisthesis.  2. Advanced degenerative changes of the cervical spine as described  in the body of the report.   [EC]      ED Course User Index  [AH] Mckenna Mackenzie PA-C  [EC] GOOD [place]-CNP         Diagnoses as of 06/07/24 1930   Fall, initial encounter   General weakness   Tachycardia       Medical Decision Making  Patient care was signed out to me at this time.  Please  refer to initial providers note for additional plan of care of this patient.  Patient care was signed out pending CT of the head and CT of the cervical spine.  CT of the head and cervical spine as interpreted by radiologist reveals no acute findings.  Patient's heart rate has persistently remained elevated at 124 with a fever of 100.8, sepsis protocol was initiated patient received Tylenol, IV fluid, vancomycin and Zosyn in the emergency room.  Urinalysis unremarkable as well, there is no known source of sepsis.  Given patient's ongoing generalized weakness, recent fall and sepsis concern we recommended admission to the hospital which patient was agreeable with.  I consult Dr. Merrill in regards to admission for generalized weakness, recent fall and sepsis concern.  Dr. Merrill has agreed admit patient to his service and will manage inpatient care.    Final diagnoses:   [W19.XXXA] Fall, initial encounter   [R53.1] General weakness   [R00.0] Tachycardia           Procedure  Procedures    Deepak Donaldson, APRN-CNP

## 2024-06-08 PROBLEM — A41.9 SEPSIS (MULTI): Status: ACTIVE | Noted: 2024-06-08

## 2024-06-08 LAB
ANION GAP SERPL CALC-SCNC: 13 MMOL/L (ref 10–20)
BUN SERPL-MCNC: 10 MG/DL (ref 6–23)
CALCIUM SERPL-MCNC: 8.4 MG/DL (ref 8.6–10.3)
CHLORIDE SERPL-SCNC: 105 MMOL/L (ref 98–107)
CO2 SERPL-SCNC: 24 MMOL/L (ref 21–32)
CREAT SERPL-MCNC: 0.69 MG/DL (ref 0.5–1.3)
EGFRCR SERPLBLD CKD-EPI 2021: >90 ML/MIN/1.73M*2
ERYTHROCYTE [DISTWIDTH] IN BLOOD BY AUTOMATED COUNT: 13.6 % (ref 11.5–14.5)
GLUCOSE SERPL-MCNC: 101 MG/DL (ref 74–99)
HCT VFR BLD AUTO: 23.4 % (ref 41–52)
HGB BLD-MCNC: 7.8 G/DL (ref 13.5–17.5)
HOLD SPECIMEN: NORMAL
HOLD SPECIMEN: NORMAL
MAGNESIUM SERPL-MCNC: 2.26 MG/DL (ref 1.6–2.4)
MCH RBC QN AUTO: 32.1 PG (ref 26–34)
MCHC RBC AUTO-ENTMCNC: 33.3 G/DL (ref 32–36)
MCV RBC AUTO: 96 FL (ref 80–100)
NRBC BLD-RTO: 0 /100 WBCS (ref 0–0)
PLATELET # BLD AUTO: 130 X10*3/UL (ref 150–450)
POTASSIUM SERPL-SCNC: 3.4 MMOL/L (ref 3.5–5.3)
RBC # BLD AUTO: 2.43 X10*6/UL (ref 4.5–5.9)
SODIUM SERPL-SCNC: 139 MMOL/L (ref 136–145)
WBC # BLD AUTO: 7.4 X10*3/UL (ref 4.4–11.3)

## 2024-06-08 PROCEDURE — 36415 COLL VENOUS BLD VENIPUNCTURE: CPT | Performed by: STUDENT IN AN ORGANIZED HEALTH CARE EDUCATION/TRAINING PROGRAM

## 2024-06-08 PROCEDURE — 80048 BASIC METABOLIC PNL TOTAL CA: CPT | Performed by: STUDENT IN AN ORGANIZED HEALTH CARE EDUCATION/TRAINING PROGRAM

## 2024-06-08 PROCEDURE — 2500000001 HC RX 250 WO HCPCS SELF ADMINISTERED DRUGS (ALT 637 FOR MEDICARE OP): Performed by: STUDENT IN AN ORGANIZED HEALTH CARE EDUCATION/TRAINING PROGRAM

## 2024-06-08 PROCEDURE — 85027 COMPLETE CBC AUTOMATED: CPT | Performed by: STUDENT IN AN ORGANIZED HEALTH CARE EDUCATION/TRAINING PROGRAM

## 2024-06-08 PROCEDURE — 97166 OT EVAL MOD COMPLEX 45 MIN: CPT | Mod: GO

## 2024-06-08 PROCEDURE — 1200000002 HC GENERAL ROOM WITH TELEMETRY DAILY

## 2024-06-08 PROCEDURE — 83735 ASSAY OF MAGNESIUM: CPT | Performed by: STUDENT IN AN ORGANIZED HEALTH CARE EDUCATION/TRAINING PROGRAM

## 2024-06-08 PROCEDURE — 97161 PT EVAL LOW COMPLEX 20 MIN: CPT | Mod: GP

## 2024-06-08 PROCEDURE — 99223 1ST HOSP IP/OBS HIGH 75: CPT | Performed by: STUDENT IN AN ORGANIZED HEALTH CARE EDUCATION/TRAINING PROGRAM

## 2024-06-08 PROCEDURE — 2500000004 HC RX 250 GENERAL PHARMACY W/ HCPCS (ALT 636 FOR OP/ED): Performed by: STUDENT IN AN ORGANIZED HEALTH CARE EDUCATION/TRAINING PROGRAM

## 2024-06-08 PROCEDURE — 2500000002 HC RX 250 W HCPCS SELF ADMINISTERED DRUGS (ALT 637 FOR MEDICARE OP, ALT 636 FOR OP/ED): Mod: MUE | Performed by: STUDENT IN AN ORGANIZED HEALTH CARE EDUCATION/TRAINING PROGRAM

## 2024-06-08 RX ORDER — ACETAMINOPHEN 500 MG
5 TABLET ORAL NIGHTLY PRN
Status: DISCONTINUED | OUTPATIENT
Start: 2024-06-08 | End: 2024-06-08

## 2024-06-08 RX ADMIN — Medication 1000 UNITS: at 08:51

## 2024-06-08 RX ADMIN — PIPERACILLIN SODIUM AND TAZOBACTAM SODIUM 3.38 G: 3; .375 INJECTION, SOLUTION INTRAVENOUS at 13:54

## 2024-06-08 RX ADMIN — PIPERACILLIN SODIUM AND TAZOBACTAM SODIUM 3.38 G: 3; .375 INJECTION, SOLUTION INTRAVENOUS at 06:35

## 2024-06-08 RX ADMIN — SIMVASTATIN 20 MG: 20 TABLET, FILM COATED ORAL at 19:49

## 2024-06-08 RX ADMIN — VANCOMYCIN HYDROCHLORIDE 1 G: 1 INJECTION, SOLUTION INTRAVENOUS at 10:37

## 2024-06-08 RX ADMIN — TAMSULOSIN HYDROCHLORIDE 0.4 MG: 0.4 CAPSULE ORAL at 08:50

## 2024-06-08 RX ADMIN — DOCUSATE SODIUM 100 MG: 100 CAPSULE, LIQUID FILLED ORAL at 08:50

## 2024-06-08 RX ADMIN — METOPROLOL SUCCINATE 50 MG: 50 TABLET, EXTENDED RELEASE ORAL at 08:51

## 2024-06-08 RX ADMIN — PIPERACILLIN SODIUM AND TAZOBACTAM SODIUM 3.38 G: 3; .375 INJECTION, SOLUTION INTRAVENOUS at 19:09

## 2024-06-08 RX ADMIN — Medication 3 MG: at 20:19

## 2024-06-08 RX ADMIN — VANCOMYCIN HYDROCHLORIDE 1 G: 1 INJECTION, SOLUTION INTRAVENOUS at 22:09

## 2024-06-08 RX ADMIN — PIPERACILLIN SODIUM AND TAZOBACTAM SODIUM 3.38 G: 3; .375 INJECTION, SOLUTION INTRAVENOUS at 01:17

## 2024-06-08 RX ADMIN — ASPIRIN 325 MG: 325 TABLET, COATED ORAL at 08:51

## 2024-06-08 RX ADMIN — ASPIRIN 325 MG: 325 TABLET, COATED ORAL at 19:49

## 2024-06-08 RX ADMIN — TRAMADOL HYDROCHLORIDE 50 MG: 50 TABLET, COATED ORAL at 10:30

## 2024-06-08 ASSESSMENT — COGNITIVE AND FUNCTIONAL STATUS - GENERAL
DRESSING REGULAR LOWER BODY CLOTHING: A LOT
PERSONAL GROOMING: A LITTLE
MOVING FROM LYING ON BACK TO SITTING ON SIDE OF FLAT BED WITH BEDRAILS: A LOT
MOVING FROM LYING ON BACK TO SITTING ON SIDE OF FLAT BED WITH BEDRAILS: A LITTLE
WALKING IN HOSPITAL ROOM: A LOT
TURNING FROM BACK TO SIDE WHILE IN FLAT BAD: A LOT
TOILETING: A LOT
STANDING UP FROM CHAIR USING ARMS: A LOT
HELP NEEDED FOR BATHING: A LOT
DRESSING REGULAR UPPER BODY CLOTHING: A LITTLE
HELP NEEDED FOR BATHING: A LOT
MOBILITY SCORE: 12
STANDING UP FROM CHAIR USING ARMS: A LOT
TURNING FROM BACK TO SIDE WHILE IN FLAT BAD: A LITTLE
MOVING TO AND FROM BED TO CHAIR: A LOT
MOBILITY SCORE: 12
DRESSING REGULAR LOWER BODY CLOTHING: TOTAL
CLIMB 3 TO 5 STEPS WITH RAILING: TOTAL
WALKING IN HOSPITAL ROOM: TOTAL
MOVING TO AND FROM BED TO CHAIR: A LOT
DRESSING REGULAR UPPER BODY CLOTHING: A LITTLE
DAILY ACTIVITIY SCORE: 14
HELP NEEDED FOR BATHING: A LOT
PERSONAL GROOMING: A LITTLE
DAILY ACTIVITIY SCORE: 14
MOVING TO AND FROM BED TO CHAIR: A LOT
MOBILITY SCORE: 12
DRESSING REGULAR LOWER BODY CLOTHING: TOTAL
MOVING FROM LYING ON BACK TO SITTING ON SIDE OF FLAT BED WITH BEDRAILS: A LOT
TURNING FROM BACK TO SIDE WHILE IN FLAT BAD: A LOT
PERSONAL GROOMING: A LOT
TOILETING: TOTAL
CLIMB 3 TO 5 STEPS WITH RAILING: A LOT
WALKING IN HOSPITAL ROOM: A LOT
DRESSING REGULAR UPPER BODY CLOTHING: A LOT
STANDING UP FROM CHAIR USING ARMS: A LOT
TOILETING: TOTAL
DAILY ACTIVITIY SCORE: 14
CLIMB 3 TO 5 STEPS WITH RAILING: A LOT

## 2024-06-08 ASSESSMENT — PAIN SCALES - GENERAL
PAINLEVEL_OUTOF10: 4
PAINLEVEL_OUTOF10: 0 - NO PAIN
PAINLEVEL_OUTOF10: 0 - NO PAIN
PAINLEVEL_OUTOF10: 3
PAINLEVEL_OUTOF10: 5 - MODERATE PAIN
PAINLEVEL_OUTOF10: 4

## 2024-06-08 ASSESSMENT — PAIN - FUNCTIONAL ASSESSMENT
PAIN_FUNCTIONAL_ASSESSMENT: 0-10

## 2024-06-08 NOTE — PROGRESS NOTES
Occupational Therapy    Evaluation    Patient Name: Kit Franklin  MRN: [id number]  Today's Date: 6/8/2024  Time Calculation  Start Time: 1350  Stop Time: 1414  Time Calculation (min): 24 min  308/308-A    Assessment  IP OT Assessment  OT Assessment: Patient would benefit from further OT to address ADL's and functional transfers/mobility since high risk for further falls while recovering from right THR done 6/4/2024; transferred to SNF facility.  Fell twice at SNF thus hospital re-admitted 6/7/2024 to [facility].  Prognosis: Good  End of Session Communication: Bedside nurse  End of Session Patient Position: Bed, 2 rail up, Alarm on; call-light within reach    Plan:  Treatment Interventions: ADL retraining, Functional transfer training, Patient/family training, Equipment evaluation/education (THR precaution training)  OT Frequency: 5 times per week  OT Discharge Recommendations: Moderate intensity level of continued care  OT - OK to Discharge: Yes (to next level of care when medically cleared by physician/medical team)    Subjective   Current Problem:  1. Sepsis (Multi)        2. Fall, initial encounter        3. General weakness        4. Tachycardia        5. Weakness generalized  CANCELED: Lower extremity venous duplex bilateral    CANCELED: Lower extremity venous duplex bilateral      6. Leg swelling  Lower extremity venous duplex bilateral    Lower extremity venous duplex bilateral    CANCELED: Lower extremity venous duplex bilateral    CANCELED: Lower extremity venous duplex bilateral        General:  General  Reason for Referral: recent surgery  Referred By: Morro Merrill DO  Past Medical History Relevant to Rehab: HTN, HLD and right hip arthritis s/p recent THR (on 06/04/24)  Co-Treatment: PT (co-eval)  Prior to Session Communication: Bedside nurse who confirmed that patient is medically stable to participate in this OT session  Patient Position Received: Bed, 2 rail up, Alarm off, not on at start of  session  General Comment: Patient seen in room 308; cooperative, motivated    Precautions:  LE Weight Bearing Status: Weight Bearing as Tolerated (RLE)  Medical Precautions: Fall precautions  Post-Surgical Precautions: Right hip precautions    Pain:  Pain Assessment  Pain Assessment: 0-10  Pain Score: 4  Pain Location:  (heel, RLE/calf)    Objective   Cognition:  Overall Cognitive Status: Within Functional Limits  Orientation Level: Oriented X4     Home Living:  Type of Home: House  Lives With: Spouse  Home Adaptive Equipment: Walker rolling or standard, Cane  Home Layout: Multi-level  Home Access: Stairs to enter without rails (1)  Bathroom Shower/Tub: Tub/shower unit  Bathroom Toilet:  (higher height toilet)  Bathroom Equipment: Grab bars in shower, Hand-held shower hose (shower seat)  Home Living Comments: sleeps in recliner chair     Prior Function:  Level of [place]: Independent with ADLs and functional transfers  Ambulatory Assistance: Independent (no device)  Hand Dominance: Right  Prior Function Comments: drives, shops    Current ADL:  ADL Comments: to further address lower body self-care using assisitive techniques/equipment as needed in order to adhere to post-op hip precautions    Activity Tolerance:  Endurance:  (easily fatigues with mobility thus requiring intermittent rest breaks)    Bed Mobility/Transfers:   Bed Mobility  Bed Mobility: Yes  Bed Mobility 1  Bed Mobility 1: Supine to sitting  Level of Assistance 1: Moderate assistance, +2, Maximum verbal cues, Maximum tactile cues  Bed Mobility Comments 1: HOB elevated  Bed Mobility 2  Bed Mobility  2: Sitting to supine  Level of Assistance 2: Moderate assistance, +2, Moderate verbal cues  Transfers  Transfer: Yes  Transfer 1  Transfer From 1: Sit to, Stand to  Transfer to 1: Bed  Transfer Device 1: Walker, Gait belt  Transfer Level of Assistance 1: Moderate assistance, +2, Maximum verbal cues    Ambulation/Gait Training:  Functional  Mobility  Functional Mobility Performed: Yes  Functional Mobility 1  Device 1: Rolling walker  Functional Mobility Support Devices: Gait belt  Assistance 1: Moderate assistance, Maximum verbal cues (of 2 staff)  Comments 1: few sidesteps along hospital bed    Sitting Balance:  Static Sitting Balance  Static Sitting-Level of Assistance: Contact guard    Standing Balance:  Static Standing Balance  Static Standing-Level of Assistance: Moderate assistance (of 2 staff:  poor)    Sensation:  Sensation Comment: reported right foot decrease sensation after surgery; chronic bilateral hands numbness/tingling    Extremities: RUE   RUE : Within Functional Limits and LUE   LUE: Within Functional Limits    Outcome Measures: [facility] Daily Activity  Putting on and taking off regular lower body clothing: Total  Bathing (including washing, rinsing, drying): A lot  Putting on and taking off regular upper body clothing: A little  Toileting, which includes using toilet, bedpan or urinal: Total  Taking care of personal grooming such as brushing teeth: A little  Eating Meals: None  Daily Activity - Total Score: 14      EDUCATION:   Education Documentation  Body Mechanics, taught by Juana Mata OT at 6/8/2024  3:45 PM.  Learner: Patient  Readiness: Acceptance  Method: Explanation  Response: Verbalizes Understanding, Demonstrated Understanding, Needs Reinforcement  Comment: including THR precautions; required step-by-step instructions with cues needed for all mobility tasks to promote safety    Precautions, taught by Juana Mata OT at 6/8/2024  3:45 PM.  Learner: Patient  Readiness: Acceptance  Method: Explanation  Response: Verbalizes Understanding, Demonstrated Understanding, Needs Reinforcement  Comment: including THR precautions; required step-by-step instructions with cues needed for all mobility tasks to promote safety    Goals:   Encounter Problems       Encounter Problems (Active)       OT Goals       Patient will complete  upper and lower body bathing/dressing; toileting with supervision using adaptive equipment as needed  (Progressing)       Start:  06/08/24    Expected End:  06/29/24            Patient will perform bed mobility and functional transfers safely with supervision:  bed, chair, commode using DME as needed  (Progressing)       Start:  06/08/24    Expected End:  06/29/24            Patient will tolerate standing for 5 mins. and show overall good (-) standing balance during ADL's and functional transfers/mobility  (Progressing)       Start:  06/08/24    Expected End:  06/29/24            adhere to THR precautions during ADL's and functional transfers  (Progressing)       Start:  06/08/24    Expected End:  06/29/24

## 2024-06-08 NOTE — CONSULTS
Vancomycin Dosing by Pharmacy- INITIAL    Kit Franklin is a 78 y.o. year old male who Pharmacy has been consulted for vancomycin dosing for unknown infection . Based on the patient's indication and renal status this patient will be dosed based on a goal AUC of 400-600.     Renal function is currently stable.    Visit Vitals  /61   Pulse (!) 113   Temp 37.1 °C (98.8 °F) (Temporal)   Resp 20        Lab Results   Component Value Date    CREATININE 0.77 2024    CREATININE 0.77 2024    CREATININE 0.84 2024    CREATININE 0.78 2024        Patient weight is as follows:   Vitals:    24 1530   Weight: 101 kg (222 lb)       Cultures:  No results found for the encounter in last 14 days.        No intake/output data recorded.  I/O during current shift:  No intake/output data recorded.    Temp (24hrs), Av.7 °C (99.8 °F), Min:37.1 °C (98.8 °F), Max:38.2 °C (100.8 °F)         Assessment/Plan     Patient has already been given a loading dose of 2000 mg.  Will initiate vancomycin maintenance,  1000 mg every 12 hours.    This dosing regimen is predicted by [employer] to result in the following pharmacokinetic parameters:  Regimen: 1000 mg IV every 12 hours.  Start time: 08:18 on 2024  Exposure target: AUC24 (range)400-600 mg/L.hr   AUC24,ss: 457 mg/L.hr  Probability of AUC24 > 400: 64 %  Ctrough,ss: 14.8 mg/L  Probability of Ctrough,ss > 20: 25 %  Probability of nephrotoxicity (Lodise JOE ): 10 %      Follow-up level will be ordered on  at AM labs unless clinically indicated sooner.  Will continue to monitor renal function daily while on vancomycin and order serum creatinine at least every 48 hours if not already ordered.  Follow for continued vancomycin needs, clinical response, and signs/symptoms of toxicity.       Rajendra Fu [facility]

## 2024-06-08 NOTE — PROGRESS NOTES
06/08/24 1213   Discharge Planning   Living Arrangements Spouse/significant other  (Currently at  for skilled care)   Support Systems Spouse/significant other;Children   Assistance Needed Independent and driving prior to recent total knee replacement.  Currrently at  SNF for rehab, needs some help with ADLs, using walker to ambulate.   Type of Residence Private residence   Number of Stairs to Enter Residence 1   Number of Stairs Within Residence 27   Do you have animals or pets at home? No   Home or Post Acute Services Post acute facilities (Rehab/SNF/etc)   Type of Post Acute Facility Services Skilled nursing   Patient expects to be discharged to: [facility] for rehab following total knee replacement   Does the patient need discharge transport arranged? Yes   RoundTrip coordination needed? Yes   Has discharge transport been arranged? No     This TCC met with patient and son at bedside, introduced self and explained role.  Demographic information and insurance verified.  Patient discharged yesterday from [facility] to [facility] for skilled care following total knee replacement.  Patient returned to hospital after falls x2, at facility.  Patient tried ambulating to bathroom unassisted and fell.  Patient is from home with spouse prior to recent knee surgery, independent and driving.  Denies SW needs at this time.  Patient plans to return to  at discharge.  Patient will need transportation arranged.  SW updated.  Care Transitions will continue to follow.

## 2024-06-08 NOTE — PROGRESS NOTES
Patient discharged on 6/6 to [facility]. Patient fell and is admitted back at [facility].  After speaking with the patient, he will returning to SNF for skilled care.  Return referral has been placed in [facility]Port.  Will update if new precert is needed.  Will continue to follow.    UPDATE 1100: New precert is needed.  SW requested the SNF to initiate.  Will continue to follow. Grease board updated.    KAI ANSARI LCSW

## 2024-06-08 NOTE — CARE PLAN
Problem: Skin  Goal: Prevent/manage excess moisture  Outcome: Progressing     Problem: Skin  Goal: Promote skin healing  Outcome: Progressing     Problem: Skin  Goal: Prevent/manage excess moisture  Outcome: Progressing  Goal: Promote skin healing  Outcome: Progressing  Goal: Decreased wound size/increased tissue granulation at next dressing change  Outcome: Progressing  Goal: Participates in plan/prevention/treatment measures  Outcome: Progressing  Goal: Prevent/minimize sheer/friction injuries  Outcome: Progressing  Goal: Promote/optimize nutrition  Outcome: Progressing     Problem: Pain  Goal: Takes deep breaths with improved pain control throughout the shift  Outcome: Progressing  Goal: Turns in bed with improved pain control throughout the shift  Outcome: Progressing  Goal: Walks with improved pain control throughout the shift  Outcome: Progressing  Goal: Performs ADL's with improved pain control throughout shift  Outcome: Progressing  Goal: Participates in PT with improved pain control throughout the shift  Outcome: Progressing  Goal: Free from opioid side effects throughout the shift  Outcome: Progressing  Goal: Free from acute confusion related to pain meds throughout the shift  Outcome: Progressing         The patient's goals for the shift include  Control pain, no falls    The clinical goals for the shift include control pain

## 2024-06-08 NOTE — PROGRESS NOTES
Physical Therapy    Physical Therapy Evaluation    Patient Name: Kit Franklin  MRN: [id number]  Today's Date: 6/8/2024   Time Calculation  Start Time: 1351  Stop Time: 1414  Time Calculation (min): 23 min  308/308-A    Assessment/Plan   PT Assessment  PT Assessment Results: Decreased strength, Decreased range of motion, Decreased endurance, Impaired balance, Decreased mobility, Decreased safety awareness, Impaired sensation, Orthopedic restrictions, Decreased skin integrity, Pain  Rehab Prognosis: Good  Evaluation/Treatment Tolerance: Patient limited by fatigue, Patient limited by pain  Medical Staff Made Aware: Yes  End of Session Communication: Bedside nurse  Assessment Comment: Pt presents /c above impairments and decline from functional baseline. Pt will benefit from continued PT services at mod intensity to address above and maximize functional mobility.  End of Session Patient Position: Bed, 2 rail up, Alarm on  IP OR SWING BED PT PLAN  Inpatient or Swing Bed: Inpatient  PT Plan  Treatment/Interventions: Bed mobility, Transfer training, Gait training, Balance training, Neuromuscular re-education, Strengthening, Endurance training, Therapeutic exercise, Therapeutic activity  PT Plan: Skilled PT  PT Frequency: 5 times per week  PT Discharge Recommendations: Moderate intensity level of continued care  PT - OK to Discharge: Yes    Subjective     Current Problem:  1. Sepsis (Multi)        2. Fall, initial encounter        3. General weakness        4. Tachycardia        5. Weakness generalized  CANCELED: Lower extremity venous duplex bilateral    CANCELED: Lower extremity venous duplex bilateral      6. Leg swelling  Lower extremity venous duplex bilateral    Lower extremity venous duplex bilateral    CANCELED: Lower extremity venous duplex bilateral    CANCELED: Lower extremity venous duplex bilateral        Patient Active Problem List   Diagnosis    Arthralgia of hip, right    Arthritis of right hip    Benign  essential HTN    Bilateral hearing loss    Bilateral impacted cerumen    Chronic back pain    Complaints of leg weakness    Concentration deficit    COVID-19    Diarrhea    Dysuria    Facet degeneration of lumbar region    Hip pain, right    History of claustrophobia    Hyperlipemia    Irritable bowel syndrome with diarrhea    Lipoma    Lumbar radiculopathy    Numbness    Obesity    Osteoarthritis    Pancreatic cyst (HHS-HCC)    Polyarthropathy    Psoriasis    RBBB    Right shoulder pain    Rotator cuff injury    Sacroiliitis (CMS-HCC)    Scalp lesion    Sigmoid volvulus (Multi)    Systolic murmur    Weakness of shoulder    Primary osteoarthritis of right hip    Weakness generalized    Sepsis (Multi)       General Visit Information:  General  Reason for Referral: PT eval and treat  Referred By: Ice  Past Medical History Relevant to Rehab: HTN, OA, R THR 6/4/24  Co-Treatment: OT  Co-Treatment Reason: possible two person assist, maximize functional mobility  Prior to Session Communication: Bedside nurse  Patient Position Received: Bed, 2 rail up  General Comment: Pt presents from SNF s/p 2 recent falls /c c/o RLE weakness/numbness. Pt s/p R THR 6/4/24, WBAT, posterior hip precautions. CT head & C spine, R hip xray (-) acute findings. BLE duplex: partial compressibility R femoral vein, however /c good flow. May consider repeat exam. Blood cultures pending. Pt cleared for therapy by nursing. Pt supine, agreeable.    Home Living:  Home Living  Home Living Comments: Pt and spouse in split level home. 1STE, ~12 to other living areas /c rail.    Prior Level of Function:  Prior Function Per Pt/Caregiver Report  Prior Function Comments: Prior to R THR this week pt was indep at baseline. Had access to cane and WW, but wasn't using. Pt drives, sleeps in a zero gravity chair. While at the SNF pt states he was only able to walk ~10-12' /c WW and assist. Reports 2 falls d/t RLE giving out. Required assist for daily  needs.    Precautions:  Precautions  Precautions Comment: R THR precautions, WBAT RLE, falls    Objective     Pain:  Pain Assessment  Pain Assessment: 0-10  Pain Score: 4 (pt c/o ajit heel discomfort at rest, c/o R calf pain /c R DF. Nursing notified. Reports minimal sx site pain.)    Cognition:  Cognition  Overall Cognitive Status: Within Functional Limits  Orientation Level: Oriented X4 (Pt states he was confused at times at the SNF, states he thought he was in [place]. Currently oriented, following commands appropriately.)    Pt unable to recall any THR precautions. Verbal instruction/review provided this date.     General Assessments:  General Observation  General Observation: activity orders: ambulate /c A lines: external catheter, PIV   skin integrity: R hip dressing intact, RLE edema present   Activity Tolerance  Endurance: Decreased tolerance for upright activites (reports dizziness /c ambulation, improves in sitting/supine)  Sensation  Sensation Comment: reports decreased sensation light touch R foot vs L foot  Strength  Strength Comments: LLE at least 3/5, RLE 3-/5           Dynamic Sitting Balance  Dynamic Sitting-Comments: F+  Dynamic Standing Balance  Dynamic Standing-Comments: F-    Functional Assessments:     Bed Mobility  Bed Mobility: Yes  Bed Mobility 1  Bed Mobility Comments 1: Supine>sit /c modAx2, HOB elevated, increased time and heavy BUE support. Sit>supine /c maxAx2 to maintain THR precautions.  Transfers  Transfer: Yes  Transfer 1  Trials/Comments 1: Sit<>Stand /c modAx2, bed elevated, gait belt. Safety cues for LE positioning and not pulling up on walker.  Ambulation/Gait Training  Ambulation/Gait Training Performed:  (Pt ambulates 3' EOB /c modAx2, WW. Wide THERESA, poor foot clearance ajit, downward gaze, discontinuous steps. High fall risk.)         Outcome Measures:     [facility] Basic Mobility  Turning from your back to your side while in a flat bed without using bedrails: A lot  Moving from  lying on your back to sitting on the side of a flat bed without using bedrails: A lot  Moving to and from bed to chair (including a wheelchair): A lot  Standing up from a chair using your arms (e.g. wheelchair or bedside chair): A lot  To walk in hospital room: A lot  Climbing 3-5 steps with railing: A lot  Basic Mobility - Total Score: 12              Goals:  Encounter Problems       Encounter Problems (Active)       PT Problem       STG - Pt will transition supine <> sitting with SBA (Progressing)       Start:  06/08/24    Expected End:  06/22/24            STG - Pt will transfer STS with CGA (Progressing)       Start:  06/08/24    Expected End:  06/22/24            STG - Pt will amb >/=40' using WW, WBAT RLE with SBA (Progressing)       Start:  06/08/24    Expected End:  06/22/24            STG - Pt will perform 2-3 sets of BLE therex x10, per THR protocol to maximize functional strength and independence  (Progressing)       Start:  06/08/24    Expected End:  06/22/24            STG - Pt will state and maintain 3 of 3 THR precautions /s cuing (Progressing)       Start:  06/08/24    Expected End:  06/22/24                 Education Documentation  Precautions, taught by Xiomara Vyas PT at 6/8/2024  2:45 PM.  Learner: Patient  Readiness: Acceptance  Method: Explanation  Response: Verbalizes Understanding, Needs Reinforcement    Mobility Training, taught by Xiomara Vyas PT at 6/8/2024  2:45 PM.  Learner: Patient  Readiness: Acceptance  Method: Explanation  Response: Verbalizes Understanding, Needs Reinforcement    Education Comments  No comments found.

## 2024-06-08 NOTE — H&P
"HPI:  Kit Franklin is a 78 y.o. year old male with a history of HTN, HLD and right hip arthritis s/p recent THR (on 06/04/24) after which he was discharged to SNF for further rehabilitation yesterday. Patient returned to [place] ER today due to sustaining two falls since discharge. No obvious injury or complaints. Notes generalized weakness and legs \"giving out.\"    In the ER, initial temp 38.2C,  and /86. CT head and right hip X-ray negative for acute process. Labs with Hgb 8.4 (similar to previous at discharge), potassium 3.3. UA negative. Patient given vancomycin and zosyn in the ER as well as 2 L IVF.     A 10 point ROS was performed with the patient denying any complaint at this time aside from those listed in the HPI above.     Past Medical History: as above  Past Surgical History: reviewed   Family History: noncontributory  Social History: denies current smoking, EtOH, illicit drug use.    Visit Vitals  /58   Pulse 95   Temp 36.8 °C (98.2 °F)   Resp 16      Physical Exam:   GENERAL: No apparent distress.   HEAD:  Normocephalic, atraumatic.  EYES:  Round and reactive.  ENT:  No nasal discharge, mucous membranes moist and pink.  NECK:  Atraumatic, no meningismus.  CARDIOVASCULAR:  Tachycardic, no murmur.   RESPIRATORY:  CTAB, no active work of breathing.   ABDOMEN:  Soft, no tenderness, nondistended.  EXTREMITIES: No calf tenderness.  SKIN:  Warm and dry.  NEUROLOGICAL:  Answering questions appropriately.     Lab Results   Component Value Date    WBC 7.4 06/08/2024    HGB 7.8 (L) 06/08/2024    HCT 23.4 (L) 06/08/2024    MCV 96 06/08/2024     (L) 06/08/2024      Lab Results   Component Value Date    GLUCOSE 101 (H) 06/08/2024    CALCIUM 8.4 (L) 06/08/2024     06/08/2024    K 3.4 (L) 06/08/2024    CO2 24 06/08/2024     06/08/2024    BUN 10 06/08/2024    CREATININE 0.69 06/08/2024      Medications:  aspirin, 325 mg, oral, BID  cholecalciferol, 1,000 Units, oral, Daily  docusate " sodium, 100 mg, oral, Daily  melatonin, 3 mg, oral, Nightly  metoprolol succinate XL, 50 mg, oral, Daily  piperacillin-tazobactam, 3.375 g, intravenous, q6h  simvastatin, 20 mg, oral, Daily  tamsulosin, 0.4 mg, oral, Daily  vancomycin, 1 g, intravenous, q12h       Assessment/ Plan:  Kit Franklin is a 78 y.o. year old male with a history of HTN, HLD and right hip arthritis s/p recent THR (on 06/04/24) presented to [facility] for recurrent falls.    #SIRS   #Ambulatory dysfunction  -Obtain blood cx.  CXR and UA negative.   -Empiric vancomycin and zosyn for now. Follow up infectious workup and adjust as appropriate. No evidence of hardware failure on imaging. Continue to monitor and consider Ortho consult if needed.  -Obtain LE duplex considering recent surgery.  -Cont  mg BID per previous Ortho discharge recommendations.  -Will obtain formal EKG and resume home Toprol accordingly as below.     #HTN  #HLD  #BPH  -Cont home Toprol, Simvastatin and Flomax.     Diet: regular   DVT ppx:  mg BID     No fevers, HR better, unclear source of confusion, low suspicion fo sepsis will imagine leg given cramps

## 2024-06-09 LAB
HOLD SPECIMEN: NORMAL
HOLD SPECIMEN: NORMAL
MRSA DNA SPEC QL NAA+PROBE: NOT DETECTED
VANCOMYCIN SERPL-MCNC: 8 UG/ML (ref 5–20)

## 2024-06-09 PROCEDURE — 1200000002 HC GENERAL ROOM WITH TELEMETRY DAILY

## 2024-06-09 PROCEDURE — 2500000004 HC RX 250 GENERAL PHARMACY W/ HCPCS (ALT 636 FOR OP/ED): Performed by: STUDENT IN AN ORGANIZED HEALTH CARE EDUCATION/TRAINING PROGRAM

## 2024-06-09 PROCEDURE — 97110 THERAPEUTIC EXERCISES: CPT | Mod: GP,CQ

## 2024-06-09 PROCEDURE — 2500000001 HC RX 250 WO HCPCS SELF ADMINISTERED DRUGS (ALT 637 FOR MEDICARE OP): Performed by: STUDENT IN AN ORGANIZED HEALTH CARE EDUCATION/TRAINING PROGRAM

## 2024-06-09 PROCEDURE — 87641 MR-STAPH DNA AMP PROBE: CPT | Performed by: STUDENT IN AN ORGANIZED HEALTH CARE EDUCATION/TRAINING PROGRAM

## 2024-06-09 PROCEDURE — 99232 SBSQ HOSP IP/OBS MODERATE 35: CPT | Performed by: STUDENT IN AN ORGANIZED HEALTH CARE EDUCATION/TRAINING PROGRAM

## 2024-06-09 PROCEDURE — 36415 COLL VENOUS BLD VENIPUNCTURE: CPT | Performed by: STUDENT IN AN ORGANIZED HEALTH CARE EDUCATION/TRAINING PROGRAM

## 2024-06-09 PROCEDURE — 80202 ASSAY OF VANCOMYCIN: CPT | Performed by: STUDENT IN AN ORGANIZED HEALTH CARE EDUCATION/TRAINING PROGRAM

## 2024-06-09 PROCEDURE — 97116 GAIT TRAINING THERAPY: CPT | Mod: GP,CQ

## 2024-06-09 PROCEDURE — 2500000002 HC RX 250 W HCPCS SELF ADMINISTERED DRUGS (ALT 637 FOR MEDICARE OP, ALT 636 FOR OP/ED): Mod: MUE | Performed by: STUDENT IN AN ORGANIZED HEALTH CARE EDUCATION/TRAINING PROGRAM

## 2024-06-09 RX ORDER — SIMETHICONE 80 MG
40 TABLET,CHEWABLE ORAL 4 TIMES DAILY PRN
Status: DISCONTINUED | OUTPATIENT
Start: 2024-06-09 | End: 2024-06-10 | Stop reason: HOSPADM

## 2024-06-09 RX ORDER — MORPHINE SULFATE 2 MG/ML
2 INJECTION, SOLUTION INTRAMUSCULAR; INTRAVENOUS ONCE
Status: COMPLETED | OUTPATIENT
Start: 2024-06-09 | End: 2024-06-09

## 2024-06-09 RX ORDER — KETOROLAC TROMETHAMINE 30 MG/ML
15 INJECTION, SOLUTION INTRAMUSCULAR; INTRAVENOUS EVERY 8 HOURS PRN
Status: DISCONTINUED | OUTPATIENT
Start: 2024-06-09 | End: 2024-06-10 | Stop reason: HOSPADM

## 2024-06-09 RX ADMIN — SIMVASTATIN 20 MG: 20 TABLET, FILM COATED ORAL at 20:48

## 2024-06-09 RX ADMIN — TRAMADOL HYDROCHLORIDE 50 MG: 50 TABLET, COATED ORAL at 18:51

## 2024-06-09 RX ADMIN — DOCUSATE SODIUM 100 MG: 100 CAPSULE, LIQUID FILLED ORAL at 09:38

## 2024-06-09 RX ADMIN — KETOROLAC TROMETHAMINE 15 MG: 30 INJECTION, SOLUTION INTRAMUSCULAR; INTRAVENOUS at 20:48

## 2024-06-09 RX ADMIN — Medication 1000 UNITS: at 09:39

## 2024-06-09 RX ADMIN — ACETAMINOPHEN 650 MG: 325 TABLET ORAL at 14:11

## 2024-06-09 RX ADMIN — METOPROLOL SUCCINATE 50 MG: 50 TABLET, EXTENDED RELEASE ORAL at 09:39

## 2024-06-09 RX ADMIN — Medication 3 MG: at 20:48

## 2024-06-09 RX ADMIN — PIPERACILLIN SODIUM AND TAZOBACTAM SODIUM 3.38 G: 3; .375 INJECTION, SOLUTION INTRAVENOUS at 06:05

## 2024-06-09 RX ADMIN — SIMETHICONE 40 MG: 80 TABLET, CHEWABLE ORAL at 13:08

## 2024-06-09 RX ADMIN — MORPHINE SULFATE 2 MG: 2 INJECTION, SOLUTION INTRAMUSCULAR; INTRAVENOUS at 22:29

## 2024-06-09 RX ADMIN — ASPIRIN 325 MG: 325 TABLET, COATED ORAL at 20:48

## 2024-06-09 RX ADMIN — PIPERACILLIN SODIUM AND TAZOBACTAM SODIUM 3.38 G: 3; .375 INJECTION, SOLUTION INTRAVENOUS at 00:27

## 2024-06-09 RX ADMIN — ASPIRIN 325 MG: 325 TABLET, COATED ORAL at 09:38

## 2024-06-09 RX ADMIN — TAMSULOSIN HYDROCHLORIDE 0.4 MG: 0.4 CAPSULE ORAL at 09:38

## 2024-06-09 ASSESSMENT — PAIN DESCRIPTION - DESCRIPTORS
DESCRIPTORS: ACHING;SORE;SHOOTING;SHARP
DESCRIPTORS: ACHING
DESCRIPTORS: ACHING;DULL
DESCRIPTORS: ACHING

## 2024-06-09 ASSESSMENT — PAIN - FUNCTIONAL ASSESSMENT
PAIN_FUNCTIONAL_ASSESSMENT: 0-10

## 2024-06-09 ASSESSMENT — COGNITIVE AND FUNCTIONAL STATUS - GENERAL
DRESSING REGULAR UPPER BODY CLOTHING: A LITTLE
WALKING IN HOSPITAL ROOM: A LOT
DRESSING REGULAR LOWER BODY CLOTHING: A LOT
MOVING TO AND FROM BED TO CHAIR: A LOT
WALKING IN HOSPITAL ROOM: A LOT
TURNING FROM BACK TO SIDE WHILE IN FLAT BAD: A LOT
MOVING FROM LYING ON BACK TO SITTING ON SIDE OF FLAT BED WITH BEDRAILS: A LOT
MOBILITY SCORE: 12
MOVING TO AND FROM BED TO CHAIR: A LOT
TURNING FROM BACK TO SIDE WHILE IN FLAT BAD: A LOT
TOILETING: TOTAL
HELP NEEDED FOR BATHING: A LOT
CLIMB 3 TO 5 STEPS WITH RAILING: TOTAL
MOVING FROM LYING ON BACK TO SITTING ON SIDE OF FLAT BED WITH BEDRAILS: A LOT
CLIMB 3 TO 5 STEPS WITH RAILING: A LOT
STANDING UP FROM CHAIR USING ARMS: A LOT
MOBILITY SCORE: 11
DAILY ACTIVITIY SCORE: 16
STANDING UP FROM CHAIR USING ARMS: A LOT

## 2024-06-09 ASSESSMENT — PAIN SCALES - GENERAL
PAINLEVEL_OUTOF10: 8
PAINLEVEL_OUTOF10: 0 - NO PAIN
PAINLEVEL_OUTOF10: 8
PAINLEVEL_OUTOF10: 8
PAINLEVEL_OUTOF10: 7
PAINLEVEL_OUTOF10: 5 - MODERATE PAIN
PAINLEVEL_OUTOF10: 6
PAINLEVEL_OUTOF10: 6

## 2024-06-09 ASSESSMENT — PAIN DESCRIPTION - LOCATION: LOCATION: FOOT

## 2024-06-09 ASSESSMENT — PAIN DESCRIPTION - ORIENTATION: ORIENTATION: RIGHT

## 2024-06-09 NOTE — CARE PLAN
The patient's goals for the shift include  safety    The clinical goals for the shift include patient will remain safe and free of injury    Over the shift, the patient did not make progress toward the following goals. Barriers to progression include recent right total hip replacement. Recommendations to address these barriers include continue to monitor.

## 2024-06-09 NOTE — PROGRESS NOTES
Physical Therapy    Physical Therapy Treatment    Patient Name: iKt Franklin  MRN: [id number]  Today's Date: 6/9/2024  Time Calculation  Start Time: 1020  Stop Time: 1059  Time Calculation (min): 39 min    Assessment/Plan   PT Assessment  End of Session Communication: PCT/NA/CTA  End of Session Patient Position: Up in chair, Alarm on     PT Plan  Treatment/Interventions: Bed mobility, Transfer training, Gait training, Balance training, Neuromuscular re-education, Strengthening, Endurance training, Therapeutic exercise, Therapeutic activity  PT Plan: Skilled PT  PT Frequency: 5 times per week  PT Discharge Recommendations: Moderate intensity level of continued care  PT - OK to Discharge: Yes      General Visit Information:   PT  Visit  PT Received On: 06/09/24  General  Prior to Session Communication: Bedside nurse, PCT/KRISTIE/ARIS  Patient Position Received: Bed, 2 rail up  General Comment:  (pt very pleasant; agreeable to participate in therapy session)    Subjective   Precautions:  Precautions  LE Weight Bearing Status:  (RLE WBAT)  Medical Precautions: Fall precautions  Post-Surgical Precautions: Right hip precautions       Objective   Pain:  Pain Assessment  Pain Assessment: 0-10  Pain Score:  (4.5 pre-tx;  5 post-tx)  Pain Location:  (R hip)  Pain Interventions:  (pt declining any pain medication or cold pack at this time)     Treatments:  Therapeutic Exercise  Therapeutic Exercise Performed:  (supine RLE AP, GS, QS, SAQ, HS, and hip AB x 20 reps each.  intermittent assist as needed provided for HS and AB exercises)    Bed Mobility  Bed Mobility:  (sup > sit with mod A x 1)    Ambulation/Gait Training  Ambulation/Gait Training Performed:  (pt ambulates bed to chair followed by additional ~6 ft forward/retro after seated rest break.  pt also completes x2 trials static stance as well as pre-gait weight shifts to facilitate increased WB through RLE.  FWW and min A x 2 progressing to min A x 1.  cuing for sequencing  throughout.)    Transfers  Transfer:  (sit <> stand x 2 trials with FWW.  min A x 2 from bed;  min A x 1 from recliner chair)    Outcome Measures:  [facility] Basic Mobility  Turning from your back to your side while in a flat bed without using bedrails: A lot  Moving from lying on your back to sitting on the side of a flat bed without using bedrails: A lot  Moving to and from bed to chair (including a wheelchair): A lot  Standing up from a chair using your arms (e.g. wheelchair or bedside chair): A lot  To walk in hospital room: A lot  Climbing 3-5 steps with railing: A lot  Basic Mobility - Total Score: 12    Education Documentation  Home Exercise Program, taught by Mary Heath PTA at 6/9/2024  1:23 PM.  Learner: Patient  Readiness: Acceptance  Method: Explanation  Response: Verbalizes Understanding    Precautions, taught by Mary Heath PTA at 6/9/2024  1:23 PM.  Learner: Patient  Readiness: Acceptance  Method: Explanation  Response: Verbalizes Understanding    Mobility Training, taught by Mary Heath PTA at 6/9/2024  1:23 PM.  Learner: Patient  Readiness: Acceptance  Method: Explanation  Response: Verbalizes Understanding    Education Comments  No comments found.        EDUCATION:       Encounter Problems       Encounter Problems (Active)       PT Problem       STG - Pt will transition supine <> sitting with SBA (Progressing)       Start:  06/08/24    Expected End:  06/22/24            STG - Pt will transfer STS with CGA (Progressing)       Start:  06/08/24    Expected End:  06/22/24            STG - Pt will amb >/=40' using WW, WBAT RLE with SBA (Progressing)       Start:  06/08/24    Expected End:  06/22/24            STG - Pt will perform 2-3 sets of BLE therex x10, per THR protocol to maximize functional strength and independence  (Progressing)       Start:  06/08/24    Expected End:  06/22/24            STG - Pt will state and maintain 3 of 3 THR precautions /s cuing (Progressing)       Start:   06/08/24    Expected End:  06/22/24

## 2024-06-09 NOTE — PROGRESS NOTES
"Kit Franklin is a 78 y.o. male on day 1 of admission presenting with Weakness generalized.    Subjective   Ct pending, son at [facility] no issues       Objective     Physical Exam  Constitutional:       Appearance: Normal appearance.   HENT:      Head: Normocephalic and atraumatic.      Right Ear: Tympanic membrane and ear canal normal.      Left Ear: Tympanic membrane and ear canal normal.      Mouth/Throat:      Mouth: Mucous membranes are moist.      Pharynx: Oropharynx is clear.   Eyes:      Extraocular Movements: Extraocular movements intact.      Conjunctiva/sclera: Conjunctivae normal.      Pupils: Pupils are equal, round, and reactive to light.   Cardiovascular:      Rate and Rhythm: Normal rate and regular rhythm.      Pulses: Normal pulses.      Heart sounds: Normal heart sounds.   Pulmonary:      Effort: Pulmonary effort is normal.      Breath sounds: Normal breath sounds.   Abdominal:      General: Abdomen is flat. Bowel sounds are normal.      Palpations: Abdomen is soft.   Musculoskeletal:         General: Normal range of motion.      Cervical back: Normal range of motion and neck supple.   Skin:     General: Skin is warm and dry.      Capillary Refill: Capillary refill takes 2 to 3 seconds.   Neurological:      General: No focal deficit present.      Mental Status: He is alert and oriented to person, place, and time. Mental status is at baseline.   Psychiatric:         Mood and Affect: Mood normal.         Behavior: Behavior normal.         Thought Content: Thought content normal.         Judgment: Judgment normal.         Last Recorded Vitals  Blood pressure 144/72, pulse 97, temperature 36.3 °C (97.3 °F), temperature source Temporal, resp. rate 18, height 1.727 m (5' 8\"), weight 101 kg (222 lb), SpO2 97%.  Intake/Output last 3 Shifts:  I/O last 3 completed shifts:  In: 1849 (18.4 mL/kg) [IV Piggyback:1849]  Out: 3650 (36.2 mL/kg) [Urine:3650 (1 mL/kg/hr)]  Weight: 100.7 kg     Relevant Results         "                     Assessment/Plan   Principal Problem:    Weakness generalized  Active Problems:    Sepsis (Multi)    #SIRS   #Ambulatory dysfunction  -Obtain blood cx.  CXR and UA negative.   -Empiric vancomycin and zosyn for now. Follow up infectious workup and adjust as appropriate. No evidence of hardware failure on imaging. Continue to monitor and consider Ortho consult if needed.  -Obtain LE duplex considering recent surgery.  -Cont  mg BID per previous Ortho discharge recommendations.  -Will obtain formal EKG and resume home Toprol accordingly as below.      #HTN  #HLD  #BPH  -Cont home Toprol, Simvastatin and Flomax.      Diet: regular   DVT ppx:  mg BID      No fevers, HR better, unclear source of confusion, low suspicion fo sepsis will imagine leg given cramp    6/9: fevers free, tachy better, needs precvert       I spent 35 minutes in the professional and overall care of this patient.      Morro Merrill, DO

## 2024-06-09 NOTE — PROGRESS NOTES
Vancomycin Dosing by Pharmacy- Cessation of Therapy    Consult to pharmacy for vancomycin dosing has been discontinued by the prescriber, pharmacy will sign off at this time.    Please call pharmacy if there are further questions or re-enter a consult if vancomycin is resumed.     Rose Yepez, PharmD

## 2024-06-09 NOTE — PROGRESS NOTES
Vancomycin Dosing by Pharmacy- FOLLOW UP    Kit Franklin is a 78 y.o. year old male who Pharmacy has been consulted for vancomycin dosing for other unknown infection . Based on the patient's indication and renal status this patient is being dosed based on a goal AUC of 400-600.     Renal function is currently stable.  Crcl = 103 ml/min    Current vancomycin dose: 1000 mg given every 12 hours    Estimated vancomycin AUC on current dose: 457 mg/L.hr     Visit Vitals  /72 (Patient Position: Lying)   Pulse 97   Temp 36.3 °C (97.3 °F) (Temporal)   Resp 18        Lab Results   Component Value Date    CREATININE 0.69 2024    CREATININE 0.77 2024    CREATININE 0.77 2024    CREATININE 0.84 2024        Patient weight is as follows:   Vitals:    24 1530   Weight: 101 kg (222 lb)       Cultures:  No results found for the encounter in last 14 days.       I/O last 3 completed shifts:  In: 1849 (18.4 mL/kg) [IV Piggyback:1849]  Out: 3650 (36.2 mL/kg) [Urine:3650 (1 mL/kg/hr)]  Weight: 100.7 kg   I/O during current shift:  No intake/output data recorded.    Temp (24hrs), Av.7 °C (98 °F), Min:36.3 °C (97.3 °F), Max:37.5 °C (99.5 °F)      Assessment/Plan    Below goal AUC. Orders placed for new vancomcyin regimen of 1250 every 8 hours to begin at 1200 today.     This dosing regimen is predicted by [employer] to result in the following pharmacokinetic parameters:    Regimen: 1250 mg IV every 8 hours.  Start time: 10:09 on 2024  Exposure target: AUC24 (range)400-600 mg/L.hr   AUC24,ss: 533 mg/L.hr  Probability of AUC24 > 400: 96 %  Ctrough,ss: 15.5 mg/L  Probability of Ctrough,ss > 20: 12 %  Probability of nephrotoxicity (Lodise JOE ): 11 %    The next level will be obtained on 6/10 at 1000. May be obtained sooner if clinically indicated.   Will continue to monitor renal function daily while on vancomycin and order serum creatinine at least every 48 hours if not already ordered.  Follow  for continued vancomycin needs, clinical response, and signs/symptoms of toxicity.       Sho Post, PharmD

## 2024-06-10 VITALS
SYSTOLIC BLOOD PRESSURE: 122 MMHG | OXYGEN SATURATION: 96 % | WEIGHT: 222 LBS | RESPIRATION RATE: 18 BRPM | HEIGHT: 68 IN | HEART RATE: 108 BPM | TEMPERATURE: 98.6 F | DIASTOLIC BLOOD PRESSURE: 60 MMHG | BODY MASS INDEX: 33.65 KG/M2

## 2024-06-10 LAB
ALBUMIN SERPL BCP-MCNC: 3.2 G/DL (ref 3.4–5)
ALP SERPL-CCNC: 84 U/L (ref 33–136)
ALT SERPL W P-5'-P-CCNC: 48 U/L (ref 10–52)
ANION GAP SERPL CALC-SCNC: 10 MMOL/L (ref 10–20)
AST SERPL W P-5'-P-CCNC: 57 U/L (ref 9–39)
BILIRUB SERPL-MCNC: 0.6 MG/DL (ref 0–1.2)
BUN SERPL-MCNC: 16 MG/DL (ref 6–23)
CALCIUM SERPL-MCNC: 8.4 MG/DL (ref 8.6–10.3)
CHLORIDE SERPL-SCNC: 104 MMOL/L (ref 98–107)
CO2 SERPL-SCNC: 29 MMOL/L (ref 21–32)
CREAT SERPL-MCNC: 0.66 MG/DL (ref 0.5–1.3)
EGFRCR SERPLBLD CKD-EPI 2021: >90 ML/MIN/1.73M*2
GLUCOSE SERPL-MCNC: 108 MG/DL (ref 74–99)
HOLD SPECIMEN: NORMAL
POTASSIUM SERPL-SCNC: 3 MMOL/L (ref 3.5–5.3)
PROT SERPL-MCNC: 5.6 G/DL (ref 6.4–8.2)
SODIUM SERPL-SCNC: 140 MMOL/L (ref 136–145)

## 2024-06-10 PROCEDURE — 99238 HOSP IP/OBS DSCHRG MGMT 30/<: CPT | Performed by: STUDENT IN AN ORGANIZED HEALTH CARE EDUCATION/TRAINING PROGRAM

## 2024-06-10 PROCEDURE — 97535 SELF CARE MNGMENT TRAINING: CPT | Mod: CO,GO

## 2024-06-10 PROCEDURE — 2500000002 HC RX 250 W HCPCS SELF ADMINISTERED DRUGS (ALT 637 FOR MEDICARE OP, ALT 636 FOR OP/ED): Mod: MUE | Performed by: STUDENT IN AN ORGANIZED HEALTH CARE EDUCATION/TRAINING PROGRAM

## 2024-06-10 PROCEDURE — 2500000001 HC RX 250 WO HCPCS SELF ADMINISTERED DRUGS (ALT 637 FOR MEDICARE OP): Performed by: REGISTERED NURSE

## 2024-06-10 PROCEDURE — 2500000002 HC RX 250 W HCPCS SELF ADMINISTERED DRUGS (ALT 637 FOR MEDICARE OP, ALT 636 FOR OP/ED): Performed by: STUDENT IN AN ORGANIZED HEALTH CARE EDUCATION/TRAINING PROGRAM

## 2024-06-10 PROCEDURE — 97116 GAIT TRAINING THERAPY: CPT | Mod: GP,CQ

## 2024-06-10 PROCEDURE — 2500000001 HC RX 250 WO HCPCS SELF ADMINISTERED DRUGS (ALT 637 FOR MEDICARE OP): Performed by: STUDENT IN AN ORGANIZED HEALTH CARE EDUCATION/TRAINING PROGRAM

## 2024-06-10 PROCEDURE — 36415 COLL VENOUS BLD VENIPUNCTURE: CPT | Performed by: STUDENT IN AN ORGANIZED HEALTH CARE EDUCATION/TRAINING PROGRAM

## 2024-06-10 PROCEDURE — 80053 COMPREHEN METABOLIC PANEL: CPT | Performed by: STUDENT IN AN ORGANIZED HEALTH CARE EDUCATION/TRAINING PROGRAM

## 2024-06-10 RX ORDER — CYCLOBENZAPRINE HCL 10 MG
5 TABLET ORAL 3 TIMES DAILY PRN
Status: DISCONTINUED | OUTPATIENT
Start: 2024-06-10 | End: 2024-06-10 | Stop reason: HOSPADM

## 2024-06-10 RX ORDER — POTASSIUM CHLORIDE 20 MEQ/1
40 TABLET, EXTENDED RELEASE ORAL ONCE
Status: COMPLETED | OUTPATIENT
Start: 2024-06-10 | End: 2024-06-10

## 2024-06-10 RX ORDER — LOPERAMIDE HYDROCHLORIDE 2 MG/1
2 CAPSULE ORAL 4 TIMES DAILY PRN
Status: DISCONTINUED | OUTPATIENT
Start: 2024-06-10 | End: 2024-06-10 | Stop reason: HOSPADM

## 2024-06-10 RX ORDER — SIMETHICONE 80 MG
40 TABLET,CHEWABLE ORAL 4 TIMES DAILY PRN
Qty: 30 TABLET | Refills: 0 | Status: SHIPPED | OUTPATIENT
Start: 2024-06-10

## 2024-06-10 RX ORDER — CYCLOBENZAPRINE HCL 5 MG
5 TABLET ORAL 3 TIMES DAILY PRN
Start: 2024-06-10

## 2024-06-10 RX ADMIN — POTASSIUM CHLORIDE 40 MEQ: 1500 TABLET, EXTENDED RELEASE ORAL at 10:24

## 2024-06-10 RX ADMIN — METOPROLOL SUCCINATE 50 MG: 50 TABLET, EXTENDED RELEASE ORAL at 08:51

## 2024-06-10 RX ADMIN — LOPERAMIDE HYDROCHLORIDE 2 MG: 2 CAPSULE ORAL at 08:50

## 2024-06-10 RX ADMIN — ASPIRIN 325 MG: 325 TABLET, COATED ORAL at 08:51

## 2024-06-10 RX ADMIN — TAMSULOSIN HYDROCHLORIDE 0.4 MG: 0.4 CAPSULE ORAL at 08:51

## 2024-06-10 RX ADMIN — Medication 1000 UNITS: at 08:51

## 2024-06-10 RX ADMIN — CYCLOBENZAPRINE 5 MG: 10 TABLET, FILM COATED ORAL at 16:21

## 2024-06-10 RX ADMIN — LOPERAMIDE HYDROCHLORIDE 2 MG: 2 CAPSULE ORAL at 13:09

## 2024-06-10 ASSESSMENT — PAIN SCALES - GENERAL
PAINLEVEL_OUTOF10: 2

## 2024-06-10 ASSESSMENT — COGNITIVE AND FUNCTIONAL STATUS - GENERAL
DRESSING REGULAR UPPER BODY CLOTHING: A LITTLE
PERSONAL GROOMING: A LITTLE
DRESSING REGULAR LOWER BODY CLOTHING: A LOT
DAILY ACTIVITIY SCORE: 16
STANDING UP FROM CHAIR USING ARMS: A LITTLE
CLIMB 3 TO 5 STEPS WITH RAILING: A LOT
MOBILITY SCORE: 15
WALKING IN HOSPITAL ROOM: A LITTLE
TURNING FROM BACK TO SIDE WHILE IN FLAT BAD: A LOT
TOILETING: A LOT
MOVING TO AND FROM BED TO CHAIR: A LITTLE
HELP NEEDED FOR BATHING: A LOT
MOVING FROM LYING ON BACK TO SITTING ON SIDE OF FLAT BED WITH BEDRAILS: A LOT

## 2024-06-10 ASSESSMENT — ACTIVITIES OF DAILY LIVING (ADL): HOME_MANAGEMENT_TIME_ENTRY: 18

## 2024-06-10 ASSESSMENT — PAIN - FUNCTIONAL ASSESSMENT: PAIN_FUNCTIONAL_ASSESSMENT: 0-10

## 2024-06-10 NOTE — PROGRESS NOTES
Occupational Therapy    OT Treatment    Patient Name: Kit Franklin  MRN: [id number]  Today's Date: 6/10/2024  Time Calculation  Start Time: 1343  Stop Time: 1416  Time Calculation (min): 33 min         Assessment:  End of Session Communication: PCT/NA/ARIS  End of Session Patient Position: Up in chair, Alarm on     Plan:  Treatment Interventions: ADL retraining, Functional transfer training, Patient/family training, Equipment evaluation/education (THR precaution training)  OT Frequency: 5 times per week  OT Discharge Recommendations: Moderate intensity level of continued care  OT - OK to Discharge: Yes (to next level of care when medically cleared by physician/medical team)  Treatment Interventions: ADL retraining, Functional transfer training, Patient/family training, Equipment evaluation/education (THR precaution training)    Subjective   Previous Visit Info:  OT Last Visit  OT Received On: 06/10/24  General:  General  Co-Treatment: PT  Co-Treatment Reason: increased safety and maximize mobility  Prior to Session Communication: Bedside nurse  Patient Position Received:  (sitting on bedside commode next to bed)  General Comment: pleasant and cooperative  Precautions:  LE Weight Bearing Status: Weight Bearing as Tolerated  Medical Precautions: Fall precautions  Post-Surgical Precautions: Right hip precautions       Pain:  Pain Assessment  Pain Score: 2  Pain Type: Surgical pain  Pain Location:  (R hip)    Objective         Activities of Daily Living:      LE Dressing  LE Dressing Adaptive Equipment: Sock aide  Sock Level of Assistance: Minimum assistance  Compression Hose Level of Assistance: Maximum assistance  LE Dressing Where Assessed: Chair  LE Dressing Comments: pt unable to recall use of hard sock aide and instruction provided to complete    Toileting  Toileting Level of Assistance: Maximum assistance (back homa care)  Where Assessed: Bedside commode  Functional Standing Tolerance:  Time: 5:00 standing at  FWW  Bed Mobility/Transfers: Transfers  Transfer: Yes  Transfer 1  Technique 1: Sit to stand, Stand to sit  Transfer Device 1: Walker  Transfer Level of Assistance 1: Minimum assistance  Trials/Comments 1: pt completed sit to stand from chair with arms    Toilet Transfers  Toilet Transfer to: Standard bedside commode  Toilet Transfer Technique: Ambulating  Toilet Transfers: Minimal assistance    Functional Mobility:  Functional Mobility  Functional Mobility Performed: Yes  Functional Mobility 1  Device 1: Rolling walker  Assistance 1: Minimum assistance  Comments 1: pt ambulated in room and out into dining room with min vc for increased safety      Outcome Measures:[facility] Daily Activity  Putting on and taking off regular lower body clothing: A lot  Bathing (including washing, rinsing, drying): A lot  Putting on and taking off regular upper body clothing: A little  Toileting, which includes using toilet, bedpan or urinal: A lot  Taking care of personal grooming such as brushing teeth: A little  Eating Meals: None  Daily Activity - Total Score: 16        Education Documentation  Body Mechanics, taught by KATHY Stratton at 6/10/2024  3:21 PM.  Learner: Patient  Readiness: Acceptance  Method: Explanation  Response: Verbalizes Understanding, Needs Reinforcement    Precautions, taught by KATHY Stratton at 6/10/2024  3:21 PM.  Learner: Patient  Readiness: Acceptance  Method: Explanation  Response: Verbalizes Understanding, Needs Reinforcement    ADL Training, taught by KATHY Stratton at 6/10/2024  3:21 PM.  Learner: Patient  Readiness: Acceptance  Method: Explanation  Response: Verbalizes Understanding, Needs Reinforcement    Education Comments  No comments found.        OP EDUCATION:       Goals:  Encounter Problems       Encounter Problems (Active)       OT Goals       Patient will complete upper and lower body bathing/dressing; toileting with supervision using adaptive equipment as needed   (Progressing)       Start:  06/08/24    Expected End:  06/29/24            Patient will perform bed mobility and functional transfers safely with supervision:  bed, chair, commode using DME as needed  (Progressing)       Start:  06/08/24    Expected End:  06/29/24            Patient will tolerate standing for 5 mins. and show overall good (-) standing balance during ADL's and functional transfers/mobility  (Progressing)       Start:  06/08/24    Expected End:  06/29/24            adhere to THR precautions during ADL's and functional transfers  (Progressing)       Start:  06/08/24    Expected End:  06/29/24

## 2024-06-10 NOTE — CARE PLAN
The patient's goals for the shift include      The clinical goals for the shift include pt will have a decrease in pain    Over the shift, the patient did not make progress toward the following goals. Barriers to progression include pt still having pain. Recommendations to address these barriers include continue to monitor pain level.

## 2024-06-10 NOTE — DISCHARGE SUMMARY
Discharge Diagnosis  Weakness generalized    Issues Requiring Follow-Up  Weakness    Test Results Pending At Discharge  Pending Labs       Order Current Status    Blood Culture Preliminary result    Blood Culture Preliminary result            Hospital Course    Weakness generalized  Active Problems:    Sepsis (Multi)     #SIRS   #Ambulatory dysfunction  -Obtain blood cx.  CXR and UA negative.   -Empiric vancomycin and zosyn for now. Follow up infectious workup and adjust as appropriate. No evidence of hardware failure on imaging. Continue to monitor and consider Ortho consult if needed.  -Obtain LE duplex considering recent surgery.  -Cont  mg BID per previous Ortho discharge recommendations.  -Will obtain formal EKG and resume home Toprol accordingly as below.      #HTN  #HLD  #BPH  -Cont home Toprol, Simvastatin and Flomax.      Diet: regular   DVT ppx:  mg BID      No fevers, HR better, unclear source of confusion, low suspicion fo sepsis will imagine leg given cramp     6/9: fevers free, tachy better, needs precvert    6/10: doing well apmac 12 needs precert can go back snf once get snf    Pertinent Physical Exam At Time of Discharge  Physical Exam  Constitutional:       Appearance: Normal appearance.   HENT:      Head: Normocephalic and atraumatic.      Right Ear: Tympanic membrane and ear canal normal.      Left Ear: Tympanic membrane and ear canal normal.      Mouth/Throat:      Mouth: Mucous membranes are moist.      Pharynx: Oropharynx is clear.   Eyes:      Extraocular Movements: Extraocular movements intact.      Conjunctiva/sclera: Conjunctivae normal.      Pupils: Pupils are equal, round, and reactive to light.   Cardiovascular:      Rate and Rhythm: Normal rate and regular rhythm.      Pulses: Normal pulses.      Heart sounds: Normal heart sounds.   Pulmonary:      Effort: Pulmonary effort is normal.      Breath sounds: Normal breath sounds.   Abdominal:      General: Abdomen is flat.  Bowel sounds are normal.      Palpations: Abdomen is soft.   Musculoskeletal:         General: Normal range of motion.      Cervical back: Normal range of motion and neck supple.   Skin:     General: Skin is warm and dry.      Capillary Refill: Capillary refill takes 2 to 3 seconds.   Neurological:      General: No focal deficit present.      Mental Status: He is alert and oriented to person, place, and time. Mental status is at baseline.   Psychiatric:         Mood and Affect: Mood normal.         Behavior: Behavior normal.         Thought Content: Thought content normal.         Judgment: Judgment normal.         Home Medications     Medication List      START taking these medications     simethicone 80 mg chewable tablet; Commonly known as: Mylicon; Chew 0.5   tablets (40 mg) 4 times a day as needed for flatulence.     CHANGE how you take these medications     acetaminophen 325 mg tablet; Commonly known as: Tylenol; Take 2 tablets   (650 mg) by mouth every 6 hours.; What changed: when to take this, reasons   to take this     CONTINUE taking these medications     aspirin 325 mg EC tablet; Take 1 tablet (325 mg) by mouth 2 times a day.   bisacodyl 10 mg suppository; Commonly known as: Dulcolax   cholecalciferol 25 MCG (1000 UT) tablet; Commonly known as: Vitamin D-3   Fleet Enema 19-7 gram/118 mL enema enema; Generic drug: sodium   phosphates   magnesium hydroxide 400 mg/5 mL suspension; Commonly known as: Milk of   Magnesia   melatonin 3 mg tablet   metoprolol succinate XL 50 mg 24 hr tablet; Commonly known as:   Toprol-XL; Take 1 tablet (50 mg) by mouth once daily.   multivitamin with minerals tablet   oxyCODONE 5 mg immediate release tablet; Commonly known as: Roxicodone;   Take 1-2 tablets (5-10 mg) by mouth every 6 hours if needed for severe   pain (7 - 10).   Prevalite 4 gram packet; Generic drug: cholestyramine-aspartame; Take 1   packet (4 g) by mouth 2 times a day as needed (diarrhea).   simvastatin 20 mg  tablet; Commonly known as: Zocor; Take 1 tablet (20   mg) by mouth once daily.   tamsulosin 0.4 mg 24 hr capsule; Commonly known as: Flomax   traMADol 50 mg tablet; Commonly known as: Ultram; Take 1 tablet (50 mg)   by mouth every 6 hours if needed for moderate pain (4 - 6) for up to 7   days.     STOP taking these medications     docusate sodium 100 mg capsule; Commonly known as: Colace       Outpatient Follow-Up  Future Appointments   Date Time Provider Department Center   6/18/2024  1:15 PM Frederic Galeana, PT [id number] [facility]   7/3/2024 10:00 AM Tyler Kat MD [id number] [facility]   7/16/2024  9:30 AM Elias Morley DO [id number] [facility]       Morro Merrill DO

## 2024-06-10 NOTE — PROGRESS NOTES
Spiritual Care Visit    Clinical Encounter Type  Visited With: Patient  Routine Visit:  ( responded to Spiritual Care consult order.)  Continue Visiting: No  Referral From: Other (Comment)  Patient Spiritual Care Encounters  Social Interaction: 100% of the time  Spiritual Care Visit  Clinical Encounter Type  Visited With: Patient  Routine Visit:  ( responded to Spiritual Care consult order.)  Continue Visiting: No  Referral From: Other (Comment)  Patient Spiritual Care Encounters  Social Interaction: 100% of the time  Pt discussed his health crisis. Pt reflected on his david. Pt shared about his family, children and grandchildren. Pt used agency of his voice to direct the visit. Pt used his narrative to explore his story and  created time and space and empathic listening to honor the patient. Pt's pastoral care visit was connected to medical care as this brianna integrated and collaborated with the team. Pt expressed gratitude for pastoral. Pt offered whole-person care that was senstive and connected to pt's overall wellness to his mind, body and spirit. Pt educated to reach if there are future emotional, spiritual requests. No further needs at this.  available on request.  Total time: 55 min.

## 2024-06-10 NOTE — PROGRESS NOTES
Physical Therapy    Physical Therapy Treatment    Patient Name: Kit Franklin  MRN: [id number]  Today's Date: 6/10/2024  Time Calculation  Start Time: 1343  Stop Time: 1416  Time Calculation (min): 33 min    Assessment/Plan   PT Assessment  Evaluation/Treatment Tolerance: Patient tolerated treatment well  End of Session Communication: PCT/NA/CTA  End of Session Patient Position: Up in chair, Alarm on     PT Plan  Treatment/Interventions: Bed mobility, Transfer training, Gait training, Balance training, Neuromuscular re-education, Strengthening, Endurance training, Therapeutic exercise, Therapeutic activity  PT Plan: Skilled PT  PT Frequency: 5 times per week  PT Discharge Recommendations: Moderate intensity level of continued care  PT - OK to Discharge: Yes      General Visit Information:   PT  Visit  PT Received On: 06/10/24  General  Co-Treatment: co-tx with OT to ensure safe therapeutic tx to facilitate maximum participation with skilled intervention  Prior to Session Communication: Bedside nurse  Patient Position Received:  (pt on BSC upon therapy arrival)  General Comment:  (pt remains very pleasant and agreeable to participate in therapy session)    Subjective   Precautions:  Precautions  LE Weight Bearing Status:  (RLE WBAT)  Medical Precautions: Fall precautions  Post-Surgical Precautions: Right hip precautions       Objective   Pain:  Pain Assessment  Pain Assessment: 0-10  Pain Score: 2  Pain Location:  (R hip)     Treatments:  Therapeutic Exercise  Therapeutic Exercise Performed:  (seated BLE AP, GS, and LAQ x 10-15 ea.)    Bed Mobility  Bed Mobility:  (NT)    Ambulation/Gait Training  Ambulation/Gait Training Performed:  (pt performs 1-2 min. static stance then ambulates BSC > chair.  after seated rest break pt able to ambulate additional >/=40 ft x 2.  FWW and min A x 1 plus chair follow for safety. slow pace with decreased step/stride lengths though overall improved, more fluid gait pattern with better  ability to WB RLE this date; no episodes LOB or LE instability noted.)    Transfers  Transfer:  (sit <> stand x 2 trials with FWW and min A x 1.  cuing for safe hand placement and sequencing with pt demonstrating good carryover.)    Outcome Measures:  [facility] Basic Mobility  Turning from your back to your side while in a flat bed without using bedrails: A lot  Moving from lying on your back to sitting on the side of a flat bed without using bedrails: A lot  Moving to and from bed to chair (including a wheelchair): A little  Standing up from a chair using your arms (e.g. wheelchair or bedside chair): A little  To walk in hospital room: A little  Climbing 3-5 steps with railing: A lot  Basic Mobility - Total Score: 15    Education Documentation  Home Exercise Program, taught by Mary Heath PTA at 6/10/2024  2:59 PM.  Learner: Patient  Readiness: Acceptance  Method: Explanation  Response: Verbalizes Understanding    Precautions, taught by Mary Heath PTA at 6/10/2024  2:59 PM.  Learner: Patient  Readiness: Acceptance  Method: Explanation  Response: Verbalizes Understanding    Mobility Training, taught by Mary Heath PTA at 6/10/2024  2:59 PM.  Learner: Patient  Readiness: Acceptance  Method: Explanation  Response: Verbalizes Understanding    Education Comments  No comments found.        EDUCATION:       Encounter Problems       Encounter Problems (Active)       PT Problem       STG - Pt will transition supine <> sitting with SBA (Progressing)       Start:  06/08/24    Expected End:  06/22/24            STG - Pt will transfer STS with CGA (Progressing)       Start:  06/08/24    Expected End:  06/22/24            STG - Pt will amb >/=40' using WW, WBAT RLE with SBA (Progressing)       Start:  06/08/24    Expected End:  06/22/24            STG - Pt will perform 2-3 sets of BLE therex x10, per THR protocol to maximize functional strength and independence  (Progressing)       Start:  06/08/24    Expected End:   06/22/24            STG - Pt will state and maintain 3 of 3 THR precautions /s cuing (Progressing)       Start:  06/08/24    Expected End:  06/22/24

## 2024-06-11 ENCOUNTER — APPOINTMENT (OUTPATIENT)
Dept: PRIMARY CARE | Facility: CLINIC | Age: 79
End: 2024-06-11
Payer: MEDICARE

## 2024-06-11 LAB
BACTERIA BLD CULT: NORMAL
BACTERIA BLD CULT: NORMAL

## 2024-06-11 NOTE — DOCUMENTATION CLARIFICATION NOTE
"    PATIENT:               NAHUM ERAZO  ACCT #:                  [id number]  MRN:                       [id number]  :                       1945  ADMIT DATE:       2024 3:32 PM  DISCH DATE:        6/10/2024 7:55 PM  RESPONDING PROVIDER #:        [id number]          PROVIDER RESPONSE TEXT:    Thrombocytopenia is clinically significant and required treatment/monitoring    CDI QUERY TEXT:    Clarification    Instruction:    Based on your assessment of the patient and the clinical information, please provide the requested documentation by clicking on the appropriate radio button and enter any additional information if prompted.    Question: Is there a diagnosis indicative of the lab values or image study    When answering this query, please exercise your independent professional judgment. The fact that a question is being asked, does not imply that any particular answer is desired or expected.    The patient's clinical indicators include:  Clinical Information:  77 yo admitted with generalized weakness, SIRS    Documented Diagnosis  Discharge Summary  \"Sepsis\"    H and P  \"No fevers, HR better, unclear source of confusion, low suspicion for sepsis will imagine leg given cramps\"    Clinical Indicators:  -Vital Signs 24    Temp 100.8   HR  102-124  RR  20-22    24 Platelets 107-112  24 Platelets 130    Treatment  IV Vancomycin-3 days, IV Zosyn-3 days, IV NS bolus 500 mls, IV NS bolus 1000 mls, antipyretics, lab monitoring    Risk Factors  77 yo with recent THR on 24 discharged to SNF and readmitted with generalized weakness  Options provided:  -- Thrombocytopenia is clinically significant and required treatment/monitoring  -- Low platelets not requiring treatment or evaluation  -- Other - I will add my own diagnosis  -- Refer to Clinical Documentation Reviewer    Query created by: Rabia Jones on 6/10/2024 1:40 PM      Electronically signed by:  PAULINO MIDDLETON DO 2024 2:29 PM          "

## 2024-06-11 NOTE — DOCUMENTATION CLARIFICATION NOTE
"    PATIENT:               NAHUM ERAZO  ACCT #:                  [id number]  MRN:                       [id number]  :                       1945  ADMIT DATE:       2024 3:32 PM  DISCH DATE:        6/10/2024 7:55 PM  RESPONDING PROVIDER #:        [id number]          PROVIDER RESPONSE TEXT:    Sepsis was a differential diagnosis and ruled out after study    CDI QUERY TEXT:    Clarification    Instruction:  Based on your assessment of the patient and the clinical information, please provide the requested documentation by clicking on the appropriate radio button and enter any additional information if prompted.    Question: Sepsis was documented in the medical record. Based on the documentation and the clinical information, can the diagnosis be further clarified as    When answering this query, please exercise your independent professional judgment. The fact that a question is being asked, does not imply that any particular answer is desired or expected.    The patient's clinical indicators include:  Clinical Information:  79 yo admitted with generalized weakness, SIRS    Documented Diagnosis  Discharge Summary  \"Sepsis\"    H and P  \"No fevers, HR better, unclear source of confusion, low suspicion for sepsis will imagine leg given cramps\"    Clinical Indicators:  -Vital Signs 24    Temp 100.8   HR  102-124  RR  20-22  WBC  8.9  Microbiology Results  BC no growth 2 days  Lactic acid 0..9  BUN/Creat  14/0.77  Bilirubin 0.9  [place] Coma Scale 15  Platelets 107    24 Chest Xray  \"Enlarged cardiac silhouette with pulmonary vascular congestion\"    Treatment  IV Vancomycin-3 days, IV Zosyn-3 days, IV NS bolus 500 mls, IV NS bolus 1000 mls, antipyretics, lab monitoring    Risk Factors  79 yo with recent THR on 24 discharged to SNF and readmitted with generalized weakness  Options provided:  -- Sepsis was a differential diagnosis and ruled out after study  -- Sepsis 2/2 infection of unknown origin with " hematological organ dysfunction of  - thrombocytopenia  -- Sepsis with other organ dysfunction, Please specify sepsis associated organ dysfunction below  -- Other - I will add my own diagnosis  -- Refer to Clinical Documentation Reviewer    Query created by: Rabia Jones on 6/10/2024 1:37 PM      Electronically signed by:  PAULINO MIDDLETON DO 6/11/2024 2:29 PM

## 2024-06-12 ENCOUNTER — APPOINTMENT (OUTPATIENT)
Dept: HOME HEALTH SERVICES | Facility: HOME HEALTH | Age: 79
End: 2024-06-12
Payer: MEDICARE

## 2024-06-13 LAB
LABORATORY COMMENT REPORT: NORMAL
PATH REPORT.FINAL DX SPEC: NORMAL
PATH REPORT.GROSS SPEC: NORMAL
PATH REPORT.RELEVANT HX SPEC: NORMAL
PATH REPORT.TOTAL CANCER: NORMAL

## 2024-06-18 ENCOUNTER — APPOINTMENT (OUTPATIENT)
Dept: PHYSICAL THERAPY | Facility: CLINIC | Age: 79
End: 2024-06-18
Payer: MEDICARE

## 2024-06-20 ENCOUNTER — TELEPHONE (OUTPATIENT)
Dept: PRIMARY CARE | Facility: CLINIC | Age: 79
End: 2024-06-20
Payer: MEDICARE

## 2024-06-20 NOTE — TELEPHONE ENCOUNTER
Marti from [facility] called regarding pt needing a verbal for nursing PT & OT  [phone]  Ty

## 2024-06-21 ENCOUNTER — TELEPHONE (OUTPATIENT)
Dept: PRIMARY CARE | Facility: CLINIC | Age: 79
End: 2024-06-21
Payer: MEDICARE

## 2024-06-21 NOTE — TELEPHONE ENCOUNTER
ANCELMO left vm from [facility] for verbal order for skill nursing PT and OT to start home care.      #: [phone]

## 2024-06-22 PROCEDURE — G0180 MD CERTIFICATION HHA PATIENT: HCPCS | Performed by: FAMILY MEDICINE

## 2024-06-25 ENCOUNTER — TELEPHONE (OUTPATIENT)
Dept: PRIMARY CARE | Facility: CLINIC | Age: 79
End: 2024-06-25
Payer: MEDICARE

## 2024-06-25 NOTE — TELEPHONE ENCOUNTER
Patient Kit is being seen by occupational therapy 6 times a week for total of 4 weeks.  Andie from [facility]   [phone]

## 2024-07-01 ASSESSMENT — ENCOUNTER SYMPTOMS
NECK PAIN: 0
NEUROLOGIC COMPLAINT: 1
SLURRED SPEECH: 0
BACK PAIN: 0
BOWEL INCONTINENCE: 0
DIZZINESS: 0
FOCAL SENSORY LOSS: 1
NEAR-SYNCOPE: 0
CLUMSINESS: 0
VERTIGO: 0
AURA: 0
CONFUSION: 0
VISUAL CHANGE: 0
LOSS OF BALANCE: 1
ABDOMINAL PAIN: 0
FATIGUE: 0
MEMORY LOSS: 0
ALTERED MENTAL STATUS: 0
FEVER: 0
DIAPHORESIS: 0
SHORTNESS OF BREATH: 0
PALPITATIONS: 0
NAUSEA: 0
HEADACHES: 0
FOCAL WEAKNESS: 1
VOMITING: 0
LIGHT-HEADEDNESS: 0
WEAKNESS: 1

## 2024-07-02 ENCOUNTER — OFFICE VISIT (OUTPATIENT)
Dept: PRIMARY CARE | Facility: CLINIC | Age: 79
End: 2024-07-02
Payer: MEDICARE

## 2024-07-02 VITALS
SYSTOLIC BLOOD PRESSURE: 120 MMHG | WEIGHT: 219.2 LBS | OXYGEN SATURATION: 98 % | BODY MASS INDEX: 33.33 KG/M2 | HEART RATE: 78 BPM | DIASTOLIC BLOOD PRESSURE: 68 MMHG

## 2024-07-02 DIAGNOSIS — R60.0 BILATERAL LOWER EXTREMITY EDEMA: ICD-10-CM

## 2024-07-02 DIAGNOSIS — M16.11 PRIMARY OSTEOARTHRITIS OF RIGHT HIP: ICD-10-CM

## 2024-07-02 DIAGNOSIS — D64.9 ANEMIA, UNSPECIFIED TYPE: ICD-10-CM

## 2024-07-02 DIAGNOSIS — L89.892: Primary | ICD-10-CM

## 2024-07-02 DIAGNOSIS — K86.2 PANCREATIC CYST (HHS-HCC): ICD-10-CM

## 2024-07-02 DIAGNOSIS — Z96.641 STATUS POST RIGHT HIP REPLACEMENT: ICD-10-CM

## 2024-07-02 PROBLEM — M46.1 SACROILIITIS (CMS-HCC): Status: RESOLVED | Noted: 2023-02-01 | Resolved: 2024-07-02

## 2024-07-02 PROCEDURE — 1125F AMNT PAIN NOTED PAIN PRSNT: CPT | Performed by: FAMILY MEDICINE

## 2024-07-02 PROCEDURE — 1159F MED LIST DOCD IN RCRD: CPT | Performed by: FAMILY MEDICINE

## 2024-07-02 PROCEDURE — 99215 OFFICE O/P EST HI 40 MIN: CPT | Performed by: FAMILY MEDICINE

## 2024-07-02 PROCEDURE — 3078F DIAST BP <80 MM HG: CPT | Performed by: FAMILY MEDICINE

## 2024-07-02 PROCEDURE — 1036F TOBACCO NON-USER: CPT | Performed by: FAMILY MEDICINE

## 2024-07-02 PROCEDURE — 3074F SYST BP LT 130 MM HG: CPT | Performed by: FAMILY MEDICINE

## 2024-07-02 PROCEDURE — 1111F DSCHRG MED/CURRENT MED MERGE: CPT | Performed by: FAMILY MEDICINE

## 2024-07-02 RX ORDER — FUROSEMIDE 20 MG/1
20-40 TABLET ORAL DAILY
Qty: 60 TABLET | Refills: 3 | Status: SHIPPED | OUTPATIENT
Start: 2024-07-02 | End: 2024-10-30

## 2024-07-02 RX ORDER — POTASSIUM CHLORIDE 750 MG/1
20 TABLET, FILM COATED, EXTENDED RELEASE ORAL DAILY
Qty: 60 TABLET | Refills: 3 | Status: SHIPPED | OUTPATIENT
Start: 2024-07-02 | End: 2024-10-30

## 2024-07-02 ASSESSMENT — ENCOUNTER SYMPTOMS
CONFUSION: 0
FATIGUE: 0
ABDOMINAL PAIN: 0
NEUROLOGIC COMPLAINT: 1
SHORTNESS OF BREATH: 0
BACK PAIN: 0
DEPRESSION: 0
NEAR-SYNCOPE: 0
FOCAL WEAKNESS: 1
HEADACHES: 0
DIAPHORESIS: 0
ALTERED MENTAL STATUS: 0
BOWEL INCONTINENCE: 0
NECK PAIN: 0
DIZZINESS: 0
FEVER: 0
VERTIGO: 0
MEMORY LOSS: 0
FOCAL SENSORY LOSS: 1
SLURRED SPEECH: 0
NAUSEA: 0
CLUMSINESS: 0
LIGHT-HEADEDNESS: 0
AURA: 0
PALPITATIONS: 0
VOMITING: 0
WEAKNESS: 1
VISUAL CHANGE: 0
LOSS OF BALANCE: 1

## 2024-07-02 ASSESSMENT — PAIN SCALES - GENERAL: PAINLEVEL: 4

## 2024-07-02 NOTE — PROGRESS NOTES
Subjective   Patient ID: Kit Franklin is a 78 y.o. male who presents for Follow-up (Patient is here for a rehab follow up from right hip replacement. ).    Acute Neurological Problem  The patient's primary symptoms include focal sensory loss, focal weakness, a loss of balance and weakness. The patient's pertinent negatives include no altered mental status, clumsiness, memory loss, near-syncope, slurred speech, syncope or visual change. This is a new problem. The current episode started 1 to 4 weeks ago. The neurological problem developed suddenly. The problem has been waxing and waning since onset. There was right-sided and lower extremity focality noted. Pertinent negatives include no abdominal pain, auditory change, aura, back pain, bladder incontinence, bowel incontinence, chest pain, confusion, diaphoresis, dizziness, fatigue, fever, headaches, light-headedness, nausea, neck pain, palpitations, shortness of breath, vertigo or vomiting. Past treatments include acetaminophen and bed rest. The treatment provided no relief.      Patient here for follow-up after hospitalization and rehab.  Patient had a right total hip replacement.  He unfortunately took a fall and was readmitted.  Ultimately improved and was sent to rehab.  He is progressing but very slowly.  He is eating and drinking well.  Unfortunately has developed a large blister on his right heel.  This is causing quite a bit of pain.  Pain is only a 1 or 2 most of the time but occasionally he gets some pretty sharp pains.  Patient is also struggling to sleep he wakes throughout the night.  He feels it is due to the heel hurting at night.  Legs are quite edematous.  Patient sleeps in a 0 gravity chair.  His legs are even with his heart  Review of Systems   Constitutional:  Negative for diaphoresis, fatigue and fever.   Respiratory:  Negative for shortness of breath.    Cardiovascular:  Negative for chest pain, palpitations and near-syncope.    Gastrointestinal:  Negative for abdominal pain, bowel incontinence, nausea and vomiting.   Genitourinary:  Negative for bladder incontinence.   Musculoskeletal:  Negative for back pain and neck pain.   Neurological:  Positive for focal weakness, weakness and loss of balance. Negative for dizziness, vertigo, syncope, light-headedness and headaches.   Psychiatric/Behavioral:  Negative for confusion and memory loss.        Objective   /68 (BP Location: Left arm, Patient Position: Sitting, BP Cuff Size: Adult)   Pulse 78   Wt 99.4 kg (219 lb 3.2 oz)   SpO2 98%   BMI 33.33 kg/m²     Physical Exam  Alert, pleasant and in no acute distress.  Heart: Regular rate and rhythm with  murmur  Lungs: Clear to auscultation//  Lower extremities: 3+ edema.  Right heel: 2 inch round purple-red blister.  No surrounding erythema.  Assessment/Plan   Problem List Items Addressed This Visit             ICD-10-CM    Primary osteoarthritis of right hip M16.11     Other Visit Diagnoses         Codes    Pressure injury of right foot, stage 2 (Multi)    -  Primary L89.892    Relevant Orders    Referral to Wound Clinic    Bilateral lower extremity edema     R60.0    Relevant Medications    furosemide (Lasix) 20 mg tablet    potassium chloride CR (Klor-Con) 10 mEq ER tablet    Status post right hip replacement     Z96.641        Patient here for follow-up after hip replacement.  He has had quite a bit of struggles getting better.  He has significant edema for which we are treating with furosemide and potassium.  Will see him back in 10 days to recheck this.  Pressure ulcer of right heel.  Will refer to wound clinic.  Right hip replacement: Right hip replacement undertaken for osteoarthritis of the hip.  The hip seems to be healing well.  He still has quite a bit of weakness in this hip.  He is working on his therapy.  He does have a neurologic appointment in 2 weeks.  Anemia: Persisting postop anemia.  Last CBC showed slight  improvement.  Will recheck at follow-up.  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

## 2024-07-03 ENCOUNTER — HOSPITAL ENCOUNTER (OUTPATIENT)
Dept: RADIOLOGY | Facility: CLINIC | Age: 79
Discharge: HOME | End: 2024-07-03
Payer: MEDICARE

## 2024-07-03 ENCOUNTER — APPOINTMENT (OUTPATIENT)
Dept: ORTHOPEDIC SURGERY | Facility: CLINIC | Age: 79
End: 2024-07-03
Payer: MEDICARE

## 2024-07-03 VITALS — WEIGHT: 220 LBS | HEIGHT: 68 IN | BODY MASS INDEX: 33.34 KG/M2

## 2024-07-03 DIAGNOSIS — M16.11 ARTHRITIS OF RIGHT HIP: ICD-10-CM

## 2024-07-03 DIAGNOSIS — M54.16 LUMBAR RADICULOPATHY: ICD-10-CM

## 2024-07-03 DIAGNOSIS — M16.11 ARTHRITIS OF RIGHT HIP: Primary | ICD-10-CM

## 2024-07-03 PROCEDURE — 1036F TOBACCO NON-USER: CPT | Performed by: ORTHOPAEDIC SURGERY

## 2024-07-03 PROCEDURE — 99024 POSTOP FOLLOW-UP VISIT: CPT | Performed by: ORTHOPAEDIC SURGERY

## 2024-07-03 PROCEDURE — 1111F DSCHRG MED/CURRENT MED MERGE: CPT | Performed by: ORTHOPAEDIC SURGERY

## 2024-07-03 PROCEDURE — 73502 X-RAY EXAM HIP UNI 2-3 VIEWS: CPT | Mod: RT

## 2024-07-03 PROCEDURE — 1159F MED LIST DOCD IN RCRD: CPT | Performed by: ORTHOPAEDIC SURGERY

## 2024-07-03 NOTE — PROGRESS NOTES
Subjective    Patient ID: Kit Franklin is a 78 y.o. male.    Chief Complaint: Post-op of the Right Hip (POV R CUCA DOS 6/4/24)     Last Surgery: Right Total Hip Replacement - Right  Last Surgery Date: 6/4/2024    HPI  Patient comes in for his first postoperative visit after undergoing a right total hip replacement approximately 1 month ago.  He has no further groin pain.  His biggest issue is what is likely pain from lumbar radiculopathy.  He has known lumbar spine problems which was likely exacerbated from the positioning during the procedure.  He will have numbness radiate from his lumbar spine to his right foot.    Objective   Ortho Exam  Patient is in no acute distress.  He is walking with the assistance of a walker because of his low back issues and not because a hip pain.  He has no complaints of pain with hip flexion or rotation.  He does have appropriate strength with plantarflexion and dorsiflexion of his right foot and ankle.    Image Results:  X-rays of his right hip were personally reviewed today.  They show adequate lamina of the prosthesis.  There is no evidence of any fracture or dislocation.  There is no evidence of loosening.    Assessment/Plan   Encounter Diagnoses:  Arthritis of right hip    Lumbar radiculopathy    Orders Placed This Encounter    XR hip right with pelvis when performed 2 or 3 views    EMG & nerve conduction     The patient is doing stable orthopedically from his right total hip replacement.  However I would have the patient undergo an EMG to evaluate for what is likely lumbar radiculopathy.  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  He also already has a neurology appointment scheduled.  He will follow-up with me in 1 month with x-rays of his right hip.

## 2024-07-08 ENCOUNTER — OFFICE VISIT (OUTPATIENT)
Dept: WOUND CARE | Facility: CLINIC | Age: 79
End: 2024-07-08
Payer: MEDICARE

## 2024-07-08 DIAGNOSIS — L89.892: ICD-10-CM

## 2024-07-08 PROCEDURE — 11042 DBRDMT SUBQ TIS 1ST 20SQCM/<: CPT

## 2024-07-08 PROCEDURE — 99212 OFFICE O/P EST SF 10 MIN: CPT | Mod: 25

## 2024-07-10 ENCOUNTER — APPOINTMENT (OUTPATIENT)
Dept: PHYSICAL THERAPY | Facility: CLINIC | Age: 79
End: 2024-07-10
Payer: MEDICARE

## 2024-07-11 ENCOUNTER — APPOINTMENT (OUTPATIENT)
Dept: PRIMARY CARE | Facility: CLINIC | Age: 79
End: 2024-07-11
Payer: MEDICARE

## 2024-07-11 ENCOUNTER — LAB (OUTPATIENT)
Dept: LAB | Facility: LAB | Age: 79
End: 2024-07-11
Payer: MEDICARE

## 2024-07-11 VITALS
SYSTOLIC BLOOD PRESSURE: 138 MMHG | OXYGEN SATURATION: 97 % | BODY MASS INDEX: 32.83 KG/M2 | WEIGHT: 215.9 LBS | DIASTOLIC BLOOD PRESSURE: 76 MMHG | HEART RATE: 118 BPM

## 2024-07-11 DIAGNOSIS — R20.0 NUMBNESS: ICD-10-CM

## 2024-07-11 DIAGNOSIS — Z79.899 HIGH RISK MEDICATION USE: ICD-10-CM

## 2024-07-11 DIAGNOSIS — I10 HYPERTENSION, UNSPECIFIED TYPE: ICD-10-CM

## 2024-07-11 DIAGNOSIS — R60.0 BILATERAL LOWER EXTREMITY EDEMA: Primary | ICD-10-CM

## 2024-07-11 DIAGNOSIS — G47.00 INSOMNIA, UNSPECIFIED TYPE: ICD-10-CM

## 2024-07-11 DIAGNOSIS — R60.0 BILATERAL LOWER EXTREMITY EDEMA: ICD-10-CM

## 2024-07-11 PROCEDURE — 36415 COLL VENOUS BLD VENIPUNCTURE: CPT

## 2024-07-11 PROCEDURE — 80053 COMPREHEN METABOLIC PANEL: CPT

## 2024-07-11 PROCEDURE — 84252 ASSAY OF VITAMIN B-2: CPT

## 2024-07-11 PROCEDURE — 82746 ASSAY OF FOLIC ACID SERUM: CPT

## 2024-07-11 PROCEDURE — 82607 VITAMIN B-12: CPT

## 2024-07-11 PROCEDURE — 85025 COMPLETE CBC W/AUTO DIFF WBC: CPT

## 2024-07-11 RX ORDER — GABAPENTIN 800 MG/1
800 TABLET ORAL NIGHTLY
Qty: 90 TABLET | Refills: 1 | Status: SHIPPED | OUTPATIENT
Start: 2024-07-11 | End: 2025-01-07

## 2024-07-11 RX ORDER — GABAPENTIN 300 MG/1
300 CAPSULE ORAL 3 TIMES DAILY
COMMUNITY
End: 2024-07-11 | Stop reason: ALTCHOICE

## 2024-07-11 ASSESSMENT — ENCOUNTER SYMPTOMS: DEPRESSION: 1

## 2024-07-11 ASSESSMENT — PAIN SCALES - GENERAL: PAINLEVEL: 6

## 2024-07-11 NOTE — PROGRESS NOTES
Subjective   Patient ID: Kit Franklin is a 78 y.o. male who presents for Follow-up (One week follow up. ).    HPI   Patient here for follow-up on edema.  He is down 5 pounds and his edema is down.  He was seen by wound care for the large ulcer on his heel.  He is struggling to sleep.  He is having sharp pains in his right heel.  Review of Systems    Objective   /76 (BP Location: Left arm, Patient Position: Sitting, BP Cuff Size: Adult)   Pulse (!) 118   Wt 97.9 kg (215 lb 14.4 oz)   SpO2 97%   BMI 32.83 kg/m²     Physical Exam  Alert, pleasant and in no acute distress.  Heart: Regular rate and rhythm without murmur  Lungs: Clear to auscultation  Lower extremities: 1+ edema  Assessment/Plan   Problem List Items Addressed This Visit             ICD-10-CM    Numbness R20.0    Relevant Orders    Vitamin B12 (Completed)    Folate (Completed)    Vitamin B2, Plasma     Other Visit Diagnoses         Codes    Bilateral lower extremity edema    -  Primary R60.0    Relevant Orders    CBC and Auto Differential (Completed)    Comprehensive Metabolic Panel (Completed)    Hypertension, unspecified type     I10    Relevant Orders    CBC and Auto Differential (Completed)    Comprehensive Metabolic Panel (Completed)    Insomnia, unspecified type     G47.00    Relevant Medications    gabapentin (Neurontin) 800 mg tablet    High risk medication use     Z79.899    Relevant Orders    CBC and Auto Differential (Completed)    Comprehensive Metabolic Panel (Completed)        Patient here for follow-up.  His edema is improving.  Blood pressure stable.  He is having pain and insomnia.  We are going to trial 800 mg of gabapentin as he has done well with the 300 mg dosing and tried 3 at night and did better.  We will check labs to follow-up on his postoperative anemia and to be sure his electrolytes and kidney are stable.  Will plan a follow-up in 1 month.

## 2024-07-12 LAB
ALBUMIN SERPL BCP-MCNC: 4.7 G/DL (ref 3.4–5)
ALP SERPL-CCNC: 79 U/L (ref 33–136)
ALT SERPL W P-5'-P-CCNC: 13 U/L (ref 10–52)
ANION GAP SERPL CALC-SCNC: 14 MMOL/L (ref 10–20)
AST SERPL W P-5'-P-CCNC: 14 U/L (ref 9–39)
BASOPHILS # BLD AUTO: 0.04 X10*3/UL (ref 0–0.1)
BASOPHILS NFR BLD AUTO: 0.5 %
BILIRUB SERPL-MCNC: 0.7 MG/DL (ref 0–1.2)
BUN SERPL-MCNC: 22 MG/DL (ref 6–23)
CALCIUM SERPL-MCNC: 10.1 MG/DL (ref 8.6–10.6)
CHLORIDE SERPL-SCNC: 104 MMOL/L (ref 98–107)
CO2 SERPL-SCNC: 27 MMOL/L (ref 21–32)
CREAT SERPL-MCNC: 0.78 MG/DL (ref 0.5–1.3)
EGFRCR SERPLBLD CKD-EPI 2021: >90 ML/MIN/1.73M*2
EOSINOPHIL # BLD AUTO: 0.18 X10*3/UL (ref 0–0.4)
EOSINOPHIL NFR BLD AUTO: 2.5 %
ERYTHROCYTE [DISTWIDTH] IN BLOOD BY AUTOMATED COUNT: 15.1 % (ref 11.5–14.5)
FOLATE SERPL-MCNC: 21.1 NG/ML
GLUCOSE SERPL-MCNC: 93 MG/DL (ref 74–99)
HCT VFR BLD AUTO: 36.1 % (ref 41–52)
HGB BLD-MCNC: 10.8 G/DL (ref 13.5–17.5)
IMM GRANULOCYTES # BLD AUTO: 0.03 X10*3/UL (ref 0–0.5)
IMM GRANULOCYTES NFR BLD AUTO: 0.4 % (ref 0–0.9)
LYMPHOCYTES # BLD AUTO: 2.41 X10*3/UL (ref 0.8–3)
LYMPHOCYTES NFR BLD AUTO: 32.9 %
MCH RBC QN AUTO: 29.3 PG (ref 26–34)
MCHC RBC AUTO-ENTMCNC: 29.9 G/DL (ref 32–36)
MCV RBC AUTO: 98 FL (ref 80–100)
MONOCYTES # BLD AUTO: 1.06 X10*3/UL (ref 0.05–0.8)
MONOCYTES NFR BLD AUTO: 14.5 %
NEUTROPHILS # BLD AUTO: 3.61 X10*3/UL (ref 1.6–5.5)
NEUTROPHILS NFR BLD AUTO: 49.2 %
NRBC BLD-RTO: 0 /100 WBCS (ref 0–0)
PLATELET # BLD AUTO: 184 X10*3/UL (ref 150–450)
POTASSIUM SERPL-SCNC: 4.4 MMOL/L (ref 3.5–5.3)
PROT SERPL-MCNC: 7.3 G/DL (ref 6.4–8.2)
RBC # BLD AUTO: 3.68 X10*6/UL (ref 4.5–5.9)
SODIUM SERPL-SCNC: 141 MMOL/L (ref 136–145)
VIT B12 SERPL-MCNC: 351 PG/ML (ref 211–911)
WBC # BLD AUTO: 7.3 X10*3/UL (ref 4.4–11.3)

## 2024-07-15 ENCOUNTER — OFFICE VISIT (OUTPATIENT)
Dept: WOUND CARE | Facility: CLINIC | Age: 79
End: 2024-07-15
Payer: MEDICARE

## 2024-07-15 PROCEDURE — 11042 DBRDMT SUBQ TIS 1ST 20SQCM/<: CPT

## 2024-07-16 ENCOUNTER — APPOINTMENT (OUTPATIENT)
Dept: PRIMARY CARE | Facility: CLINIC | Age: 79
End: 2024-07-16
Payer: MEDICARE

## 2024-07-17 LAB — VIT B2 SERPL-SCNC: 7 NMOL/L (ref 5–50)

## 2024-07-22 ENCOUNTER — OFFICE VISIT (OUTPATIENT)
Dept: WOUND CARE | Facility: CLINIC | Age: 79
End: 2024-07-22
Payer: MEDICARE

## 2024-07-22 PROCEDURE — 11042 DBRDMT SUBQ TIS 1ST 20SQCM/<: CPT

## 2024-07-23 ENCOUNTER — APPOINTMENT (OUTPATIENT)
Dept: PHYSICAL THERAPY | Facility: CLINIC | Age: 79
End: 2024-07-23
Payer: MEDICARE

## 2024-07-23 ENCOUNTER — APPOINTMENT (OUTPATIENT)
Dept: NEUROLOGY | Facility: CLINIC | Age: 79
End: 2024-07-23
Payer: MEDICARE

## 2024-07-23 VITALS
SYSTOLIC BLOOD PRESSURE: 138 MMHG | RESPIRATION RATE: 18 BRPM | TEMPERATURE: 98.2 F | DIASTOLIC BLOOD PRESSURE: 72 MMHG | HEART RATE: 74 BPM | HEIGHT: 68 IN | WEIGHT: 213.8 LBS | BODY MASS INDEX: 32.4 KG/M2

## 2024-07-23 DIAGNOSIS — M79.89 LEG SWELLING: ICD-10-CM

## 2024-07-23 DIAGNOSIS — G62.9 NEUROPATHY: Primary | ICD-10-CM

## 2024-07-23 PROCEDURE — 3075F SYST BP GE 130 - 139MM HG: CPT | Performed by: PSYCHIATRY & NEUROLOGY

## 2024-07-23 PROCEDURE — 3078F DIAST BP <80 MM HG: CPT | Performed by: PSYCHIATRY & NEUROLOGY

## 2024-07-23 PROCEDURE — 1160F RVW MEDS BY RX/DR IN RCRD: CPT | Performed by: PSYCHIATRY & NEUROLOGY

## 2024-07-23 PROCEDURE — 1036F TOBACCO NON-USER: CPT | Performed by: PSYCHIATRY & NEUROLOGY

## 2024-07-23 PROCEDURE — 1125F AMNT PAIN NOTED PAIN PRSNT: CPT | Performed by: PSYCHIATRY & NEUROLOGY

## 2024-07-23 PROCEDURE — 1159F MED LIST DOCD IN RCRD: CPT | Performed by: PSYCHIATRY & NEUROLOGY

## 2024-07-23 PROCEDURE — 99215 OFFICE O/P EST HI 40 MIN: CPT | Performed by: PSYCHIATRY & NEUROLOGY

## 2024-07-23 RX ORDER — GABAPENTIN 300 MG/1
CAPSULE ORAL
Qty: 30 CAPSULE | Refills: 2 | Status: SHIPPED | OUTPATIENT
Start: 2024-07-23 | End: 2024-07-23

## 2024-07-23 RX ORDER — GABAPENTIN 300 MG/1
CAPSULE ORAL
Qty: 30 CAPSULE | Refills: 2 | Status: SHIPPED | OUTPATIENT
Start: 2024-07-23

## 2024-07-23 ASSESSMENT — PAIN SCALES - GENERAL: PAINLEVEL: 4

## 2024-07-23 NOTE — PATIENT INSTRUCTIONS
"It was a pleasure seeing you today.     Agree with EMG. And repeat lower extremity ultrasound.     Please see a neuromuscular neurologist - Dr. Keene, Dr. Hunt, Dr. Pierre , Dr. Kwon, Dr. White, and Dr. Peterson are options at .     Please make a follow up appointment by calling [phone].     For any urgent issues or needing to speak to a medical assistant please call [phone], option 6 during our office hours Monday-Friday 8am-4pm, and leave a voicemail with your concern.  My office will try to reach back you as soon as possible within 24 (business) hours.  If you have an emergency please call 911 or visit a local urgent care or nearest emergency room.      Please understand that Definition 6 is a useful communication tool for simple \"normal\" results or a refill request but I would not recommend using this tool for emergent or urgent issues or for conversations with me.  I am happy to ask my staff to rearrange a follow up visit or a virtual visit sooner than requested if appropriate for your care.    "

## 2024-07-23 NOTE — PROGRESS NOTES
Subjective      Kit Franklin is a 78 y.o. year old male is here for follow up for neuralgia- here for a new issue of numbness/ pain after hip surgery.  Arrives with wife today who provides history.   HPI  Last seen 5/2022. Lost to follow up.   Had R hip surgery 7 weeks ago.  When he woke up from surgery he had severe nerve pain from below knee to the big toe. Slowly better but still pretty pain ful.  He went to rehab and is home, walking with a cane now.  He felt like a antonio horse at first.   Has a pressure wound on his right heel, not sure how long that has been there but just discovered at his surgery.  He tells me it feels like he is walking on one high heel.  Intensity is lessened slightly.    All his symptoms on right side.  He has leg edema, the edema is worse on R side but has improved since his surgery.    He has intense constant pain in his right leg distal to his knee.    He is being followed by wound care center. No antibiotics given.  No worsening back pain.   EMG was ordered and is scheduled for 2 days from now.   He has been using oxycodone as needed but does not have active prescription, is from his surgery immediately post op.   He had RLE ultrasound that was unrevealing but also a technical difficult test and recommended short term repeat.   He has lumbar radiculopathy. On gabapentin 800mg at night for sleep. Has not been on any daytime dose.  He does not know if it is helping him.     Still has anemia, improved since his surgery.     Review of Systems    Patient Active Problem List   Diagnosis    Arthralgia of hip, right    Arthritis of right hip    Benign essential HTN    Bilateral hearing loss    Bilateral impacted cerumen    Chronic back pain    Complaints of leg weakness    Concentration deficit    COVID-19    Diarrhea    Dysuria    Facet degeneration of lumbar region    Hip pain, right    History of claustrophobia    Hyperlipemia    Irritable bowel syndrome with diarrhea    Lipoma     Lumbar radiculopathy    Numbness    Obesity    Osteoarthritis    Pancreatic cyst ([facility]HS-HCC)    Polyarthropathy    Psoriasis    RBBB    Right shoulder pain    Rotator cuff injury    Scalp lesion    Sigmoid volvulus (Multi)    Systolic murmur    Weakness of shoulder    Primary osteoarthritis of right hip    Weakness generalized    Sepsis (Multi)     Past Medical History:   Diagnosis Date    Arthritis     HL (hearing loss)     Hypertension     Nephrolithiasis     Personal history of other diseases of the circulatory system 2021    History of hypertension    Personal history of other diseases of the digestive system 2021    History of pancreatitis    Personal history of other diseases of the musculoskeletal system and connective tissue 2021    History of back pain    Personal history of other diseases of the musculoskeletal system and connective tissue 2021    History of back pain    Personal history of other endocrine, nutritional and metabolic disease 2021    History of hypercholesterolemia     Past Surgical History:   Procedure Laterality Date    OTHER SURGICAL HISTORY      Cervical vertebral fusion    OTHER SURGICAL HISTORY  2019    Colectomy    OTHER SURGICAL HISTORY  2021    Colonoscopy    OTHER SURGICAL HISTORY  2021    Skin biopsy    OTHER SURGICAL HISTORY  2015 Right; 2017 Left    Total Knee Replacement    SHOULDER SURGERY Left 2013    Shoulder Surgery     Social History     Tobacco Use    Smoking status: Former     Current packs/day: 0.00     Types: Cigarettes     Quit date:      Years since quittin.5     Passive exposure: Past    Smokeless tobacco: Never   Substance Use Topics    Alcohol use: Yes     Alcohol/week: 10.0 standard drinks of alcohol     Types: 10 Cans of beer per week     Comment: moderate     family history includes Dementia in his father and mother; Diabetes in his father; Heart attack in his father; Hypertension in his father and mother;  Mitral valve prolapse in his father; Senile dementia in his father and mother.    Current Outpatient Medications:     acetaminophen (Tylenol) 325 mg tablet, Take 2 tablets (650 mg) by mouth every 6 hours., Disp: , Rfl:     cholestyramine-aspartame (Prevalite) 4 gram packet, Take 1 packet (4 g) by mouth 2 times a day as needed (diarrhea)., Disp: 60 packet, Rfl: 11    furosemide (Lasix) 20 mg tablet, Take 1-2 tablets (20-40 mg) by mouth once daily., Disp: 60 tablet, Rfl: 3    gabapentin (Neurontin) 800 mg tablet, Take 1 tablet (800 mg) by mouth once daily at bedtime., Disp: 90 tablet, Rfl: 1    metoprolol succinate XL (Toprol-XL) 50 mg 24 hr tablet, Take 1 tablet (50 mg) by mouth once daily., Disp: 90 tablet, Rfl: 3    multivitamin with minerals tablet, Take 1 tablet by mouth once daily., Disp: , Rfl:     potassium chloride CR (Klor-Con) 10 mEq ER tablet, Take 2 tablets (20 mEq) by mouth once daily. Do not crush or chew., Disp: 60 tablet, Rfl: 3    simethicone (Mylicon) 80 mg chewable tablet, Chew 0.5 tablets (40 mg) 4 times a day as needed for flatulence., Disp: 30 tablet, Rfl: 0    simvastatin (Zocor) 20 mg tablet, Take 1 tablet (20 mg) by mouth once daily., Disp: 90 tablet, Rfl: 2    tamsulosin (Flomax) 0.4 mg 24 hr capsule, Take 1 tablet by mouth once daily., Disp: , Rfl:   Allergies   Allergen Reactions    Lisinopril Cough    Meperidine Nausea/vomiting    Adhesive Tape-Silicones Rash     There were no vitals taken for this visit.  Neurological Exam/Physical Exam:    Constitutional: General appearance: no acute distress. Pleasant.   Auscultation of Heart: Regular rate and rhythm, no murmurs, normal S1 and S2.   Carotid Arteries: no bruits  Peripheral Vascular Exam: moderate to severe edema on RLE up to knee and mild-moderate swelling in LLE.   Mental status: no distress, alert, interactive and cooperative. Affect is appropriate.   Orientation: oriented to person, oriented to place and oriented to time.   Memory:  recent memory intact.  Fund of knowledge: Patient displays adequate knowledge of current events.  Eyes: The ophthalmoscopic examination was normal.   Cranial nerve II: Visual fields full to confrontation.   Cranial nerves III, IV, and VI: Pupils round, equally reactive to light; EOMs intact. No nystagmus.   Cranial Nerve V: Facial sensation intact to LT bilaterally.   Cranial nerve VII: no facial droop  Cranial nerve VIII: Hearing is intact  Cranial nerves IX and X: Palate elevates symmetrically.   Cranial nerve XI: Shoulder shrug intact.   Cranial nerve XII: Tongue is midline.  Motor:  Muscle bulk was normal in both upper and lower extremities.    No atrophy.   Strength is normal in UE.  Has some pain on RLE hip flexion but 5-/5 on RLE.   Deep Tendon Reflexes: left biceps 2+ , right biceps 2+, left triceps 2+, right triceps 2+, left brachioradialis 2+, right brachioradialis 2+, left patella 1+, right patella 1+, left ankle jerk 1+, right ankle jerk1+   Plantar Reflex: Toes downgoing to plantar stimulation on the left. Toes downgoing to plantar stimulation on the right.   Sensory Exam: decreased LT and vibration in RLE distal to knee.   Coordination:  no limb dystaxia on UE b/l   Gait:  cautious.  Uses cane, antalgic. Uses a boot and compression sock on RLE.        Labs:  CBC:   Lab Results   Component Value Date    WBC 7.3 07/11/2024    HGB 10.8 (L) 07/11/2024    HCT 36.1 (L) 07/11/2024     07/11/2024     BMP:   Lab Results   Component Value Date     07/11/2024    K 4.4 07/11/2024     07/11/2024    CO2 27 07/11/2024    BUN 22 07/11/2024    CREATININE 0.78 07/11/2024    CALCIUM 10.1 07/11/2024    MG 2.26 06/08/2024     LFT:   Lab Results   Component Value Date    ALKPHOS 79 07/11/2024    BILITOT 0.7 07/11/2024    PROT 7.3 07/11/2024    ALBUMIN 4.7 07/11/2024    ALT 13 07/11/2024    AST 14 07/11/2024         Assessment/Plan   Problem List Items Addressed This Visit    None  Obtain EMG/ncs.  ?  Lumbar plexopathy vs focal neuropathy vs vascular.  Please see a neuromuscular neurologist - Dr. Keene, Dr. Hunt, Dr. Pierre , Dr. Kwon, Dr. White or Dr. Peterson.  Consider repeat US of the RLE to ensure no blood clot after surgery.  May consider vascular surgery and pain mgmt for continued management.   Increase gabapentin up to 600mg TID.   Plan B- If not helpful amitriptyline .

## 2024-07-26 ENCOUNTER — HOSPITAL ENCOUNTER (OUTPATIENT)
Dept: RADIOLOGY | Facility: CLINIC | Age: 79
Discharge: HOME | End: 2024-07-26
Payer: MEDICARE

## 2024-07-26 ENCOUNTER — TELEPHONE (OUTPATIENT)
Dept: ORTHOPEDIC SURGERY | Facility: CLINIC | Age: 79
End: 2024-07-26
Payer: MEDICARE

## 2024-07-26 DIAGNOSIS — G62.9 NEUROPATHY: ICD-10-CM

## 2024-07-26 DIAGNOSIS — M79.89 LEG SWELLING: ICD-10-CM

## 2024-07-26 PROCEDURE — 93971 EXTREMITY STUDY: CPT

## 2024-07-29 ENCOUNTER — OFFICE VISIT (OUTPATIENT)
Dept: WOUND CARE | Facility: CLINIC | Age: 79
End: 2024-07-29
Payer: MEDICARE

## 2024-07-29 PROCEDURE — 11042 DBRDMT SUBQ TIS 1ST 20SQCM/<: CPT

## 2024-07-30 ENCOUNTER — EVALUATION (OUTPATIENT)
Dept: PHYSICAL THERAPY | Facility: CLINIC | Age: 79
End: 2024-07-30
Payer: MEDICARE

## 2024-07-30 DIAGNOSIS — M16.11 PRIMARY OSTEOARTHRITIS OF RIGHT HIP: ICD-10-CM

## 2024-07-30 DIAGNOSIS — M25.551 RIGHT HIP PAIN: Primary | ICD-10-CM

## 2024-07-30 DIAGNOSIS — Z96.641 STATUS POST TOTAL HIP REPLACEMENT, RIGHT: ICD-10-CM

## 2024-07-30 PROCEDURE — 97161 PT EVAL LOW COMPLEX 20 MIN: CPT | Mod: GP

## 2024-07-30 PROCEDURE — 97110 THERAPEUTIC EXERCISES: CPT | Mod: GP

## 2024-07-30 NOTE — PROGRESS NOTES
"Physical Therapy Evaluation and Treatment      Patient Name: Kit Franklin  MRN: [id number]  Today's Date: 7/30/2024  Time Calculation  Start Time: 1519  Stop Time: 1558  Time Calculation (min): 39 min    Therapy Diagnoses:    Problem List Items Addressed This Visit             ICD-10-CM    Right hip pain - Primary M25.551    Relevant Orders    Follow Up In Physical Therapy    Primary osteoarthritis of right hip M16.11       Visit #: 1     Insurance:   Payor: [employer] MEDICARE / Plan: [employer] GOLD CHOICE / Product Type: *No Product type* / VISITS ARE MED NEC NEEDS AUTH [employer] PAYS AT 80% DED MET OOP 2150.00 1371.63 APPLIED   Authorization required: yes  Authorized visits: pending  Authorized date range: pending   Reason for visit: S/P R THR 6/4/24 (8 weeks)  Referring Physician: Tyler Kat MD     Assessment:   Kit Franklin is a 78 y.o. year old male who participated in a physical therapy evaluation today due to complaint of R hip and leg pain s/p R THR 6/4/24. Patient reports he was dealing with chronic R hip pain and attempted to treat conservatively with injections with minimal to no relief. Opted for surgical treatment. Received R THR 6/4/24. Patient reports he woke up from surgery in severe pain in R LE from knee to foot. Patient reports pain as \"being tazed in the bottom of the foot constantly\". States pain has improved, but is still irritable and bothersome. Reports he was in rehab facility and hospital for around a week after surgery for management of radiating R LE pain. Reports he was then staying with his daughter for a couple of weeks and has just returned home within the last 2 weeks. Patient reports he has been medically managing a wound at his R heel which was discovered about 6 days after surgery. Patient is ambulating with a [employer] off-loading boot and is unsure of how long he is going to be wearing it. Patient is ambulating with SPC. Reports he received HHOT/PT. The patient's findings are consistent " "with dx of R hip pain s/p R THR 6/4/24. Their impairments include Pain, Active ROM, Strength, Balance, Activity limitations, Fall risk, Motor function/control, Decreased functional level, and Participation restrictions. Due to these impairments the patient is limited in performing Dressing, Sleeping, Walking, Stair negotiation, and Standing. Activity limitations and participations restrictions include not being able to play golf. Kit will benefit from continued skilled physical therapy as well as development of a home exercise program to address current impairments and progress towards return to their prior level of function without limitations.    Rehab Potential: Good    Clinical Presentation:  Stable and/or uncomplicated characteristics    Level of Complexity: Low      Subjective:  Reason For Visit: Initial Evaluation  - CC: Patient arrives with complaint of R hip and leg pain s/p R THR 06/04/24  - DOS: 6/4/2024  - KARON: Surgical  - IMAGING: x-ray available  - Pertinent PMH:  - Pertinent PSH:    - HX: Patient reports he was dealing with chronic R hip pain and attempted to treat conservatively with injections with minimal to no relief. Opted for surgical treatment. Received R THR 6/4/24. Patient reports he woke up from surgery in severe pain in R LE from knee to foot. Patient reports pain as \"being tazed in the bottom of the foot constantly\". States pain has improved, but is still irritable and bothersome. Reports he was in rehab facility and hospital for around a week after surgery for management of radiating R LE pain. Reports he was then staying with his daughter for a couple of weeks and has just returned home within the last 2 weeks. Patient reports he has been medically managing a wound at his R heel which was discovered about 6 days after surgery. Patient is ambulating with a [employer] off-loading boot and is unsure of how long he is going to be wearing it. Patient is ambulating with SPC. Reports he received " HHOT/PT.     - PAIN: CURRENT: (4/10); BEST: (0/10); WORST: (7/10); LOCATION: (Right leg into foot); Description: neuropathy in R LE  - ALLEVIATES PAIN: rest and prescription medication  - EXACERBATES PAIN: sitting, standing  - CURRENT MEDICAL MANAGEMENT: squamous cell carcinoma hx, neuropathy, pressure sore R heel  - PLOF: Patient reports no functional limitations prior to injury.   - Previous Interventions/Treatments: Physical Therapy and Injections  - FUNCTIONAL LIMITATIONS: Sleeping, Walking, Stair negotiation, and Standing  - LIVING ENVIRONMENT: reviewed and no concern  - WORK:  retired  - EXERCISE: golf  - PATIENT'S GOAL:  Return to walking without pain, more control of R foot, return to playing golf.       Precautions:  S/P R THR 24 (8 weeks)  Fall Risk: Low   PMH: pressure sore R heel; squamous cell carcinoma     Red Flags: Do you have any of the following? Yes  Fever/chills, unexplained weight changes, dizziness/fainting, unexplained change in bowel or bladder functions, unexplained malaise or muscle weakness, night pain/sweats, numbness or tingling  Patient reports he has had tremors at chest since surgery - had mentioned to PCP  N/T in R foot    Objective:     Gait Assessment - antalgic with SPC; decreased stance time on R LE     AROM  Right hip flexion: 57 degrees   Left hip flexion: WNL  Right hip extension: -5 degrees    Left hip extension: WNL  Right hip ER seated: not tested today   Left hip ER seated: 18 degrees  Right hip IR seated: not tested today   Left hip IR seated: 22 degrees     MMT  Right quadriceps: 4+   Left quadriceps: 5  Right iliopsoas: not tested today 2/2 precautions    Left iliopsoas: 5  Right hamstrin   Left hamstrin    Palpation  No TTP R hip    Special Tests    30 second sit to stand - x8 with B UE support   Outcome Measures  Evaluation:    TUG   Age Group    Normal Time in Seconds  (95% Confidence Interval)   60 - 69 years:        8.1 seconds          (7.1 -  9.0)                  70 - 79 years :       9.2 seconds          (8.2 - 10.2)               80 - 99 years:       11.3 seconds        (10.0 - 12.7)             Community Dwelling Frail Older Adults  > 12-14 seconds  associated with high fall risk                 Post-op hip fracture patients at time of discharge  > 24 seconds predictive of falls within 6 months after hip fracture                   Frail older adults > 30 predictive of requiring assistive device for ambulation and being dependent in ADLs            17.68 seconds with SPC         Other Measures  Lower Extremity Funtional Score (LEFS): 27/80    Short-term goals: (2-4 weeks)  1.) Patient will report decrease in worst reported pain by 2 points. (5/10)  2.) Patient will demonstrate improved gait mechanics with increased L LE stance time and LRAD    Goals:  1.) Independent with HEP to expedite progress and promote goal achievement.  2.) Reduce worst reported pain within last 48 hours to < 2/10 in order to  allow patient to perform daily tasks without limitation/with decreased discomfort.  3.) Improve L hip flexion AROM to >/= 100 degrees   4.) Improve L LE strength to >/= 4+/5 in order to allow patient to return to PLOF and tolerate standing and walking for prolonged durations.  5.) Report change in LEFS by greater than or equal to 9 points to meet the MCID (IE 27/80)  6.) Demonstrate improved strength evidenced by improved performance of 30 second sit to stand by +2.5 transfers with reduced UE support. (IE: 8 with B UE support)  7.) Demonstrated reduced fall risk and improved balance evidenced by improved performance of TUG of < 14 seconds with LRAD.           Treatment:   1) Initial evaluation - Low complexity (29 minutes)   2) Patient educated on POC, HEP, and current condition.   3) Treatment Performed:    Therapeutic Exercise:    10 min  Supine heel slides with strap x5  Supine quad set x10 5 sec holds  Supine glute sets x10 5 sec holds      4) HEP  Provided (See Below)     Access Code: [email or web address]  URL: [email or web address]  Date: 07/30/2024  Prepared by: Deepak Culp  - Supine Heel Slide with Strap  - 1 x daily - 7 x weekly - 2 sets - 10 reps - 5 sec hold  - Supine Gluteal Sets  - 1 x daily - 7 x weekly - 2 sets - 10 reps - 5 sec hold  - Supine Quad Set  - 1 x daily - 7 x weekly - 2 sets - 10 reps - 5 sec hold     Plan:     Recommended Treatment:  Therapeutic exercise, Manual therapy, Gait training, Home program instruction and progression, Neuromuscular re-education, Therapeutic activities, Self care and home management, Instruction in activity modification, Electrical stimulation, Vasopneumatic device + cold, and Cryotherapy    Recommended Visits: 2x/wk for 8 weeks (16 visits)    Patient in agreement with POC.    Deepak Byrne, PT

## 2024-07-31 ENCOUNTER — APPOINTMENT (OUTPATIENT)
Dept: ORTHOPEDIC SURGERY | Facility: CLINIC | Age: 79
End: 2024-07-31
Payer: MEDICARE

## 2024-07-31 ENCOUNTER — APPOINTMENT (OUTPATIENT)
Dept: WOUND CARE | Facility: CLINIC | Age: 79
End: 2024-07-31
Payer: MEDICARE

## 2024-07-31 ENCOUNTER — HOSPITAL ENCOUNTER (OUTPATIENT)
Dept: RADIOLOGY | Facility: CLINIC | Age: 79
Discharge: HOME | End: 2024-07-31
Payer: MEDICARE

## 2024-07-31 DIAGNOSIS — M16.11 ARTHRITIS OF RIGHT HIP: Primary | ICD-10-CM

## 2024-07-31 DIAGNOSIS — M16.11 ARTHRITIS OF RIGHT HIP: ICD-10-CM

## 2024-07-31 PROCEDURE — 99024 POSTOP FOLLOW-UP VISIT: CPT | Performed by: ORTHOPAEDIC SURGERY

## 2024-07-31 PROCEDURE — 1036F TOBACCO NON-USER: CPT | Performed by: ORTHOPAEDIC SURGERY

## 2024-07-31 PROCEDURE — 1159F MED LIST DOCD IN RCRD: CPT | Performed by: ORTHOPAEDIC SURGERY

## 2024-07-31 PROCEDURE — 73502 X-RAY EXAM HIP UNI 2-3 VIEWS: CPT | Mod: RT

## 2024-07-31 NOTE — PROGRESS NOTES
Subjective    Patient ID: Kit Franklin is a 78 y.o. male.    Chief Complaint: Follow-up of the Right Hip (FUV R [facility]CA DOS 6/4/24)     Last Surgery: Right Total Hip Replacement - Right  Last Surgery Date: 6/4/2024    HPI  Patient comes in for follow-up of his right total hip replacement.  He is now about 7 weeks out from surgery.  His biggest issue has been the peripheral neuropathy in his right leg and a very slow healing ulcer on his right heel.  He is scheduled to see both a vascular surgeon as well as neurologist.  He did have an EMG which was positive for bilateral peripheral neuropathy.    Objective   Ortho Exam  Patient is in no acute distress.  He is using a cane to assist with ambulation he states more so because of the heel ulcer as opposed to any pain in his hip.  His incision is well-healed.  He has no complaints of pain with passive rotation of his hip.    Image Results:  Patient's right hip x-rays were personally reviewed today.  They show adequate limb of the prosthesis.  There is no evidence of any fracture, dislocation or loosening.  Vascular US lower extremity venous duplex right  Narrative: Interpreted By:  Jay Quintana,   STUDY:  [facility] US LOWER EXTREMITY VENOUS DUPLEX RIGHT;  7/26/2024 11:22 am      INDICATION:  Signs/Symptoms:worsening pain after surgery on RLE, swelling on RLE.      COMPARISON:  06/07/2024      ACCESSION NUMBER(S):  [id number]      ORDERING CLINICIAN:  EPIFANIO LEY      TECHNIQUE:  Vascular ultrasound of the  right lower extremity was performed.  Real-time compression views as well as Gray scale, color Doppler and  spectral Doppler waveform analysis was performed.      FINDINGS:  Evaluation of the visualized portions of the common femoral vein,  proximal, mid, and distal femoral vein, and popliteal vein were  performed.  Evaluation of the visualized portions of the calf veins  was also performed.      The evaluated veins demonstrate normal compressibility. There is  intact  venous flow demonstrating normal respiratory variability and  normal augmentation of flow with calf compression. Therefore, there  is no ultrasonographic evidence for deep vein thrombosis within the  evaluated veins.      Impression: No sonographic evidence for deep vein thrombosis within the evaluated  veins.      MACRO:  None.      Signed by: Jay Quintana 7/26/2024 12:36 PM  Dictation workstation:   [id number]      Assessment/Plan   Encounter Diagnoses:  Arthritis of right hip    Orders Placed This Encounter    XR hip right with pelvis when performed 2 or 3 views     Patient is doing satisfactory from the total hip replacement itself.  However he has neuropathy in his right lower extremity as well as a heel wound which is limiting his ability to ambulate.  The patient will continue with the therapy for his hip is much as he can.  He also is set to see other specialist regarding the neuropathy.  He will follow-up with me in 1 month with x-rays of his right hip.

## 2024-08-05 ENCOUNTER — APPOINTMENT (OUTPATIENT)
Dept: WOUND CARE | Facility: CLINIC | Age: 79
End: 2024-08-05
Payer: MEDICARE

## 2024-08-06 ENCOUNTER — LAB REQUISITION (OUTPATIENT)
Dept: LAB | Facility: HOSPITAL | Age: 79
End: 2024-08-06
Payer: MEDICARE

## 2024-08-06 ENCOUNTER — APPOINTMENT (OUTPATIENT)
Dept: WOUND CARE | Facility: CLINIC | Age: 79
End: 2024-08-06
Payer: MEDICARE

## 2024-08-06 ENCOUNTER — TREATMENT (OUTPATIENT)
Dept: PHYSICAL THERAPY | Facility: CLINIC | Age: 79
End: 2024-08-06
Payer: MEDICARE

## 2024-08-06 ENCOUNTER — OFFICE VISIT (OUTPATIENT)
Dept: WOUND CARE | Facility: CLINIC | Age: 79
End: 2024-08-06
Payer: MEDICARE

## 2024-08-06 DIAGNOSIS — Z96.641 PRESENCE OF RIGHT ARTIFICIAL HIP JOINT: ICD-10-CM

## 2024-08-06 DIAGNOSIS — M25.551 RIGHT HIP PAIN: ICD-10-CM

## 2024-08-06 DIAGNOSIS — L89.613 PRESSURE ULCER OF RIGHT HEEL, STAGE 3 (MULTI): ICD-10-CM

## 2024-08-06 DIAGNOSIS — G60.9 HEREDITARY AND IDIOPATHIC NEUROPATHY, UNSPECIFIED: ICD-10-CM

## 2024-08-06 DIAGNOSIS — I73.9 PERIPHERAL VASCULAR DISEASE, UNSPECIFIED (CMS-HCC): ICD-10-CM

## 2024-08-06 PROCEDURE — 99204 OFFICE O/P NEW MOD 45 MIN: CPT | Performed by: SURGERY

## 2024-08-06 PROCEDURE — 97110 THERAPEUTIC EXERCISES: CPT | Mod: GP

## 2024-08-06 PROCEDURE — 11042 DBRDMT SUBQ TIS 1ST 20SQCM/<: CPT | Performed by: SURGERY

## 2024-08-06 PROCEDURE — 11042 DBRDMT SUBQ TIS 1ST 20SQCM/<: CPT

## 2024-08-06 PROCEDURE — 87077 CULTURE AEROBIC IDENTIFY: CPT | Mod: OUT,PARLAB | Performed by: SURGERY

## 2024-08-06 NOTE — PROGRESS NOTES
PHYSICAL THERAPY TREATMENT NOTE    Patient Name:  Kit Franklin   Patient MRN: [id number]  Date: 08/06/24  Time Calculation  Start Time: 1510  Stop Time: 1555  Time Calculation (min): 45 min      Problem List Items Addressed This Visit             ICD-10-CM    Right hip pain M25.551       Insurance:  Visit number: 1/10  Insurance Type: Payor: [employer]ICARE / Plan: [employer] GOLD CHOICE / Product Type: *No Product type* /   Authorization required: yes  Authorized visits: 10  Authorized date range: 7/30/24-9/27/24    General:  Reason for visit: S/P R THR 6/4/24   Referring Physician: MD Jazlyn Barkley MD appt:  08/13/24    Precautions:  Fall Risk: Low   PMH: pressure sore R heel; squamous cell carcinoma    Assessment:  Patient tolerated treatment session well. Was met with some challenge operating upright bike due to boot, but was better once boot was removed and was able to prevent contact of heel. Upon review of supine heel slides, patient demonstrated that he had been performing without boot on and while lifting foot off table. Discontinued exercise here and for HEP to prevent breaking postoperative protocols. Significantly challenged  2/2 muscular weakness with SLR and only able to perform with strap. Patient was appropriately challenged with remaining strengthening interventions with expected levels of muscular fatigue. Patient unable to assume upright enough position to achieve sufficient hip flexor stretch on step. Updated HEP as indicated below. Instructed patient to call office if anticipated operation will intervene with schedule appointment for 8/8 and told patient to inquire if therapy services need to be placed on hold as a result of operation. Would benefit from continued PT services to further assess and address remaining impairments and progress towards achieving established  "goals.     Education: Reviewed home exercise program. Home exercise program instructed and issued. Answered questions about home exercise program. Provided manual cues for correct performance of exercises. Provided verbal feedback to improve exercise technique.  Progress towards functional goals: Improved gait quality. Reduced pain level.  Response to interventions: Patient tolerated therapeutic interventions well and without any adverse events. Improved independence with home exercises. Patient was appropriately fatigued with no complaints. Improved tolerance to strengthening progressions.  Justification for continued skilled care: To address remaining functional, objective and subjective deficits to allow them to return to full independence with ADLs. Assess effectiveness of HEP. Modify and progress home exercise program.    Plan:  Assess tolerance to HEP. Start with supine hip flexor stretch. Attempt prone SLR for and prone HSC with AW. Attempt intro of static balance interventions.    Subjective:   Kit reports he feels like his condition is improving. Patient reports he had appointment with vascular doctor today and is set to receive surgery to improve blood flow to R foot for management of sx and pressure ulcer. Reports he is expected to have surgery by 8/9/24. Progress towards functional goal:  Reduced pain level.   Pain (0-10): 3    HEP adherence / understanding: compliance with the instructed home exercises.    Objective:  Gait assessment - antalgic gait with boot and SPC. Reduced stance time on R LE    Treatment Performed: (\"NP\" = Not Performed)     Therapeutic Exercise:     45 minutes  Access Code: [email or web address]   Upright bike seat height 5 - 5 minutes (boot removed - used step to get up to bike)  Supine heel slide with strap x3 - discontinued  Supine quad set with heel prop x10 5 sec holds  SLR x6  SLR with strap assist 2x8  SAQ 2x15 3 sec holds  HL ADD iso vs ball 2x15 5 sec holds  HL ABD iso vs gold TB 2x10 " 5 sec holds  Standing hip flexion stretch on step x2 20 sec holds - ineffective for patient    Access Code: [email or web address]  URL: [email or web address]  Date: 08/06/2024  Prepared by: Deepak Byrne    Exercises  - Long Sitting Quad Set with Towel Roll Under Heel  - 1-2 x daily - 7 x weekly - 3 sets - 15 reps - 5 sec hold  - Supine Short Arc Quad  - 1-2 x daily - 7 x weekly - 3 sets - 15 reps - 3 sec hold  - Supine Hip Adduction Isometric with Ball  - 1-2 x daily - 7 x weekly - 2 sets - 15 reps - 5 sec hold  - Hooklying Isometric Clamshell  - 1-2 x daily - 7 x weekly - 2 sets - 15 reps - 5 sec hold

## 2024-08-07 ENCOUNTER — HOSPITAL ENCOUNTER (OUTPATIENT)
Dept: RADIOLOGY | Facility: CLINIC | Age: 79
Discharge: HOME | End: 2024-08-07
Payer: MEDICARE

## 2024-08-07 DIAGNOSIS — Z96.641 PRESENCE OF RIGHT ARTIFICIAL HIP JOINT: ICD-10-CM

## 2024-08-07 DIAGNOSIS — L89.613 PRESSURE ULCER OF RIGHT HEEL, STAGE 3 (MULTI): ICD-10-CM

## 2024-08-07 DIAGNOSIS — I73.9 PERIPHERAL VASCULAR DISEASE, UNSPECIFIED (CMS-HCC): ICD-10-CM

## 2024-08-07 DIAGNOSIS — G60.9 HEREDITARY AND IDIOPATHIC NEUROPATHY, UNSPECIFIED: ICD-10-CM

## 2024-08-07 PROCEDURE — 2550000001 HC RX 255 CONTRASTS: Performed by: FAMILY MEDICINE

## 2024-08-07 PROCEDURE — 74174 CTA ABD&PLVS W/CONTRAST: CPT

## 2024-08-08 ENCOUNTER — PREP FOR PROCEDURE (OUTPATIENT)
Dept: VASCULAR SURGERY | Facility: HOSPITAL | Age: 79
End: 2024-08-08
Payer: MEDICARE

## 2024-08-08 ENCOUNTER — APPOINTMENT (OUTPATIENT)
Dept: PHYSICAL THERAPY | Facility: CLINIC | Age: 79
End: 2024-08-08
Payer: MEDICARE

## 2024-08-08 DIAGNOSIS — I70.25 ISCHEMIC FOOT ULCER DUE TO ATHEROSCLEROSIS OF NATIVE ARTERY OF LIMB (MULTI): ICD-10-CM

## 2024-08-08 DIAGNOSIS — L97.509 ISCHEMIC FOOT ULCER DUE TO ATHEROSCLEROSIS OF NATIVE ARTERY OF LIMB (MULTI): ICD-10-CM

## 2024-08-08 DIAGNOSIS — I73.9 PAD (PERIPHERAL ARTERY DISEASE) (CMS-HCC): Primary | ICD-10-CM

## 2024-08-08 RX ORDER — SODIUM CHLORIDE 9 MG/ML
100 INJECTION, SOLUTION INTRAVENOUS CONTINUOUS
OUTPATIENT
Start: 2024-08-15

## 2024-08-09 LAB
BACTERIA SPEC CULT: ABNORMAL
BACTERIA SPEC CULT: ABNORMAL
GRAM STN SPEC: ABNORMAL
GRAM STN SPEC: ABNORMAL

## 2024-08-12 ENCOUNTER — PRE-ADMISSION TESTING (OUTPATIENT)
Dept: PREADMISSION TESTING | Facility: HOSPITAL | Age: 79
DRG: 252 | End: 2024-08-12
Payer: MEDICARE

## 2024-08-12 ENCOUNTER — OFFICE VISIT (OUTPATIENT)
Dept: WOUND CARE | Facility: CLINIC | Age: 79
End: 2024-08-12
Payer: MEDICARE

## 2024-08-12 DIAGNOSIS — Z01.818 PREOP TESTING: Primary | ICD-10-CM

## 2024-08-12 DIAGNOSIS — I70.25 ISCHEMIC FOOT ULCER DUE TO ATHEROSCLEROSIS OF NATIVE ARTERY OF LIMB (MULTI): ICD-10-CM

## 2024-08-12 DIAGNOSIS — I73.9 PAD (PERIPHERAL ARTERY DISEASE) (CMS-HCC): ICD-10-CM

## 2024-08-12 DIAGNOSIS — L97.509 ISCHEMIC FOOT ULCER DUE TO ATHEROSCLEROSIS OF NATIVE ARTERY OF LIMB (MULTI): ICD-10-CM

## 2024-08-12 LAB
ERYTHROCYTE [DISTWIDTH] IN BLOOD BY AUTOMATED COUNT: 14.8 % (ref 11.5–14.5)
HCT VFR BLD AUTO: 38.6 % (ref 41–52)
HGB BLD-MCNC: 12.3 G/DL (ref 13.5–17.5)
MCH RBC QN AUTO: 29.6 PG (ref 26–34)
MCHC RBC AUTO-ENTMCNC: 31.9 G/DL (ref 32–36)
MCV RBC AUTO: 93 FL (ref 80–100)
NRBC BLD-RTO: 0 /100 WBCS (ref 0–0)
PLATELET # BLD AUTO: 193 X10*3/UL (ref 150–450)
RBC # BLD AUTO: 4.16 X10*6/UL (ref 4.5–5.9)
WBC # BLD AUTO: 5.7 X10*3/UL (ref 4.4–11.3)

## 2024-08-12 PROCEDURE — 85027 COMPLETE CBC AUTOMATED: CPT

## 2024-08-12 PROCEDURE — 36415 COLL VENOUS BLD VENIPUNCTURE: CPT

## 2024-08-12 PROCEDURE — 93010 ELECTROCARDIOGRAM REPORT: CPT | Performed by: INTERNAL MEDICINE

## 2024-08-12 PROCEDURE — 93005 ELECTROCARDIOGRAM TRACING: CPT | Mod: 59

## 2024-08-12 PROCEDURE — 11042 DBRDMT SUBQ TIS 1ST 20SQCM/<: CPT

## 2024-08-12 PROCEDURE — 99203 OFFICE O/P NEW LOW 30 MIN: CPT

## 2024-08-12 RX ORDER — CYCLOBENZAPRINE HCL 5 MG
TABLET ORAL
COMMUNITY

## 2024-08-12 RX ORDER — AMOXICILLIN AND CLAVULANATE POTASSIUM 875; 125 MG/1; MG/1
875 TABLET, FILM COATED ORAL 2 TIMES DAILY
COMMUNITY

## 2024-08-12 ASSESSMENT — DUKE ACTIVITY SCORE INDEX (DASI)
TOTAL_SCORE: 12.7
CAN YOU PARTICIPATE IN STRENOUS SPORTS LIKE SWIMMING, SINGLES TENNIS, FOOTBALL, BASKETBALL, OR SKIING: NO
CAN YOU RUN A SHORT DISTANCE: NO
CAN YOU DO HEAVY WORK AROUND THE HOUSE LIKE SCRUBBING FLOORS OR LIFTING AND MOVING HEAVY FURNITURE: NO
CAN YOU WALK A BLOCK OR TWO ON LEVEL GROUND: NO
CAN YOU CLIMB A FLIGHT OF STAIRS OR WALK UP A HILL: YES
CAN YOU WALK INDOORS, SUCH AS AROUND YOUR HOUSE: YES
CAN YOU DO YARD WORK LIKE RAKING LEAVES, WEEDING OR PUSHING A MOWER: NO
CAN YOU DO LIGHT WORK AROUND THE HOUSE LIKE DUSTING OR WASHING DISHES: YES
CAN YOU DO MODERATE WORK AROUND THE HOUSE LIKE VACUUMING, SWEEPING FLOORS OR CARRYING GROCERIES: NO
DASI METS SCORE: 4.3
CAN YOU TAKE CARE OF YOURSELF (EAT, DRESS, BATHE, OR USE TOILET): YES
CAN YOU PARTICIPATE IN MODERATE RECREATIONAL ACTIVITIES LIKE GOLF, BOWLING, DANCING, DOUBLES TENNIS OR THROWING A BASEBALL OR FOOTBALL: NO
CAN YOU HAVE SEXUAL RELATIONS: NO

## 2024-08-12 ASSESSMENT — PAIN SCALES - GENERAL: PAINLEVEL_OUTOF10: 4

## 2024-08-12 ASSESSMENT — PAIN - FUNCTIONAL ASSESSMENT: PAIN_FUNCTIONAL_ASSESSMENT: 0-10

## 2024-08-12 NOTE — PREPROCEDURE INSTRUCTIONS
Medication List            Accurate as of August 12, 2024  2:56 PM. Always use your most recent med list.                acetaminophen 325 mg tablet  Commonly known as: Tylenol  Take 2 tablets (650 mg) by mouth every 6 hours.  Medication Adjustments for Surgery: Other (Comment)  Notes to patient: Continue as prescribed     cyclobenzaprine 5 mg tablet  Commonly known as: Flexeril  Medication Adjustments for Surgery: Continue until night before surgery     furosemide 20 mg tablet  Commonly known as: Lasix  Take 1-2 tablets (20-40 mg) by mouth once daily.  Medication Adjustments for Surgery: Other (Comment)  Notes to patient: Hold day of surgery     gabapentin 300 mg capsule  Commonly known as: Neurontin  Take 1 capsule in am daily x 7 days then 2 caps in am daily x 7 days then 2 caps in am and 2 caps in midday (600mg BID) and continue  Medication Adjustments for Surgery: Other (Comment)  Notes to patient: Continue as prescribed     metoprolol succinate XL 50 mg 24 hr tablet  Commonly known as: Toprol-XL  Take 1 tablet (50 mg) by mouth once daily.  Medication Adjustments for Surgery: Take morning of surgery with sip of water, no other fluids  Notes to patient: Continue as prescribed     multivitamin with minerals tablet  Medication Adjustments for Surgery: Stop 7 days before surgery     potassium chloride CR 10 mEq ER tablet  Commonly known as: Klor-Con  Take 2 tablets (20 mEq) by mouth once daily. Do not crush or chew.  Medication Adjustments for Surgery: Continue until night before surgery     Prevalite 4 gram packet  Generic drug: cholestyramine-aspartame  Take 1 packet (4 g) by mouth 2 times a day as needed (diarrhea).  Medication Adjustments for Surgery: Continue until night before surgery     simethicone 80 mg chewable tablet  Commonly known as: Mylicon  Chew 0.5 tablets (40 mg) 4 times a day as needed for flatulence.  Medication Adjustments for Surgery: Continue until night before surgery     simvastatin 20  mg tablet  Commonly known as: Zocor  Take 1 tablet (20 mg) by mouth once daily.  Medication Adjustments for Surgery: Other (Comment)  Notes to patient: Continue as prescribed     tamsulosin 0.4 mg 24 hr capsule  Commonly known as: Flomax  Medication Adjustments for Surgery: Other (Comment)  Notes to patient: Continue as prescribed                              NPO Instructions:    Do not eat any food after midnight the night before your surgery/procedure.    Additional Instructions:     Day of Surgery:  You may have clear liquids until TWO hours before surgery/procedure.  This includes water, black tea/coffee, (no milk or cream) apple juice and electrolyte drinks (Gatorade)  Wear  comfortable loose fitting clothing  Do not use moisturizers, creams, lotions or perfume  All jewelry and valuables should be left at home    PRE-OPERATIVE INSTRUCTIONS FOR SURGERY    *Do not eat anything after midnight the night of surgery.  This includes food of any kind (including hard candy, cough drops, mints).   You may have up to 13.5 ounces of clear liquid  until TWO hours prior to your arrival time to the hospital.  This includes water, black tea/coffee, (no milk or cream) apple juice and electrolyte drinks (GATORADE).  You may chew gum until TWO hours prior you your surgery/procedure.         *One of our staff members will call you ONE business day before your surgery, between 11am-2 pm to let you know the time to arrive.  If you have not received a call by 2 pm, call [phone]  *When you arrive at the hospital-->GO TO Registration on the ground floor  *Stop smoking 24 hours prior to surgery.  No Marijuana, CBD Oil or Vaping for 48 hours  *No alcohol 24 hours prior to surgery  *You will need a responsible adult to drive you home  -No acrylic nails or nail polish on at least one fingernail, NO polish on toes for foot surgery  -You may be asked to remove your dentures, partial plate, eyeglasses or contact lenses before going to  surgery.  Please bring a case for these items.  -Body piercings need to be removed.  Jewelry and valuables should be left at home.  -Put on loose,  comfortable, clean clothing, that will accommodate bandages    Thank you for letting us assist you with preparing for your procedure. Please call us with any questions. Courtney DESOUZA

## 2024-08-12 NOTE — CPM/PAT H&P
"CPM/PAT Evaluation       Name: Kit Franklin (Kit Franklin)  /Age: 1945/ y.o.     Visit Type:   In-Person       Chief Complaint: right ankle/foot pain    HPI  Patient is a 78 year old male with a history of HTN, HLD, PAD, RBBB, systolic murmur, IBS, and osteoarthritis who presents today for preoperative evaluation. Patient had his right hip replaced on 24. Since surgery he has had numbness and severe pain in his right leg. Pain started below his knee and has gradually improved to now just above the ankle and into the foot. He describes pain as constant aching with intermittent \"shock\" like shooting pain through the ankle into the foot. He does have a pressure ulcer on his right heel as well that contributes to the pain. He cannot ascertain if anything aggravates or alleviates symptoms. He was evaluated to neurology for neuralgia of the right leg and EMG negative. Patient tried gabapentin and flexeril but is unsure if either of those medications are helping his symptoms. CT angio was performed and demonstrates multifocal atherosclerotic disease of the abdominal aorta and its branches extending to the bilateral lower extremities. Patient is scheduled to undergo aortobifemoral arteriogram with possible intervention right leg with Dr. Brown on 8/15/24. He denies recent illness, fever, chills, cough, shortness of breath, chest pain, lower extremity edema, urinary or GI symptoms.       Past Medical History:   Diagnosis Date    Arthritis     HL (hearing loss)     Hypertension     Nephrolithiasis     Personal history of other diseases of the circulatory system 2021    History of hypertension    Personal history of other diseases of the digestive system 2021    History of pancreatitis    Personal history of other diseases of the musculoskeletal system and connective tissue 2021    History of back pain    Personal history of other diseases of the musculoskeletal system and connective tissue " 03/09/2021    History of back pain    Personal history of other endocrine, nutritional and metabolic disease 03/09/2021    History of hypercholesterolemia       Past Surgical History:   Procedure Laterality Date    OTHER SURGICAL HISTORY  2008    Cervical vertebral fusion    OTHER SURGICAL HISTORY  2019    Colectomy    OTHER SURGICAL HISTORY  03/09/2021    Colonoscopy    OTHER SURGICAL HISTORY  03/09/2021    Skin biopsy    OTHER SURGICAL HISTORY  2015 Right; 2017 Left    Total Knee Replacement    SHOULDER SURGERY Left 2013    Shoulder Surgery       Patient  has no history on file for sexual activity.    Family History   Problem Relation Name Age of Onset    Dementia Mother      Hypertension Mother      Other (Senile dementia) Mother      Dementia Father      Diabetes Father      Hypertension Father      Mitral valve prolapse Father      Heart attack Father      Other (Senile dementia) Father         Allergies   Allergen Reactions    Lisinopril Cough    Meperidine Nausea/vomiting    Adhesive Tape-Silicones Rash       Prior to Admission medications    Medication Sig Start Date End Date Taking? Authorizing Provider   acetaminophen (Tylenol) 325 mg tablet Take 2 tablets (650 mg) by mouth every 6 hours. 6/5/24   Jen Rosen, APRN-CNS   cholestyramine-aspartame (Prevalite) 4 gram packet Take 1 packet (4 g) by mouth 2 times a day as needed (diarrhea). 11/2/23 11/1/24  Elias Morley, DO   furosemide (Lasix) 20 mg tablet Take 1-2 tablets (20-40 mg) by mouth once daily. 7/2/24 10/30/24  Elias Morley, DO   gabapentin (Neurontin) 300 mg capsule Take 1 capsule in am daily x 7 days then 2 caps in am daily x 7 days then 2 caps in am and 2 caps in midday (600mg BID) and continue 7/23/24   Collette Oneal MD   metoprolol succinate XL (Toprol-XL) 50 mg 24 hr tablet Take 1 tablet (50 mg) by mouth once daily. 12/7/23 12/6/24  Elias Morley DO   multivitamin with minerals tablet Take 1 tablet by mouth once daily.     Historical Provider, MD   potassium chloride CR (Klor-Con) 10 mEq ER tablet Take 2 tablets (20 mEq) by mouth once daily. Do not crush or chew. 7/2/24 10/30/24  Elias Morley DO   simethicone (Mylicon) 80 mg chewable tablet Chew 0.5 tablets (40 mg) 4 times a day as needed for flatulence.  Patient not taking: Reported on 7/23/2024 6/10/24   Morro Merrill DO   simvastatin (Zocor) 20 mg tablet Take 1 tablet (20 mg) by mouth once daily. 4/26/24   Elias Morley DO   tamsulosin (Flomax) 0.4 mg 24 hr capsule Take 1 tablet by mouth once daily. 4/2/23   Historical Provider, MD      A ten-point review of systems was completed and is otherwise negative except for what is mentioned in the HPI above.    Physical Exam:    GENERAL: Well developed, awake/alert/oriented x3, no distress, alert and cooperative  HEENT: MMM  NECK: Trachea midline, no lymphadenopathy  CARDIOVASCULAR: RRR, normal S1 and S 2, systolic murmur appreciated, 2+ equal pulses of the extremities  RESPIRATORY: Patent airways, CTAB, normal breath sounds with good chest expansion, thorax symmetric  ABDOMEN: Soft, non-tender, no distention  SKIN: Warm and dry  EXTREMITIES: No cyanosis. Trace pitting edema to bilateral lower legs R > L. Orthopedic shoe in place.   NEURO: A&O x 3, decreased sensation to RLE, no focal deficits   PSYCH: Appropriate mood and behavior      PAT AIRWAY:   Airway:     Mallampati::  II    TM distance::  >3 FB    Neck ROM::  Full  normal        There were no vitals taken for this visit.    DASI Risk Score    No data to display       Caprini DVT Assessment    No data to display       Modified Frailty Index    No data to display       CHADS2 Stroke Risk  Current as of today        N/A 3 to 100%: High Risk   2 to < 3%: Medium Risk   0 to < 2%: Low Risk     Last Change: N/A          This score determines the patient's risk of having a stroke if the patient has atrial fibrillation.        This score is not applicable to this patient.  Components are not calculated.          Revised Cardiac Risk Index    No data to display       Apfel Simplified Score    No data to display       Risk Analysis Index Results This Encounter    No data found in the last 1 encounters.         Assessment and Plan:   Patient is a 78 year old male with a history of HTN, HLD, PAD, RBBB, systolic murmur, IBS, osteoarthritis and recent right hip replacement.     #PAD  Scheduled to undergo aortobifemoral arteriogram with possible intervention right leg with Dr. Brown on 8/15/24.     #HTN, hx  #HLD, hx  #RBBB, hx  EKG from 5/1/24 demonstrates NSR with sinus arrhythmia and RBBB  EKG obtained today demonstrates NSR with RBBB    #Wound of right heel  8/6 + MSSA and acinetobacter calcoaceticus-baumannii   Patient is on Erythromycin for this  Wound care at wound center - appointment scheduled for later today    CMP from 7/11/24 reviewed and unremarkable  CBC from 7/11/24 demonstrates 10.8 - repeat today 12.3    I spent 30 minutes in the professional and overall care of this patient. Greater than 50% of this time was spent counseling patient, reviewing plan of care.    Diana Giang, APRN-CNP

## 2024-08-13 ENCOUNTER — APPOINTMENT (OUTPATIENT)
Dept: WOUND CARE | Facility: CLINIC | Age: 79
End: 2024-08-13
Payer: MEDICARE

## 2024-08-13 ENCOUNTER — APPOINTMENT (OUTPATIENT)
Dept: PRIMARY CARE | Facility: CLINIC | Age: 79
End: 2024-08-13
Payer: MEDICARE

## 2024-08-13 VITALS
BODY MASS INDEX: 31.54 KG/M2 | DIASTOLIC BLOOD PRESSURE: 68 MMHG | HEART RATE: 98 BPM | OXYGEN SATURATION: 98 % | WEIGHT: 207.4 LBS | SYSTOLIC BLOOD PRESSURE: 114 MMHG

## 2024-08-13 DIAGNOSIS — K86.2 PANCREATIC CYST (HHS-HCC): ICD-10-CM

## 2024-08-13 DIAGNOSIS — I73.9 PAD (PERIPHERAL ARTERY DISEASE) (CMS-HCC): Primary | ICD-10-CM

## 2024-08-13 DIAGNOSIS — G57.91 NEUROPATHY OF RIGHT LOWER EXTREMITY: ICD-10-CM

## 2024-08-13 DIAGNOSIS — N20.0 NEPHROLITHIASIS: ICD-10-CM

## 2024-08-13 PROCEDURE — 1123F ACP DISCUSS/DSCN MKR DOCD: CPT | Performed by: FAMILY MEDICINE

## 2024-08-13 PROCEDURE — 3078F DIAST BP <80 MM HG: CPT | Performed by: FAMILY MEDICINE

## 2024-08-13 PROCEDURE — 1036F TOBACCO NON-USER: CPT | Performed by: FAMILY MEDICINE

## 2024-08-13 PROCEDURE — 1158F ADVNC CARE PLAN TLK DOCD: CPT | Performed by: FAMILY MEDICINE

## 2024-08-13 PROCEDURE — 1125F AMNT PAIN NOTED PAIN PRSNT: CPT | Performed by: FAMILY MEDICINE

## 2024-08-13 PROCEDURE — 99214 OFFICE O/P EST MOD 30 MIN: CPT | Performed by: FAMILY MEDICINE

## 2024-08-13 PROCEDURE — 1159F MED LIST DOCD IN RCRD: CPT | Performed by: FAMILY MEDICINE

## 2024-08-13 PROCEDURE — 3074F SYST BP LT 130 MM HG: CPT | Performed by: FAMILY MEDICINE

## 2024-08-13 RX ORDER — TAMSULOSIN HYDROCHLORIDE 0.4 MG/1
0.4 CAPSULE ORAL DAILY
Qty: 90 CAPSULE | Refills: 2 | Status: SHIPPED | OUTPATIENT
Start: 2024-08-13

## 2024-08-13 ASSESSMENT — PAIN SCALES - GENERAL: PAINLEVEL: 5

## 2024-08-13 ASSESSMENT — ENCOUNTER SYMPTOMS: DEPRESSION: 0

## 2024-08-13 NOTE — H&P (VIEW-ONLY)
Subjective   Patient ID: Kit Franklin is a 78 y.o. male who presents for Follow-up (1 month follow up for right foot.).    HPI   Patient here for follow-up.  He is accompanied by his wife.  He is still struggling with his right leg.  He has a slow healing ulcer on his heel, vascular issues as well as neurologic issues.  We reviewed his CAT scan and EMG results.  Patient is scheduled for a right lower extremity bypass procedure this Thursday.  Review of Systems    Objective   /68 (BP Location: Left arm, Patient Position: Sitting, BP Cuff Size: Adult)   Pulse 98   Wt 94.1 kg (207 lb 6.4 oz)   SpO2 98%   BMI 31.54 kg/m²     Physical Exam  Alert, pleasant and in no acute distress.  Heart: Regular rate and rhythm without murmur  Lungs: Clear to auscultation  Lower extremities: No edema  Assessment/Plan   Problem List Items Addressed This Visit             ICD-10-CM    Pancreatic cyst ([facility]) K86.2    PAD (peripheral artery disease) (CMS-HCC) - Primary I73.9     Other Visit Diagnoses         Codes    Neuropathy of right lower extremity     G57.91    Nephrolithiasis     N20.0    Relevant Medications    tamsulosin (Flomax) 0.4 mg 24 hr capsule        Patient here with his wife.  We reviewed his test results.  He has surgery scheduled for Thursday.  If things do not improve with the numbness in his leg, we will reach out for neuromuscular consultation.  Cysts of the pancreas are slightly changed.  This has been monitored for years.  Patient comfortable with rechecking here.  History of kidney stones and using Flomax with good success.  Refill provided

## 2024-08-13 NOTE — PROGRESS NOTES
Subjective   Patient ID: Kit Franklin is a 78 y.o. male who presents for Follow-up (1 month follow up for right foot.).    HPI   Patient here for follow-up.  He is accompanied by his wife.  He is still struggling with his right leg.  He has a slow healing ulcer on his heel, vascular issues as well as neurologic issues.  We reviewed his CAT scan and EMG results.  Patient is scheduled for a right lower extremity bypass procedure this Thursday.  Review of Systems    Objective   /68 (BP Location: Left arm, Patient Position: Sitting, BP Cuff Size: Adult)   Pulse 98   Wt 94.1 kg (207 lb 6.4 oz)   SpO2 98%   BMI 31.54 kg/m²     Physical Exam  Alert, pleasant and in no acute distress.  Heart: Regular rate and rhythm without murmur  Lungs: Clear to auscultation  Lower extremities: No edema  Assessment/Plan   Problem List Items Addressed This Visit             ICD-10-CM    Pancreatic cyst ([facility]) K86.2    PAD (peripheral artery disease) (CMS-HCC) - Primary I73.9     Other Visit Diagnoses         Codes    Neuropathy of right lower extremity     G57.91    Nephrolithiasis     N20.0    Relevant Medications    tamsulosin (Flomax) 0.4 mg 24 hr capsule        Patient here with his wife.  We reviewed his test results.  He has surgery scheduled for Thursday.  If things do not improve with the numbness in his leg, we will reach out for neuromuscular consultation.  Cysts of the pancreas are slightly changed.  This has been monitored for years.  Patient comfortable with rechecking here.  History of kidney stones and using Flomax with good success.  Refill provided

## 2024-08-15 ENCOUNTER — APPOINTMENT (OUTPATIENT)
Dept: RADIOLOGY | Facility: HOSPITAL | Age: 79
DRG: 252 | End: 2024-08-15
Payer: MEDICARE

## 2024-08-15 ENCOUNTER — ANESTHESIA (OUTPATIENT)
Dept: OPERATING ROOM | Facility: HOSPITAL | Age: 79
End: 2024-08-15
Payer: MEDICARE

## 2024-08-15 ENCOUNTER — HOSPITAL ENCOUNTER (INPATIENT)
Facility: HOSPITAL | Age: 79
LOS: 1 days | Discharge: HOME | DRG: 252 | End: 2024-08-16
Attending: SURGERY | Admitting: SURGERY
Payer: MEDICARE

## 2024-08-15 ENCOUNTER — ANESTHESIA EVENT (OUTPATIENT)
Dept: OPERATING ROOM | Facility: HOSPITAL | Age: 79
End: 2024-08-15
Payer: MEDICARE

## 2024-08-15 VITALS
TEMPERATURE: 98.2 F | DIASTOLIC BLOOD PRESSURE: 59 MMHG | HEART RATE: 100 BPM | BODY MASS INDEX: 31.07 KG/M2 | OXYGEN SATURATION: 97 % | RESPIRATION RATE: 18 BRPM | WEIGHT: 205.03 LBS | SYSTOLIC BLOOD PRESSURE: 117 MMHG | HEIGHT: 68 IN

## 2024-08-15 DIAGNOSIS — I99.8 ISCHEMIC FOOT: ICD-10-CM

## 2024-08-15 DIAGNOSIS — I73.9 PAD (PERIPHERAL ARTERY DISEASE) (CMS-HCC): Primary | ICD-10-CM

## 2024-08-15 LAB
ABO GROUP (TYPE) IN BLOOD: NORMAL
ANTIBODY SCREEN: NORMAL
RH FACTOR (ANTIGEN D): NORMAL

## 2024-08-15 PROCEDURE — 2550000001 HC RX 255 CONTRASTS: Performed by: SURGERY

## 2024-08-15 PROCEDURE — 2720000007 HC OR 272 NO HCPCS: Performed by: SURGERY

## 2024-08-15 PROCEDURE — B41DZZZ FLUOROSCOPY OF AORTA AND BILATERAL LOWER EXTREMITY ARTERIES: ICD-10-PCS | Performed by: SURGERY

## 2024-08-15 PROCEDURE — 2500000004 HC RX 250 GENERAL PHARMACY W/ HCPCS (ALT 636 FOR OP/ED): Performed by: NURSE ANESTHETIST, CERTIFIED REGISTERED

## 2024-08-15 PROCEDURE — 3600000003 HC OR TIME - INITIAL BASE CHARGE - PROCEDURE LEVEL THREE: Performed by: SURGERY

## 2024-08-15 PROCEDURE — 2500000005 HC RX 250 GENERAL PHARMACY W/O HCPCS: Performed by: SURGERY

## 2024-08-15 PROCEDURE — 36415 COLL VENOUS BLD VENIPUNCTURE: CPT | Performed by: ANESTHESIOLOGY

## 2024-08-15 PROCEDURE — 35371 RECHANNELING OF ARTERY: CPT | Performed by: SURGERY

## 2024-08-15 PROCEDURE — C1874 STENT, COATED/COV W/DEL SYS: HCPCS | Performed by: SURGERY

## 2024-08-15 PROCEDURE — 3700000001 HC GENERAL ANESTHESIA TIME - INITIAL BASE CHARGE: Performed by: SURGERY

## 2024-08-15 PROCEDURE — 04CK0ZZ EXTIRPATION OF MATTER FROM RIGHT FEMORAL ARTERY, OPEN APPROACH: ICD-10-PCS | Performed by: SURGERY

## 2024-08-15 PROCEDURE — C1894 INTRO/SHEATH, NON-LASER: HCPCS | Performed by: SURGERY

## 2024-08-15 PROCEDURE — C1769 GUIDE WIRE: HCPCS | Performed by: SURGERY

## 2024-08-15 PROCEDURE — 7100000002 HC RECOVERY ROOM TIME - EACH INCREMENTAL 1 MINUTE: Performed by: SURGERY

## 2024-08-15 PROCEDURE — C1887 CATHETER, GUIDING: HCPCS | Performed by: SURGERY

## 2024-08-15 PROCEDURE — C1725 CATH, TRANSLUMIN NON-LASER: HCPCS | Performed by: SURGERY

## 2024-08-15 PROCEDURE — 047C0DZ DILATION OF RIGHT COMMON ILIAC ARTERY WITH INTRALUMINAL DEVICE, OPEN APPROACH: ICD-10-PCS | Performed by: SURGERY

## 2024-08-15 PROCEDURE — 86901 BLOOD TYPING SEROLOGIC RH(D): CPT | Performed by: ANESTHESIOLOGY

## 2024-08-15 PROCEDURE — C1762 CONN TISS, HUMAN(INC FASCIA): HCPCS | Performed by: SURGERY

## 2024-08-15 PROCEDURE — 2500000005 HC RX 250 GENERAL PHARMACY W/O HCPCS: Performed by: NURSE ANESTHETIST, CERTIFIED REGISTERED

## 2024-08-15 PROCEDURE — 3700000002 HC GENERAL ANESTHESIA TIME - EACH INCREMENTAL 1 MINUTE: Performed by: SURGERY

## 2024-08-15 PROCEDURE — 2060000001 HC INTERMEDIATE ICU ROOM DAILY

## 2024-08-15 PROCEDURE — 7100000001 HC RECOVERY ROOM TIME - INITIAL BASE CHARGE: Performed by: SURGERY

## 2024-08-15 PROCEDURE — 04UK0KZ SUPPLEMENT RIGHT FEMORAL ARTERY WITH NONAUTOLOGOUS TISSUE SUBSTITUTE, OPEN APPROACH: ICD-10-PCS | Performed by: SURGERY

## 2024-08-15 PROCEDURE — 37221 PR REVSC OPN/PRQ ILIAC ART W/STNT PLMT & ANGIOPLSTY: CPT | Performed by: SURGERY

## 2024-08-15 PROCEDURE — 2780000003 HC OR 278 NO HCPCS: Performed by: SURGERY

## 2024-08-15 PROCEDURE — 76000 FLUOROSCOPY <1 HR PHYS/QHP: CPT

## 2024-08-15 PROCEDURE — 3600000008 HC OR TIME - EACH INCREMENTAL 1 MINUTE - PROCEDURE LEVEL THREE: Performed by: SURGERY

## 2024-08-15 PROCEDURE — 2500000004 HC RX 250 GENERAL PHARMACY W/ HCPCS (ALT 636 FOR OP/ED): Performed by: SURGERY

## 2024-08-15 PROCEDURE — C1760 CLOSURE DEV, VASC: HCPCS | Performed by: SURGERY

## 2024-08-15 DEVICE — GRAFT, XENOSURE, BIOLOGIC PATCH, 0.8CM X 8CM: Type: IMPLANTABLE DEVICE | Site: GROIN | Status: FUNCTIONAL

## 2024-08-15 DEVICE — LIFESTREAM™ BALLOON EXPANDABLE VASCULAR COVERED STENT, 7 MM X 58 MM, 135 CM CATHETER LENGTH
Type: IMPLANTABLE DEVICE | Site: GROIN | Status: FUNCTIONAL
Brand: LIFESTREAM

## 2024-08-15 RX ORDER — FENTANYL CITRATE 50 UG/ML
INJECTION, SOLUTION INTRAMUSCULAR; INTRAVENOUS AS NEEDED
Status: DISCONTINUED | OUTPATIENT
Start: 2024-08-15 | End: 2024-08-15

## 2024-08-15 RX ORDER — CEFAZOLIN 1 G/1
INJECTION, POWDER, FOR SOLUTION INTRAVENOUS AS NEEDED
Status: DISCONTINUED | OUTPATIENT
Start: 2024-08-15 | End: 2024-08-15

## 2024-08-15 RX ORDER — METOPROLOL SUCCINATE 50 MG/1
50 TABLET, EXTENDED RELEASE ORAL DAILY
Status: DISCONTINUED | OUTPATIENT
Start: 2024-08-16 | End: 2024-08-16 | Stop reason: HOSPADM

## 2024-08-15 RX ORDER — SODIUM CHLORIDE, SODIUM LACTATE, POTASSIUM CHLORIDE, CALCIUM CHLORIDE 600; 310; 30; 20 MG/100ML; MG/100ML; MG/100ML; MG/100ML
100 INJECTION, SOLUTION INTRAVENOUS CONTINUOUS
Status: DISCONTINUED | OUTPATIENT
Start: 2024-08-15 | End: 2024-08-15 | Stop reason: HOSPADM

## 2024-08-15 RX ORDER — SIMETHICONE 80 MG
40 TABLET,CHEWABLE ORAL 4 TIMES DAILY PRN
Status: DISCONTINUED | OUTPATIENT
Start: 2024-08-15 | End: 2024-08-16 | Stop reason: HOSPADM

## 2024-08-15 RX ORDER — MIDAZOLAM HYDROCHLORIDE 1 MG/ML
1 INJECTION, SOLUTION INTRAMUSCULAR; INTRAVENOUS ONCE AS NEEDED
Status: DISCONTINUED | OUTPATIENT
Start: 2024-08-15 | End: 2024-08-15 | Stop reason: HOSPADM

## 2024-08-15 RX ORDER — ACETAMINOPHEN 325 MG/1
650 TABLET ORAL EVERY 6 HOURS SCHEDULED
Status: DISCONTINUED | OUTPATIENT
Start: 2024-08-16 | End: 2024-08-16 | Stop reason: HOSPADM

## 2024-08-15 RX ORDER — MORPHINE SULFATE 2 MG/ML
2 INJECTION, SOLUTION INTRAMUSCULAR; INTRAVENOUS EVERY 5 MIN PRN
Status: DISCONTINUED | OUTPATIENT
Start: 2024-08-15 | End: 2024-08-15 | Stop reason: HOSPADM

## 2024-08-15 RX ORDER — ACETAMINOPHEN 325 MG/1
975 TABLET ORAL ONCE
Status: DISCONTINUED | OUTPATIENT
Start: 2024-08-15 | End: 2024-08-15 | Stop reason: HOSPADM

## 2024-08-15 RX ORDER — HYDRALAZINE HYDROCHLORIDE 20 MG/ML
10 INJECTION INTRAMUSCULAR; INTRAVENOUS EVERY 30 MIN PRN
Status: DISCONTINUED | OUTPATIENT
Start: 2024-08-15 | End: 2024-08-15 | Stop reason: HOSPADM

## 2024-08-15 RX ORDER — PROPOFOL 10 MG/ML
INJECTION, EMULSION INTRAVENOUS AS NEEDED
Status: DISCONTINUED | OUTPATIENT
Start: 2024-08-15 | End: 2024-08-15

## 2024-08-15 RX ORDER — CYCLOBENZAPRINE HCL 10 MG
5 TABLET ORAL 3 TIMES DAILY PRN
Status: DISCONTINUED | OUTPATIENT
Start: 2024-08-15 | End: 2024-08-16 | Stop reason: HOSPADM

## 2024-08-15 RX ORDER — LABETALOL HYDROCHLORIDE 5 MG/ML
10 INJECTION, SOLUTION INTRAVENOUS ONCE AS NEEDED
Status: DISCONTINUED | OUTPATIENT
Start: 2024-08-15 | End: 2024-08-15 | Stop reason: HOSPADM

## 2024-08-15 RX ORDER — HEPARIN SODIUM 1000 [USP'U]/ML
INJECTION, SOLUTION INTRAVENOUS; SUBCUTANEOUS AS NEEDED
Status: DISCONTINUED | OUTPATIENT
Start: 2024-08-15 | End: 2024-08-15

## 2024-08-15 RX ORDER — TAMSULOSIN HYDROCHLORIDE 0.4 MG/1
0.4 CAPSULE ORAL DAILY
Status: DISCONTINUED | OUTPATIENT
Start: 2024-08-16 | End: 2024-08-16 | Stop reason: HOSPADM

## 2024-08-15 RX ORDER — SIMVASTATIN 20 MG/1
20 TABLET, FILM COATED ORAL DAILY
Status: DISCONTINUED | OUTPATIENT
Start: 2024-08-16 | End: 2024-08-16 | Stop reason: HOSPADM

## 2024-08-15 RX ORDER — HYDROCODONE BITARTRATE AND ACETAMINOPHEN 5; 325 MG/1; MG/1
1 TABLET ORAL EVERY 4 HOURS PRN
Status: DISCONTINUED | OUTPATIENT
Start: 2024-08-15 | End: 2024-08-16 | Stop reason: HOSPADM

## 2024-08-15 RX ORDER — POTASSIUM CHLORIDE 20 MEQ/1
20 TABLET, EXTENDED RELEASE ORAL DAILY
Status: DISCONTINUED | OUTPATIENT
Start: 2024-08-16 | End: 2024-08-16 | Stop reason: HOSPADM

## 2024-08-15 RX ORDER — METOPROLOL TARTRATE 1 MG/ML
5 INJECTION, SOLUTION INTRAVENOUS ONCE
Status: DISCONTINUED | OUTPATIENT
Start: 2024-08-15 | End: 2024-08-15 | Stop reason: HOSPADM

## 2024-08-15 RX ORDER — FAMOTIDINE 10 MG/ML
20 INJECTION INTRAVENOUS ONCE
Status: DISCONTINUED | OUTPATIENT
Start: 2024-08-15 | End: 2024-08-15 | Stop reason: HOSPADM

## 2024-08-15 RX ORDER — CEFAZOLIN SODIUM 1 G/50ML
1 SOLUTION INTRAVENOUS EVERY 6 HOURS
Status: COMPLETED | OUTPATIENT
Start: 2024-08-15 | End: 2024-08-16

## 2024-08-15 RX ORDER — ALBUTEROL SULFATE 0.83 MG/ML
2.5 SOLUTION RESPIRATORY (INHALATION) ONCE AS NEEDED
Status: DISCONTINUED | OUTPATIENT
Start: 2024-08-15 | End: 2024-08-15 | Stop reason: HOSPADM

## 2024-08-15 RX ORDER — LIDOCAINE HYDROCHLORIDE 10 MG/ML
INJECTION INFILTRATION; PERINEURAL AS NEEDED
Status: DISCONTINUED | OUTPATIENT
Start: 2024-08-15 | End: 2024-08-15 | Stop reason: HOSPADM

## 2024-08-15 RX ORDER — ONDANSETRON HYDROCHLORIDE 2 MG/ML
INJECTION, SOLUTION INTRAVENOUS AS NEEDED
Status: DISCONTINUED | OUTPATIENT
Start: 2024-08-15 | End: 2024-08-15

## 2024-08-15 RX ORDER — LIDOCAINE HCL/PF 100 MG/5ML
SYRINGE (ML) INTRAVENOUS AS NEEDED
Status: DISCONTINUED | OUTPATIENT
Start: 2024-08-15 | End: 2024-08-15

## 2024-08-15 RX ORDER — ONDANSETRON HYDROCHLORIDE 2 MG/ML
4 INJECTION, SOLUTION INTRAVENOUS ONCE AS NEEDED
Status: DISCONTINUED | OUTPATIENT
Start: 2024-08-15 | End: 2024-08-15 | Stop reason: HOSPADM

## 2024-08-15 RX ORDER — HYDROMORPHONE HYDROCHLORIDE 1 MG/ML
1 INJECTION, SOLUTION INTRAMUSCULAR; INTRAVENOUS; SUBCUTANEOUS EVERY 5 MIN PRN
Status: DISCONTINUED | OUTPATIENT
Start: 2024-08-15 | End: 2024-08-15 | Stop reason: HOSPADM

## 2024-08-15 RX ORDER — MEPERIDINE HYDROCHLORIDE 25 MG/ML
12.5 INJECTION INTRAMUSCULAR; INTRAVENOUS; SUBCUTANEOUS EVERY 10 MIN PRN
Status: DISCONTINUED | OUTPATIENT
Start: 2024-08-15 | End: 2024-08-15 | Stop reason: HOSPADM

## 2024-08-15 RX ORDER — SODIUM CHLORIDE 9 MG/ML
100 INJECTION, SOLUTION INTRAVENOUS CONTINUOUS
Status: DISCONTINUED | OUTPATIENT
Start: 2024-08-15 | End: 2024-08-15

## 2024-08-15 RX ORDER — SODIUM CHLORIDE 9 MG/ML
100 INJECTION, SOLUTION INTRAVENOUS CONTINUOUS
Status: DISCONTINUED | OUTPATIENT
Start: 2024-08-15 | End: 2024-08-16 | Stop reason: HOSPADM

## 2024-08-15 RX ORDER — DIPHENHYDRAMINE HYDROCHLORIDE 50 MG/ML
25 INJECTION INTRAMUSCULAR; INTRAVENOUS ONCE AS NEEDED
Status: DISCONTINUED | OUTPATIENT
Start: 2024-08-15 | End: 2024-08-15 | Stop reason: HOSPADM

## 2024-08-15 RX ORDER — SCOLOPAMINE TRANSDERMAL SYSTEM 1 MG/1
1 PATCH, EXTENDED RELEASE TRANSDERMAL ONCE
Status: DISCONTINUED | OUTPATIENT
Start: 2024-08-15 | End: 2024-08-15 | Stop reason: HOSPADM

## 2024-08-15 RX ORDER — DEXMEDETOMIDINE HYDROCHLORIDE 100 UG/ML
INJECTION, SOLUTION INTRAVENOUS AS NEEDED
Status: DISCONTINUED | OUTPATIENT
Start: 2024-08-15 | End: 2024-08-15

## 2024-08-15 SDOH — SOCIAL STABILITY: SOCIAL INSECURITY: ABUSE: ADULT

## 2024-08-15 SDOH — SOCIAL STABILITY: SOCIAL INSECURITY: ARE YOU OR HAVE YOU BEEN THREATENED OR ABUSED PHYSICALLY, EMOTIONALLY, OR SEXUALLY BY ANYONE?: NO

## 2024-08-15 SDOH — SOCIAL STABILITY: SOCIAL INSECURITY: DOES ANYONE TRY TO KEEP YOU FROM HAVING/CONTACTING OTHER FRIENDS OR DOING THINGS OUTSIDE YOUR HOME?: NO

## 2024-08-15 SDOH — HEALTH STABILITY: MENTAL HEALTH: HOW OFTEN DO YOU HAVE 6 OR MORE DRINKS ON ONE OCCASION?: NEVER

## 2024-08-15 SDOH — SOCIAL STABILITY: SOCIAL INSECURITY: ARE THERE ANY APPARENT SIGNS OF INJURIES/BEHAVIORS THAT COULD BE RELATED TO ABUSE/NEGLECT?: NO

## 2024-08-15 SDOH — SOCIAL STABILITY: SOCIAL INSECURITY: HAS ANYONE EVER THREATENED TO HURT YOUR FAMILY OR YOUR PETS?: NO

## 2024-08-15 SDOH — ECONOMIC STABILITY: INCOME INSECURITY: IN THE LAST 12 MONTHS, WAS THERE A TIME WHEN YOU WERE NOT ABLE TO PAY THE MORTGAGE OR RENT ON TIME?: NO

## 2024-08-15 SDOH — ECONOMIC STABILITY: INCOME INSECURITY: HOW HARD IS IT FOR YOU TO PAY FOR THE VERY BASICS LIKE FOOD, HOUSING, MEDICAL CARE, AND HEATING?: NOT HARD AT ALL

## 2024-08-15 SDOH — ECONOMIC STABILITY: HOUSING INSECURITY: IN THE PAST 12 MONTHS, HOW MANY TIMES HAVE YOU MOVED WHERE YOU WERE LIVING?: 0

## 2024-08-15 SDOH — SOCIAL STABILITY: SOCIAL INSECURITY: HAVE YOU HAD ANY THOUGHTS OF HARMING ANYONE ELSE?: NO

## 2024-08-15 SDOH — HEALTH STABILITY: MENTAL HEALTH: HOW OFTEN DO YOU HAVE A DRINK CONTAINING ALCOHOL?: NEVER

## 2024-08-15 SDOH — SOCIAL STABILITY: SOCIAL INSECURITY: DO YOU FEEL ANYONE HAS EXPLOITED OR TAKEN ADVANTAGE OF YOU FINANCIALLY OR OF YOUR PERSONAL PROPERTY?: NO

## 2024-08-15 SDOH — SOCIAL STABILITY: SOCIAL INSECURITY: DO YOU FEEL UNSAFE GOING BACK TO THE PLACE WHERE YOU ARE LIVING?: NO

## 2024-08-15 SDOH — ECONOMIC STABILITY: HOUSING INSECURITY: AT ANY TIME IN THE PAST 12 MONTHS, WERE YOU HOMELESS OR LIVING IN A SHELTER (INCLUDING NOW)?: NO

## 2024-08-15 SDOH — HEALTH STABILITY: MENTAL HEALTH: CURRENT SMOKER: 0

## 2024-08-15 SDOH — SOCIAL STABILITY: SOCIAL INSECURITY: HAVE YOU HAD THOUGHTS OF HARMING ANYONE ELSE?: NO

## 2024-08-15 SDOH — HEALTH STABILITY: MENTAL HEALTH: HOW MANY STANDARD DRINKS CONTAINING ALCOHOL DO YOU HAVE ON A TYPICAL DAY?: PATIENT DOES NOT DRINK

## 2024-08-15 SDOH — SOCIAL STABILITY: SOCIAL INSECURITY: WERE YOU ABLE TO COMPLETE ALL THE BEHAVIORAL HEALTH SCREENINGS?: YES

## 2024-08-15 ASSESSMENT — PATIENT HEALTH QUESTIONNAIRE - PHQ9
SUM OF ALL RESPONSES TO PHQ9 QUESTIONS 1 & 2: 0
1. LITTLE INTEREST OR PLEASURE IN DOING THINGS: NOT AT ALL
2. FEELING DOWN, DEPRESSED OR HOPELESS: NOT AT ALL

## 2024-08-15 ASSESSMENT — COGNITIVE AND FUNCTIONAL STATUS - GENERAL
MOBILITY SCORE: 23
PATIENT BASELINE BEDBOUND: NO
DRESSING REGULAR LOWER BODY CLOTHING: A LITTLE
DAILY ACTIVITIY SCORE: 20
TOILETING: A LITTLE
CLIMB 3 TO 5 STEPS WITH RAILING: A LITTLE
DRESSING REGULAR UPPER BODY CLOTHING: A LITTLE
HELP NEEDED FOR BATHING: A LITTLE

## 2024-08-15 ASSESSMENT — PAIN SCALES - GENERAL
PAINLEVEL_OUTOF10: 0 - NO PAIN
PAINLEVEL_OUTOF10: 0 - NO PAIN
PAINLEVEL_OUTOF10: 4
PAINLEVEL_OUTOF10: 0 - NO PAIN

## 2024-08-15 ASSESSMENT — ACTIVITIES OF DAILY LIVING (ADL)
HEARING - LEFT EAR: FUNCTIONAL
GROOMING: INDEPENDENT
PATIENT'S MEMORY ADEQUATE TO SAFELY COMPLETE DAILY ACTIVITIES?: YES
HEARING - RIGHT EAR: FUNCTIONAL
TOILETING: INDEPENDENT
DRESSING YOURSELF: INDEPENDENT
BATHING: INDEPENDENT
ADEQUATE_TO_COMPLETE_ADL: YES
WALKS IN HOME: INDEPENDENT
FEEDING YOURSELF: INDEPENDENT
JUDGMENT_ADEQUATE_SAFELY_COMPLETE_DAILY_ACTIVITIES: YES

## 2024-08-15 ASSESSMENT — LIFESTYLE VARIABLES
AUDIT-C TOTAL SCORE: 0
HOW OFTEN DO YOU HAVE A DRINK CONTAINING ALCOHOL: NEVER
AUDIT-C TOTAL SCORE: 0
HOW OFTEN DO YOU HAVE 6 OR MORE DRINKS ON ONE OCCASION: NEVER
AUDIT-C TOTAL SCORE: 0
HOW MANY STANDARD DRINKS CONTAINING ALCOHOL DO YOU HAVE ON A TYPICAL DAY: PATIENT DOES NOT DRINK
SKIP TO QUESTIONS 9-10: 1
SKIP TO QUESTIONS 9-10: 1

## 2024-08-15 ASSESSMENT — PAIN - FUNCTIONAL ASSESSMENT
PAIN_FUNCTIONAL_ASSESSMENT: 0-10

## 2024-08-15 ASSESSMENT — PAIN DESCRIPTION - DESCRIPTORS: DESCRIPTORS: ACHING

## 2024-08-15 NOTE — ANESTHESIA PROCEDURE NOTES
Airway  Date/Time: 8/15/2024 2:23 PM  Urgency: elective    Airway not difficult    Staffing  Performed: CRNA   Authorized by: Aric El MD    Performed by: GOOD Camarena-MACO  Patient location during procedure: OR    Indications and Patient Condition  Indications for airway management: anesthesia  Spontaneous ventilation: present  Sedation level: deep  Preoxygenated: yes  Patient position: sniffing  Mask difficulty assessment: 0 - not attempted  Planned trial extubation    Final Airway Details  Final airway type: supraglottic airway      Successful airway: Size 4     Number of attempts at approach: 1

## 2024-08-15 NOTE — ANESTHESIA PREPROCEDURE EVALUATION
Patient: Kti Franklin    Procedure Information       Date/Time: 08/15/24 1300    Procedure: AORTOBIFEMORAL ARTERIOGRAM WITH POSSIBLE INTERVENTION RIGHT LEG WITH C-ARM (Right) - Aortobifemoral arteriogram with possible intervention right leg    Location: [facility] OR 12 / [facility]    Surgeons: Gilles Brown, DO            Relevant Problems   Anesthesia   (-) Difficult intubation   (-) PONV (postoperative nausea and vomiting)      Cardiac   (+) Benign essential HTN   (+) Hyperlipemia   (+) PAD (peripheral artery disease) (CMS-HCC)   (+) RBBB   (+) Systolic murmur      Neuro   (+) Lumbar radiculopathy      GI   (+) Irritable bowel syndrome with diarrhea      Endocrine   (+) Obesity      Musculoskeletal   (+) Facet degeneration of lumbar region   (+) Osteoarthritis   (+) Primary osteoarthritis of right hip      HEENT   (+) Bilateral hearing loss      ID   (+) COVID-19       Clinical information reviewed:   Tobacco  Allergies  Meds   Med Hx  Surg Hx   Fam Hx  Soc Hx        NPO Detail:  NPO/Void Status  Date of Last Liquid: 08/15/24  Time of Last Liquid: 0600  Date of Last Solid: 08/14/24  Time of Last Solid: 1900  Time of Last Void: 1205         Physical Exam    Airway  Mallampati: II  TM distance: >3 FB  Neck ROM: full     Cardiovascular - normal exam  Rhythm: regular  Rate: normal     Dental - normal exam     Pulmonary - normal exam     Abdominal            Anesthesia Plan    History of general anesthesia?: yes  History of complications of general anesthesia?: no    ASA 3     MAC     The patient is not a current smoker.  Education provided regarding risk of obstructive sleep apnea.  intravenous induction   Postoperative administration of opioids is intended.  Trial extubation is planned.  Anesthetic plan and risks discussed with patient.    Plan discussed with CRNA, CAA and attending.

## 2024-08-15 NOTE — ANESTHESIA POSTPROCEDURE EVALUATION
Patient: Kit Franklin    Procedure Summary       Date: 08/15/24 Room / Location: [facility] OR 12 / [facility]    Anesthesia Start: 1309 Anesthesia Stop:     Procedure: AORTOBIFEMORAL ARTERIOGRAM RIGHT LEG WITH C-ARM / CONVERTED TO OPEN FEMORAL ENDARTERCECTOMY WITH PATCH ANGIOPLASTY / TRANSLUMINAL BALLOON ANGIOPLASTY WITH PLACEMENT OF 7 MM X 5.8 CM BALLOON EXPAND COMPLETION OF ANGIOGRAM (Right: Groin) Diagnosis:       PAD (peripheral artery disease) (CMS-HCC)      (PAD (peripheral artery disease) (CMS-HCC) [I73.9])    Surgeons: Gilles Brown DO Responsible Provider: Ron West MD    Anesthesia Type: general ASA Status: 3            Anesthesia Type: general    Vitals Value Taken Time   /79 08/15/24 1735   Temp 36.2 08/15/24 1735   Pulse 76 08/15/24 1734   Resp 20 08/15/24 1735   SpO2 100 % 08/15/24 1734   Vitals shown include unfiled device data.    Anesthesia Post Evaluation    Patient location during evaluation: PACU  Patient participation: complete - patient participated  Level of consciousness: sleepy but conscious  Pain management: adequate  Airway patency: patent  Cardiovascular status: acceptable  Respiratory status: acceptable  Hydration status: acceptable  Postoperative Nausea and Vomiting: none        There were no known notable events for this encounter.

## 2024-08-15 NOTE — INTERVAL H&P NOTE
H&P reviewed. The patient was examined and there are no changes to the H&P.Ischemic right foot with non healing heel ulcer

## 2024-08-16 VITALS
DIASTOLIC BLOOD PRESSURE: 76 MMHG | TEMPERATURE: 97.3 F | OXYGEN SATURATION: 97 % | WEIGHT: 205.03 LBS | BODY MASS INDEX: 31.07 KG/M2 | HEART RATE: 98 BPM | SYSTOLIC BLOOD PRESSURE: 142 MMHG | HEIGHT: 68 IN | RESPIRATION RATE: 18 BRPM

## 2024-08-16 LAB
ANION GAP SERPL CALC-SCNC: 11 MMOL/L (ref 10–20)
BUN SERPL-MCNC: 12 MG/DL (ref 6–23)
CALCIUM SERPL-MCNC: 9.6 MG/DL (ref 8.6–10.3)
CHLORIDE SERPL-SCNC: 104 MMOL/L (ref 98–107)
CO2 SERPL-SCNC: 26 MMOL/L (ref 21–32)
CREAT SERPL-MCNC: 0.8 MG/DL (ref 0.5–1.3)
EGFRCR SERPLBLD CKD-EPI 2021: >90 ML/MIN/1.73M*2
ERYTHROCYTE [DISTWIDTH] IN BLOOD BY AUTOMATED COUNT: 14.9 % (ref 11.5–14.5)
GLUCOSE SERPL-MCNC: 114 MG/DL (ref 74–99)
HCT VFR BLD AUTO: 38.6 % (ref 41–52)
HGB BLD-MCNC: 12.1 G/DL (ref 13.5–17.5)
MCH RBC QN AUTO: 29.2 PG (ref 26–34)
MCHC RBC AUTO-ENTMCNC: 31.3 G/DL (ref 32–36)
MCV RBC AUTO: 93 FL (ref 80–100)
NRBC BLD-RTO: 0 /100 WBCS (ref 0–0)
PLATELET # BLD AUTO: 185 X10*3/UL (ref 150–450)
POTASSIUM SERPL-SCNC: 3.8 MMOL/L (ref 3.5–5.3)
RBC # BLD AUTO: 4.15 X10*6/UL (ref 4.5–5.9)
SODIUM SERPL-SCNC: 137 MMOL/L (ref 136–145)
WBC # BLD AUTO: 7.2 X10*3/UL (ref 4.4–11.3)

## 2024-08-16 PROCEDURE — 99024 POSTOP FOLLOW-UP VISIT: CPT | Performed by: SURGERY

## 2024-08-16 PROCEDURE — 36415 COLL VENOUS BLD VENIPUNCTURE: CPT | Performed by: STUDENT IN AN ORGANIZED HEALTH CARE EDUCATION/TRAINING PROGRAM

## 2024-08-16 PROCEDURE — 2500000004 HC RX 250 GENERAL PHARMACY W/ HCPCS (ALT 636 FOR OP/ED): Mod: JZ | Performed by: SURGERY

## 2024-08-16 PROCEDURE — 2500000005 HC RX 250 GENERAL PHARMACY W/O HCPCS

## 2024-08-16 PROCEDURE — 85027 COMPLETE CBC AUTOMATED: CPT | Performed by: STUDENT IN AN ORGANIZED HEALTH CARE EDUCATION/TRAINING PROGRAM

## 2024-08-16 PROCEDURE — 2500000004 HC RX 250 GENERAL PHARMACY W/ HCPCS (ALT 636 FOR OP/ED): Performed by: STUDENT IN AN ORGANIZED HEALTH CARE EDUCATION/TRAINING PROGRAM

## 2024-08-16 PROCEDURE — 2500000001 HC RX 250 WO HCPCS SELF ADMINISTERED DRUGS (ALT 637 FOR MEDICARE OP): Performed by: SURGERY

## 2024-08-16 PROCEDURE — 2500000002 HC RX 250 W HCPCS SELF ADMINISTERED DRUGS (ALT 637 FOR MEDICARE OP, ALT 636 FOR OP/ED): Performed by: SURGERY

## 2024-08-16 PROCEDURE — 80048 BASIC METABOLIC PNL TOTAL CA: CPT | Performed by: STUDENT IN AN ORGANIZED HEALTH CARE EDUCATION/TRAINING PROGRAM

## 2024-08-16 PROCEDURE — 99221 1ST HOSP IP/OBS SF/LOW 40: CPT | Performed by: STUDENT IN AN ORGANIZED HEALTH CARE EDUCATION/TRAINING PROGRAM

## 2024-08-16 RX ORDER — ASPIRIN 81 MG/1
81 TABLET ORAL DAILY
Status: DISCONTINUED | OUTPATIENT
Start: 2024-08-16 | End: 2024-08-16 | Stop reason: HOSPADM

## 2024-08-16 RX ORDER — ASPIRIN 81 MG/1
81 TABLET ORAL DAILY
Qty: 30 TABLET | Refills: 3 | Status: SHIPPED | OUTPATIENT
Start: 2024-08-16

## 2024-08-16 RX ORDER — METOPROLOL TARTRATE 1 MG/ML
10 INJECTION, SOLUTION INTRAVENOUS ONCE
Status: COMPLETED | OUTPATIENT
Start: 2024-08-16 | End: 2024-08-16

## 2024-08-16 RX ORDER — ENOXAPARIN SODIUM 100 MG/ML
40 INJECTION SUBCUTANEOUS EVERY 24 HOURS
Status: DISCONTINUED | OUTPATIENT
Start: 2024-08-16 | End: 2024-08-16 | Stop reason: HOSPADM

## 2024-08-16 ASSESSMENT — PAIN SCALES - GENERAL
PAINLEVEL_OUTOF10: 0 - NO PAIN
PAINLEVEL_OUTOF10: 0 - NO PAIN

## 2024-08-16 NOTE — NURSING NOTE
RN spoke with Dr. Brown on the phone and was ordered to change R groin dressing and to educate pt on no strenuous activity but he is OK to walk with cane and when he is discharged home elevate R leg higher than heart. Dressing change and education complete.

## 2024-08-16 NOTE — OP NOTE
AORTOBIFEMORAL ARTERIOGRAM RIGHT LEG WITH C-ARM / CONVERTED TO OPEN FEMORAL ENDARTERCECTOMY WITH PATCH ANGIOPLASTY / TRANSLUMINAL BALLOON ANGIOPLASTY WITH PLACEMENT OF 7 MM X 5.8 CM BALLOON EXPAND COMPLETION OF ANGIOGRAM (R) Operative Note     Date: 8/15/2024  OR Location: [facility]    Name: Kit Franklin, : 1945, Age: 78 y.o., MRN: [id number], Sex: male    Diagnosis  Pre-op Diagnosis      * PAD (peripheral artery disease) (CMS-HCC) [I73.9] Post-op Diagnosis     * PAD (peripheral artery disease) (CMS-[facility]) [I73.9]     Procedures  AORTOBIFEMORAL ARTERIOGRAM RIGHT LEG WITH C-ARM / CONVERTED TO OPEN FEMORAL ENDARTERCECTOMY WITH PATCH ANGIOPLASTY / TRANSLUMINAL BALLOON ANGIOPLASTY WITH PLACEMENT OF 7 MM X 5.8 CM BALLOON EXPAND COMPLETION OF ANGIOGRAM  [id number] - CHG ANGIOGRAPHY EXTREMITY UNILATERAL RS&I      Surgeons      * Gilles Brown - Primary    Resident/Fellow/Other Assistant:  Surgeons and Role:  * No surgeons found with a matching role *    Procedure Summary  Anesthesia: General  ASA: III  Anesthesia Staff: Anesthesiologist: Aric El MD; Ron West MD  CRNA: Chasity Mayorga APRN-CRNA; GOOD Camarena-CRNA  Estimated Blood Loss: 150mL  Intra-op Medications: * Intraprocedure medication information is unavailable because the case start and end events have not been set *           Anesthesia Record               Intraprocedure I/O Totals          Intake    LR bolus 500.00 mL    sodium chloride 0.9% infusion 1000.00 mL    Total Intake 1500 mL       Output    Est. Blood Loss 150 mL    Total Output 150 mL       Net    Net Volume 1350 mL          Specimen: No specimens collected     Staff:   Circulator: Bree  Scrub Person: Fly Dhillon Scrub: Feliz Dhillon Circulator: Rachel  Scrub Person: Lolita Dhillon Circulator: Sarahi         Drains and/or Catheters: * None in log *    Tourniquet Times:         Implants:  Implants       Type Name Action Serial No.      Implant GRAFT, XENOSURE,  BIOLOGIC PATCH, 0.8CM X 8CM - [id number] Implanted      Stent STENT, BALLOON, EXPANDABLE VASCULAR, COVERED, 7MM X 58MM, 135CM SHAFT - [id number] Implanted               Findings:     Indications: Kit Franklin is an 78 y.o. male who is having surgery for PAD (peripheral artery disease) (CMS-[facility]) [I73.9].     The patient was seen in the preoperative area. The risks, benefits, complications, treatment options, non-operative alternatives, expected recovery and outcomes were discussed with the patient. The possibilities of reaction to medication, pulmonary aspiration, injury to surrounding structures, bleeding, recurrent infection, the need for additional procedures, failure to diagnose a condition, and creating a complication requiring transfusion or operation were discussed with the patient. The patient concurred with the proposed plan, giving informed consent.  The site of surgery was properly noted/marked if necessary per policy. The patient has been actively warmed in preoperative area. Preoperative antibiotics have been ordered and given within 1 hours of incision. Venous thrombosis prophylaxis have been ordered including unilateral sequential compression device    Procedure Details: After the procedure was fully explained to the patient after adequate consent form was signed, the patient had an IV and was taken the operating room placed in the supine position and given sedation.  The entire abdomen pelvis and lower extremities down to the knees was cleaned prepped usual sterile fashion.  Using ultrasound guidance, the skin subcutaneous tissue overlying the left common femoral artery was anesthetized.  Using ultrasound guidance, the left common femoral artery was cannulated with an angio access needle.  A guidewire is advanced into the left iliac system and this is seen under fluoroscopy.  A small transverse incision was made at the exit site of the guidewire.  A dilator and sheath was advanced over the guidewire  and into the artery.  This was a 5 Amharic dilator and sheath.  An angled Glidewire was advanced into the infrarenal aorta.  A pigtail catheter was then advanced over the guidewire and advanced to the infrarenal aorta.  The guidewire was removed.  An aortobiiliac arteriogram was then performed.  This did identify 2 areas of moderate to significant stenosis in the right common iliac artery.  At this time, attention was then given to the right femoral region.  Using ultrasound guidance, the skin subcutaneous tissue overlying the right common femoral artery was anesthetized.  Under ultrasound guidance, the right proximal common femoral artery was cannulated with an angio access needle.  A guidewire was then advanced into the right iliac system and this is seen under fluoroscopy.  A micro dilator and sheath was advanced over the guidewire and into the artery.  The guidewire was removed along with the dilator.  A retrograde arteriogram was then performed which again identified a significant stenosis x 2 in the right common iliac artery.  A right femoral arteriogram was performed which identified a segment of occlusion in the right common femoral artery in the mid to distal portion.  The origin of the profunda artery and superficial femoral artery were widely patent he has seen from collaterals.  At this time, I felt that the best option was to do an open endarterectomy of his right common femoral artery.  The micro sheath was then removed and pressure was held over the site for 10 minutes.  At this time, the patient was given a general anesthetic agent by anesthesia and had LMA for ventilation.  The patient was also given 2 g of Ancef.  I did break scrub and discussed the situation with the wife who agreed to proceed with the recommendation of a right common femoral open endarterectomy.  At this time, the skin subcutaneous tissue overlying the right common femoral artery was not anesthetized.  A longitudinal incision was  made.  Dissection is carried down through subcutaneous tissue and through the fascia.  The artery was significantly adhered to adjacent tissue from inflammation.  Dissection continued in a cephalad direction all the way to the external iliac artery where the artery was found to have a pulse and was soft on palpation.  This was circumferentially dissected free and controlled using Vesseloops.  There were 2 branches located in this area that were circumferentially dissected free and controlled using Vesseloops.  Meticulous dissection continued to be done involving the common femoral artery distally.  The origin of the profunda artery was identified circumferentially dissected free and controlled using a vessel loop.  The origin of the superficial femoral artery was circumferentially dissected free and controlled using a vessel loop.  The patient was given 6000 units of heparin.  After adequate heparinization time, the external iliac artery was clamped using a vascular clamp.  The origin of the superficial femoral artery and profunda artery were also temporarily clamped.  Using a #11 blade, an arteriotomy was made in the distal common femoral artery through a very high-grade stenosis.  There was a mature clot that was also removed.  At this time, an endarterectomy was performed.  Only partial endarterectomy could be performed because of the adherence of the plaque to the wall.  At this time, I felt risk outweighed benefit to do a complete endarterectomy.  The arteriotomy was extended further to the origin of the superficial femoral artery.  A bovine pericardial patch was then cut to appropriate size shape and anastomose to the defect using a running suture of 5-0 Prolene.  Inflow was established.  After 10 heartbeats the branch clamps were removed.  After 10 heartbeats the profunda artery clamp was removed.  And after 10 heartbeats the superficial femoral artery clamp was removed.  There were 2 areas of bleeding along  the suture line that were repaired using interrupted sutures of 6-0 Prolene.Hemostasis was achieved.  At this time, an angio access needle was then used to cannulate through the patch.  A guidewire was then advanced into the iliac system and this is seen under fluoroscopy.  A 7 Cameroonian dilator and sheath was advanced over the guidewire and into the artery.  The dilator was removed.  The sheath was flushed heparinized saline.  A completion arteriogram showed excellent results with the endarterectomy and patch angioplasty of the common femoral artery.  There was nonobstructive flow going into the superficial femoral artery and profunda artery.  An arteriogram was then also performed involving the remainder of the right lower extremity which identified a three-vessel runoff and no areas of significant stenosis.  Again, a retrograde arteriogram was performed which clearly identified the 2 areas of stenosis in the right common iliac artery.  A 7 mm x 5.8 cm balloon expandable covered stent was advanced over the guidewire and advanced to the area of stenosis and fully deployed.  A retrograde arteriogram was performed which showed excellent flow through the stent.  A 9 mm x 4 cm noncompliant balloon was then advanced over the guidewire and advanced to the proximal portion of the stent and inflated to flare the stent for better coaptation.  The balloon was also pulled back to the distal portion of the stent and carefully inflated to dilate this portion of the stent for better wall coaptation.  A repeat arteriogram showed excellent results.  At this time, I was satisfied with the results.  The guidewire was removed.  The sheath was slowly pulled back while clamping the external iliac artery.  The superficial femoral artery and profunda arteries were also temporarily clamped.  The sheath was then removed.  The access site defect was then closed using interrupted sutures of 6-0 Prolene.  Inflow was then established.  After 10  heartbeats the profunda clamp was removed and after another 10 heartbeats the superficial femoral artery clamp was removed.  The patient now had triphasic signals in the common femoral artery superficial femoral artery and the profunda artery.  Hemostasis was achieved using thrombin-soaked Gelfoam.  After hemostasis was achieved, the wound was copiously irrigated with antibiotic solution.  The deep layer was then closed over the artery with a running suture of 3-0 Vicryl.  The subcutaneous tissue layer was closed in 2 separate layers using running sutures of 3-0 Vicryl.  The skin was then closed using a running subcuticular stitch of 4-0 Vicryl.  Dermabond was applied.  After the glue dried, Telfa Tegaderm was applied.  Attention was then given to the 5 Latvian sheath involving the left groin.  A left femoral arteriogram was performed which showed the access site to be in the common femoral artery.  A StarClose was then used in standard fashion to close the stick site on the left.  Pressure was held over the site for 10 minutes.  No significant bleeding or hematoma was identified.  Dermabond was then used to seal the stick site on the skin.  The patient tolerated the procedure well.  No complications noted.  Instrument count needle count sponge counts correct.  Thank you very much this ends dictation  Complications:  None; patient tolerated the procedure well.    Disposition: PACU - hemodynamically stable.  Condition: stable         Additional Details:     Attending Attestation: I was present and scrubbed for the entire procedure.    Gilles Brown  Phone Number: [phone]

## 2024-08-16 NOTE — CONSULTS
Reason For Consult  mm    History Of Present Illness  Kit Franklin is a 78 y.o. male presenting with right leg pain. Was taken to OR by Dr. Brown for oclussion of right common femoral. Every starrted post hip replacement which had severe leg pain and pressure ucler on his leg to which he was seeing wound care. Post op day 1 and is doing well. Leg is swollen but warm with no issues     Past Medical History  He has a past medical history of Arthritis, HL (hearing loss), Hyperlipidemia, Hypertension, Joint pain, Nephrolithiasis, Personal history of other diseases of the circulatory system (03/09/2021), Personal history of other diseases of the digestive system (03/09/2021), Personal history of other diseases of the musculoskeletal system and connective tissue (03/09/2021), Personal history of other diseases of the musculoskeletal system and connective tissue (03/09/2021), and Personal history of other endocrine, nutritional and metabolic disease (03/09/2021).    Surgical History  He has a past surgical history that includes Other surgical history (2008); Other surgical history (2019); Other surgical history (03/09/2021); Other surgical history (03/09/2021); Shoulder surgery (Left, 2013); Other surgical history (2015 Right; 2017 Left); Total hip arthroplasty (Right, 06/2024); Colonoscopy; Cervical discectomy; Cataract extraction; Hip Arthroplasty; Knee Arthroplasty; and Rotator cuff repair.     Social History  He reports that he quit smoking about 17 years ago. His smoking use included cigarettes. He has been exposed to tobacco smoke. He has never used smokeless tobacco. He reports current alcohol use of about 1.0 - 2.0 standard drink of alcohol per week. He reports that he does not use drugs.    Family History  Family History   Problem Relation Name Age of Onset    Dementia Mother      Hypertension Mother      Other (Senile dementia) Mother      Dementia Father      Diabetes Father      Hypertension Father      Mitral  "valve prolapse Father      Heart attack Father      Other (Senile dementia) Father      Diabetes Brother          Allergies  Lisinopril and Meperidine    Review of Systems  12 ros negative except per above     Physical Exam  Gen: no acute distress  ENT: normal trachea  CV: RRR  Resp: ctab  Gi: soft nontender  Ex: right leg pitting edema +3, warm  Pusles: pulses present  Skin: nomral   Pysch: normal affect     Last Recorded Vitals  Blood pressure 139/67, pulse 91, temperature 36.3 °C (97.3 °F), temperature source Temporal, resp. rate 18, height 1.727 m (5' 7.99\"), weight 93 kg (205 lb 0.4 oz), SpO2 99%.    Relevant Results       Assessment/Plan     #OA  #PAD  #HTN  #Leg swelling    Post op course going well  Pian controlled  Labs normal  Pt/ot  Hopefully be able to dc soon back home  Dvt ppx    I spent 75 minutes in the professional and overall care of this patient.      Morro Merrill, DO    "

## 2024-08-16 NOTE — PROGRESS NOTES
08/16/24 1247   Discharge Planning   Living Arrangements Spouse/significant other;Family members   Support Systems Spouse/significant other;Family members   Assistance Needed Independent, uses cane as needed. Wife assists with transportation   Type of Residence Private residence   Home or Post Acute Services None   Expected Discharge Disposition Home     Met with patient, patients brother and patients wife at bedside, introduced self and role on care transitions team. Patient provided permission for TCC to speak with family present. Admission assessment completed. Address, insurance and contact information verified. Patient preference is to return home at discharge. Patient has declined any home going needs.  PCP: Dr. Elias Morley  Pharmacy: [facility] [address] in [place]. Patient able to afford and obtain his home medications.

## 2024-08-16 NOTE — PROGRESS NOTES
"Kit Franklin is a 78 y.o. male on day 1 of admission presenting with PAD (peripheral artery disease) (CMS-HCC).    Subjective   This patient was seen at bedside.  He is status post right femoral endarterectomy with patch angioplasty and placement of a balloon expandable covered stent right common iliac artery.  He states that his right leg is significantly better.  He is able to ambulate better than preop and also states that he no longer has pain in his right leg while sitting.  He denies any fever chills nausea vomiting or headache.  He is tolerating his diet.  He is urinating well.  He did have bowel movements.           Objective     Vitals:    08/16/24 1519   BP: 142/76   Pulse: 98   Resp: 18   Temp: 36.3 °C (97.3 °F)   SpO2: 97%      Physical Exam  This patient is alert and oriented x 3.  He is in no acute distress.  Head is normocephalic.  His right femoral incision is intact.  He does have mild swelling.  His left femoral stick site is intact and soft.  His right foot has excellent Doppler signals and his right foot is warm to touch.  Blood pressure 142/76, pulse 98, temperature 36.3 °C (97.3 °F), temperature source Temporal, resp. rate 18, height 1.727 m (5' 7.99\"), weight 93 kg (205 lb 0.4 oz), SpO2 97%.        Intake/Output last 3 Shifts:  I/O last 3 completed shifts:  In: 2400 (25.8 mL/kg) [I.V.:1800 (19.4 mL/kg); IV Piggyback:600]  Out: 650 (7 mL/kg) [Urine:500 (0.1 mL/kg/hr); Blood:150]  Weight: 93 kg     Patient Active Problem List    Diagnosis Date Noted    PAD (peripheral artery disease) (CMS-HCC) 08/08/2024    Sepsis (Multi) 06/08/2024    Weakness generalized 06/07/2024    Primary osteoarthritis of right hip 06/04/2024    Osteoarthritis 02/01/2023    Pancreatic cyst ([facility]) 02/01/2023    Polyarthropathy 02/01/2023    Psoriasis 02/01/2023    RBBB 02/01/2023    Right shoulder pain 02/01/2023    Rotator cuff injury 02/01/2023    Scalp lesion 02/01/2023    Sigmoid volvulus (Multi) 02/01/2023    " Systolic murmur 02/01/2023    Weakness of shoulder 02/01/2023    Arthralgia of hip, right 01/31/2023    Arthritis of right hip 01/31/2023    Benign essential HTN 01/31/2023    Bilateral hearing loss 01/31/2023    Bilateral impacted cerumen 01/31/2023    Chronic back pain 01/31/2023    Complaints of leg weakness 01/31/2023    Concentration deficit 01/31/2023    COVID-19 01/31/2023    Diarrhea 01/31/2023    Dysuria 01/31/2023    Facet degeneration of lumbar region 01/31/2023    Right hip pain 01/31/2023    History of claustrophobia 01/31/2023    Hyperlipemia 01/31/2023    Irritable bowel syndrome with diarrhea 01/31/2023    Lipoma 01/31/2023    Lumbar radiculopathy 01/31/2023    Numbness 01/31/2023    Obesity 01/31/2023          Current Facility-Administered Medications:     acetaminophen (Tylenol) tablet 650 mg, 650 mg, oral, q6h RENA, Gilles Brown DO, 650 mg at 08/16/24 0311    aspirin EC tablet 81 mg, 81 mg, oral, Daily, Gilles Brown DO, 81 mg at 08/16/24 1656    cyclobenzaprine (Flexeril) tablet 5 mg, 5 mg, oral, TID PRN, Gilles Brown DO    enoxaparin (Lovenox) syringe 40 mg, 40 mg, subcutaneous, q24h, Morro Merrill, DO, 40 mg at 08/16/24 1442    HYDROcodone-acetaminophen (Norco) 5-325 mg per tablet 1 tablet, 1 tablet, oral, q4h PRN, Gilles Brown DO    metoprolol succinate XL (Toprol-XL) 24 hr tablet 50 mg, 50 mg, oral, Daily, Gilles Brown DO, 50 mg at 08/16/24 0924    potassium chloride CR (Klor-Con M20) ER tablet 20 mEq, 20 mEq, oral, Daily, Gilles Brown DO, 20 mEq at 08/16/24 1330    simethicone (Mylicon) chewable tablet 40 mg, 40 mg, oral, 4x daily PRN, Gilles Brown DO    simvastatin (Zocor) tablet 20 mg, 20 mg, oral, Daily, Gilels Brown DO, 20 mg at 08/16/24 0925    sodium chloride 0.9% infusion, 100 mL/hr, intravenous, Continuous, Gilles Brown DO, Last Rate: 100 mL/hr at 08/16/24 0254, 100 mL/hr at 08/16/24 0254    tamsulosin (Flomax) 24 hr capsule 0.4 mg, 0.4 mg, oral, Daily,  Gilles Brown DO, 0.4 mg at 08/16/24 0925     Lab Results   Component Value Date    WBC 7.2 08/16/2024    HGB 12.1 (L) 08/16/2024    HCT 38.6 (L) 08/16/2024     08/16/2024    CHOL 155 11/21/2023    TRIG 126 11/21/2023    HDL 55.5 11/21/2023    ALT 13 07/11/2024    AST 14 07/11/2024     08/16/2024    K 3.8 08/16/2024     08/16/2024    CREATININE 0.80 08/16/2024    BUN 12 08/16/2024    CO2 26 08/16/2024    TSH 2.24 06/07/2024    PSA 0.17 08/21/2020    INR 1.0 05/21/2024    HGBA1C 5.6 11/21/2023       FL less than 1 hour    Result Date: 8/15/2024  These images are not reportable by radiology and will not be interpreted by  Radiologists.                Assessment/Plan   Principal Problem:    PAD (peripheral artery disease) (CMS-HCC)      This patient was seen and evaluated at bedside.  He is status post op day 1 for right femoral endarterectomy and patch angioplasty.  I also placed a balloon expandable covered stent right common iliac artery.  He is doing very well.  He is able to ambulate better than prior to surgery and he no longer has pain in the right leg while sitting.  He is tolerating his right surgical incision pain.  His incision is intact.  His left femoral stick site is without issue.  He is urinating well and did have bowel movements.  He is tolerating his diet.  At this time, the patient can be discharged.  I told him no strenuous activity and to elevate his right leg above his heart is much as possible.  He is to not sit in the chair with his right leg down for any lengthy period of time.  He will follow-up in my office in 10 to 14 days      Gilles Brown DO

## 2024-08-17 LAB
ATRIAL RATE: 79 BPM
P AXIS: 8 DEGREES
P OFFSET: 206 MS
P ONSET: 152 MS
PR INTERVAL: 142 MS
Q ONSET: 223 MS
QRS COUNT: 13 BEATS
QRS DURATION: 128 MS
QT INTERVAL: 396 MS
QTC CALCULATION(BAZETT): 454 MS
QTC FREDERICIA: 434 MS
R AXIS: 83 DEGREES
T AXIS: 19 DEGREES
T OFFSET: 421 MS
VENTRICULAR RATE: 79 BPM

## 2024-08-18 NOTE — DISCHARGE SUMMARY
Discharge Diagnosis  PAD (peripheral artery disease) (CMS-[facility])    Issues Requiring Follow-Up  Right common femoral endarterectomy with patch angioplasty.  Transluminal balloon angioplasty and placement of stent right common iliac artery    Test Results Pending At Discharge  Pending Labs       No current pending labs.            Hospital Course   The patient was taken to the operating room on 8/15/2024 and had a right femoral endarterectomy with patch angioplasty along with transluminal angioplasty and placement of a covered stent right common iliac artery.  The patient tolerated the procedure very well.  He was then able to be transferred to the surgical floor.  Internal medicine was consulted for medical care.  After surgery, the patient was found to have significant perfusion to his right foot compared to before surgery.  The patient was then reevaluated on postop day 1.  He was found to have significant improvement in arterial flow to his right foot.  His right femoral incision was found to be intact without issue.  The patient did receive preoperative IV antibiotics and postoperative IV antibiotics.  The patient was tolerating his diet well.  He was also starting to ambulate better prior to surgery.  The patient was found to be stable for discharge.  He denies any fever chills nausea vomiting or headache.  He was urinating well and did have a bowel movement post operatively.    Pertinent Physical Exam At Time of Discharge  Physical Exam    Home Medications     Medication List      START taking these medications     aspirin 81 mg EC tablet; Take 1 tablet (81 mg) by mouth once daily.     CONTINUE taking these medications     acetaminophen 325 mg tablet; Commonly known as: Tylenol; Take 2 tablets   (650 mg) by mouth every 6 hours.   amoxicillin-pot clavulanate 875-125 mg tablet; Commonly known as:   Augmentin   cyclobenzaprine 5 mg tablet; Commonly known as: Flexeril   furosemide 20 mg tablet; Commonly known as:  Lasix; Take 1-2 tablets   (20-40 mg) by mouth once daily.   metoprolol succinate XL 50 mg 24 hr tablet; Commonly known as:   Toprol-XL; Take 1 tablet (50 mg) by mouth once daily.   multivitamin with minerals tablet   potassium chloride CR 10 mEq ER tablet; Commonly known as: Klor-Con;   Take 2 tablets (20 mEq) by mouth once daily. Do not crush or chew.   simethicone 80 mg chewable tablet; Commonly known as: Mylicon; Chew 0.5   tablets (40 mg) 4 times a day as needed for flatulence.   simvastatin 20 mg tablet; Commonly known as: Zocor; Take 1 tablet (20   mg) by mouth once daily.   tamsulosin 0.4 mg 24 hr capsule; Commonly known as: Flomax; Take 1   capsule (0.4 mg) by mouth once daily.       Outpatient Follow-Up  Future Appointments   Date Time Provider Department [place]   8/20/2024 10:00 AM Woundcare Provider [id number] [place]   8/20/2024 11:30 AM PAR MAC 3 VASC LAB [id number] MAC [id number]   8/20/2024  2:30 PM Tatyana Remy PTA [id number] [place]   8/22/2024 11:15 AM Deepak Byrne, PT [id number] [place]   8/27/2024  4:00 PM Deepak Byrne, PT [id number] [place]   8/29/2024 10:45 AM Tatyana Remy PTA [id number] [place]   9/4/2024 10:30 AM Deepak Byrne, PT [id number] [place]   9/9/2024  1:00 PM Tyler Kat MD [id number] [place]   9/9/2024  2:30 PM Deepakmarcy Byrne, PT [id number] [place]   9/12/2024  9:45 AM Deepak Byrne, PT [id number] [place]   11/14/2024  9:30 AM Elias Morley DO [id number] [place]   1/15/2025 11:30 AM Imelda Pierre DO [id number] [place]       Gilles Brown DO

## 2024-08-20 ENCOUNTER — APPOINTMENT (OUTPATIENT)
Dept: VASCULAR MEDICINE | Facility: CLINIC | Age: 79
End: 2024-08-20
Payer: MEDICARE

## 2024-08-20 ENCOUNTER — APPOINTMENT (OUTPATIENT)
Dept: PHYSICAL THERAPY | Facility: CLINIC | Age: 79
End: 2024-08-20
Payer: MEDICARE

## 2024-08-20 ENCOUNTER — OFFICE VISIT (OUTPATIENT)
Dept: WOUND CARE | Facility: CLINIC | Age: 79
End: 2024-08-20
Payer: MEDICARE

## 2024-08-20 PROCEDURE — 11042 DBRDMT SUBQ TIS 1ST 20SQCM/<: CPT | Performed by: SURGERY

## 2024-08-20 PROCEDURE — 11042 DBRDMT SUBQ TIS 1ST 20SQCM/<: CPT

## 2024-08-22 ENCOUNTER — APPOINTMENT (OUTPATIENT)
Dept: PHYSICAL THERAPY | Facility: CLINIC | Age: 79
End: 2024-08-22
Payer: MEDICARE

## 2024-08-23 ENCOUNTER — HOSPITAL ENCOUNTER (OUTPATIENT)
Dept: VASCULAR MEDICINE | Facility: HOSPITAL | Age: 79
Discharge: HOME | End: 2024-08-23
Payer: MEDICARE

## 2024-08-23 DIAGNOSIS — I73.9 PERIPHERAL VASCULAR DISEASE, UNSPECIFIED (CMS-HCC): ICD-10-CM

## 2024-08-23 PROCEDURE — 93923 UPR/LXTR ART STDY 3+ LVLS: CPT

## 2024-08-23 PROCEDURE — 93923 UPR/LXTR ART STDY 3+ LVLS: CPT | Performed by: INTERNAL MEDICINE

## 2024-08-26 ENCOUNTER — OFFICE VISIT (OUTPATIENT)
Dept: WOUND CARE | Facility: CLINIC | Age: 79
End: 2024-08-26
Payer: MEDICARE

## 2024-08-26 PROCEDURE — 11042 DBRDMT SUBQ TIS 1ST 20SQCM/<: CPT

## 2024-08-27 ENCOUNTER — TREATMENT (OUTPATIENT)
Dept: PHYSICAL THERAPY | Facility: CLINIC | Age: 79
End: 2024-08-27
Payer: MEDICARE

## 2024-08-27 DIAGNOSIS — M25.551 RIGHT HIP PAIN: ICD-10-CM

## 2024-08-27 PROCEDURE — 97110 THERAPEUTIC EXERCISES: CPT | Mod: GP

## 2024-08-27 NOTE — PROGRESS NOTES
PHYSICAL THERAPY TREATMENT NOTE    Patient Name:  Kit Franklin   Patient MRN: [id number]  Date: 08/27/24  Time Calculation  Start Time: 1602  Stop Time: 1643  Time Calculation (min): 41 min      Problem List Items Addressed This Visit             ICD-10-CM    Right hip pain M25.551       Insurance:  Visit number: 2/10  Insurance Type: Payor: [employer] MEDICARE / Plan: [employer] GOLD CHOICE / Product Type: *No Product type* /   Authorization required: yes  Authorized visits: 10  Authorized date range: 7/30/24-9/27/24     General:  Reason for visit: S/P R THR 6/4/24   Referring Physician: MD Jazlyn Barkley MD appt:  08/13/24     Precautions:  Fall Risk: Low   PMH: pressure sore R heel; squamous cell carcinoma    Assessment:  Education: Reviewed home exercise program. Home exercise program instructed and issued. Answered questions about home exercise program. Provided manual cues for correct performance of exercises. Provided verbal feedback to improve exercise technique.  Progress towards functional goals: Improved gait quality. Reduced pain level.  Response to interventions:  Patient tolerated treatment session well. Patient demonstrating improved tolerance to interventions s/p vascular surgery along with improved strength with tolerance to increased resistance to exercises.Still significantly limited in quad/hip flexor strength as patient is unable to complete SLR without quad lag. Also demonstrates continued altered gait mechanics with decreased R LE stance time and lateral trunk sway. These issues are likely  exacerbated by continued use of wound boot on R foot. Updated HEP to include SLR, HL hip ABD, Staggered STS, standing TKE, and 3 way hip vs TB.   Justification for continued skilled care: To address remaining functional, objective and subjective deficits to allow them to return to full  "independence with ADLs. Assess effectiveness of HEP. Modify and progress home exercise program.    Plan:  Exercises targeting surrounding musculature to stabilize the joint and improve function. Training to regain normal walking patterns. Reduce risk of falling. Improve balance. Emphasis on regaining hip flexor and quad strength. Progress program with step ups, shuttle machine, and step downs.     Subjective:   Kit reports he feels like his condition is improving.  Patient reports s/p AORTOBIFEMORAL ARTERIOGRAM RIGHT LEG. Reports he has had significant relief of sx post surgery and denies any post-operative complications like infection, blood clot, or falls. Progress towards functional goal:   Improved strength and mobility Reduced pain level.   Pain (0-10): 2    HEP adherence / understanding: partial compliance with the instructed home exercises.    Objective:  Gait assessment - antalgic gait with no SPC; moderate lateral trunk sway lacking full L TKE and leg extension    Treatment Performed: (\"NP\" = Not Performed)     Therapeutic Exercise:     41 minutes  Access Code: [email or web address]  Upright bike seat height 2 - 5 minutes with subjective taken  Ayo stretch x1 20 sec holds  SLR 2x8  HL ABD  vs TB 2x10 5 sec holds (blue, black)  Staggered sit to stand 2x10  Standing TKE vs black TB  3 way hip 2x8 YTB  Standing TKE vs Black TB 2x10 3 sec holds  Fwd step ups x10 4in step 0 UE support      "

## 2024-08-29 ENCOUNTER — TREATMENT (OUTPATIENT)
Dept: PHYSICAL THERAPY | Facility: CLINIC | Age: 79
End: 2024-08-29
Payer: MEDICARE

## 2024-08-29 DIAGNOSIS — M25.551 RIGHT HIP PAIN: ICD-10-CM

## 2024-08-29 PROCEDURE — 97110 THERAPEUTIC EXERCISES: CPT | Mod: GP,CQ

## 2024-08-29 NOTE — PROGRESS NOTES
PHYSICAL THERAPY TREATMENT NOTE    Patient Name:  Kit Franklin   Patient MRN: [id number]  Date: 08/29/24  Time Calculation  Start Time: 1045  Stop Time: 1125  Time Calculation (min): 40 min      Problem List Items Addressed This Visit             ICD-10-CM    Right hip pain M25.551       Insurance:  Visit number: 4  Insurance Type: Payor: [employer] MEDICARE / Plan: [employer] GOLD CHOICE / Product Type: *No Product type* /   Authorization required: yes  Authorized visits: 10  Authorized date range: 7/30/24-9/27/24     General:  Reason for visit: S/P R THR 6/4/24   Referring Physician: MD Jazlyn Barkley MD appt:  08/13/24     Precautions:  Fall Risk: Low   PMH: pressure sore R heel; squamous cell carcinoma    Assessment:  Education: Reviewed HEP, emphasis on control with YTB, vs snapping it.   Progress towards functional goals: Improved gait quality. Reduced pain level.  Response to interventions:  Patient tolerated treatment session well. Patient demonstrating improved tolerance to interventions , able to initiate shuttle for BLE's . Continued altered gait mechanics with decreased R LE stance time and lateral trunk sway. These issues are likely  exacerbated by continued use of wound boot on R foot. .   Justification for continued skilled care: To address remaining functional, objective and subjective deficits to allow them to return to full independence with ADLs. Assess effectiveness of HEP. Modify and progress home exercise program.    Plan:  Exercises targeting surrounding musculature to stabilize the joint and improve function. Training to regain normal walking patterns. Reduce risk of falling. Improve balance. Emphasis on regaining hip flexor and quad strength. Progress program with  increased step ups, shuttle machine, and step downs.     Subjective:   Kit reports he feels like his  "condition is improving.  Pain comes and goes , today a little more than last time , though reports it is irrelevant to last session. Feels like he is getting stronger, going up /down the steps at home one at a time , as he is careful. Some challenges with the YTB   Progress towards functional goal:   Improved strength and mobility Reduced pain level.   Pain (0-10): 3   HEP adherence / understanding: partial compliance with the instructed home exercises.    Objective:  Gait assessment - antalgic gait with no SPC; moderate lateral trunk sway lacking full L TKE and leg extension    Treatment Performed: (\"NP\" = Not Performed)     Therapeutic Exercise:     40 minutes  Access Code: [email or web address]  Upright bike seat height 2 - 5 minutes with subjective taken  Ayo stretch x1 20 sec holds  STS with arms crossed from mat 10x     SLR 2x8  HL ABD  vs TB 2x10 5 sec holds  black  Staggered sit to stand 2x10 NP  Standing TKE vs black TB  3 x 10  Shuttle BLE's 75# 2 x 10  3 way hip 2x8 YTB  Standing TKE vs Black TB 2x10 3 sec holds  Fwd step ups 2x10 4in step R UE support with verbal cues for weight shift onto RLE      "

## 2024-09-03 ENCOUNTER — OFFICE VISIT (OUTPATIENT)
Dept: WOUND CARE | Facility: CLINIC | Age: 79
End: 2024-09-03
Payer: MEDICARE

## 2024-09-03 PROCEDURE — 11042 DBRDMT SUBQ TIS 1ST 20SQCM/<: CPT

## 2024-09-03 PROCEDURE — 11042 DBRDMT SUBQ TIS 1ST 20SQCM/<: CPT | Performed by: SURGERY

## 2024-09-04 ENCOUNTER — TREATMENT (OUTPATIENT)
Dept: PHYSICAL THERAPY | Facility: CLINIC | Age: 79
End: 2024-09-04
Payer: MEDICARE

## 2024-09-04 DIAGNOSIS — M25.551 RIGHT HIP PAIN: ICD-10-CM

## 2024-09-04 PROCEDURE — 97112 NEUROMUSCULAR REEDUCATION: CPT | Mod: GP

## 2024-09-04 PROCEDURE — 97110 THERAPEUTIC EXERCISES: CPT | Mod: GP

## 2024-09-04 NOTE — PROGRESS NOTES
PHYSICAL THERAPY TREATMENT NOTE    Patient Name:  Kit Franklin   Patient MRN: [id number]  Date: 09/04/24  Time Calculation  Start Time: 1032  Stop Time: 1110  Time Calculation (min): 38 min      Problem List Items Addressed This Visit             ICD-10-CM    Right hip pain M25.551       Insurance:  Visit number: 5  Insurance Type: Payor: [employer] MEDICARE / Plan: [employer] GOLD CHOICE / Product Type: *No Product type* /   Authorization required: yes  Authorized visits: 10  Authorized date range: 7/30/24-9/27/24     General:  Reason for visit: S/P R THR 6/4/24   Referring Physician: MD Jazlyn Barkley MD appt:  08/13/24     Precautions:  Fall Risk: Low   PMH: pressure sore R heel; squamous cell carcinoma    Assessment:  Education: Reviewed home exercise program. Home exercise program instructed and issued. Answered questions about home exercise program. Provided manual cues for correct performance of exercises. Provided verbal feedback to improve exercise technique.  Progress towards functional goals: Improved gait quality. Improved walking distance. Reduced pain level.  Response to interventions:  Patient tolerated treatment session well with no reported increase in pain sx throughout. Required minimal VC for maintaining TKE with SLR, but improved with repetitions post cueing with patient still significantly challenged after 6 reps. Demonstrated improving strength and balance with step and david exercises today with patient requiring 0-1 UE support for safe and good performance. No significant LOB displayed with david exercises, but patient required occasional U UE support to self-correct for mild unsteadiness. Updated HEP to include single leg step up and LAQ. Primary deficits limiting function at this time include strength and balance which patient would benefit from continued skilled PT  "services to address.    Justification for continued skilled care: To address remaining functional, objective and subjective deficits to allow them to return to full independence with ADLs. Assess effectiveness of HEP. Modify and progress home exercise program.    Plan:  Exercises targeting surrounding musculature to stabilize the joint and improve function. Training to regain normal walking patterns. Improve balance.    Subjective:   Kit reports he feels like his condition is improving. States his wound is improving, but still needs to wear wound boot.  Progress towards functional goal:  Improved gait quality. Reduced pain level.   Pain (0-10): 0    HEP adherence / understanding: compliance with the instructed home exercises.    Objective:  Gait assessment - antalgic gait with no SPC; moderate lateral trunk sway lacking full L TKE and leg extension     Treatment Performed: (\"NP\" = Not Performed)     Therapeutic Exercise:     30 minutes  Access Code: [email or web address]  Upright bike seat height 2 - 5 minutes with subjective taken  Ayo stretch x2 20 sec holds  SLR 2x8 with boot on   Seated LAQ 2x10 (YTB; RTB)  Staggered STS arms crossed from plinth x10  Fwd step ups 2x10 4in step (U UE support; 0 UE support)  Fwd single leg step up 2x10 (4in step U UE support; 6in step U UE support  Lateral single let step ups x20 (6in step U UE support)  Lateral tap downs 4in step U UE support x12  Fwd tap downs 4in step U UE support x12     Not performed today:  HL ABD  vs TB 2x10 5 sec holds  black  Staggered sit to stand 2x10 NP  Standing TKE vs black TB  3 x 10  Shuttle BLE's 75# 2 x 10  3 way hip 2x8 YTB  Standing TKE vs Black TB 2x10 3 sec holds          Neuromuscular Re-education:    8 minutes  Fwd step over low hurdles step to pattern near bar 0 UE support - SBA  Lateral step over low hurdles step to pattern near bar U UE support - SBA      Short-term goals: (2-4 weeks)  1.) Patient will report decrease in worst reported pain by 2 " points. (5/10) (9/4/24 - met)  2.) Patient will demonstrate improved gait mechanics with increased L LE stance time and LRAD (9/4/24 - met)     Goals:  1.) Independent with HEP to expedite progress and promote goal achievement.  2.) Reduce worst reported pain within last 48 hours to < 2/10 in order to  allow patient to perform daily tasks without limitation/with decreased discomfort.  3.) Improve L hip flexion AROM to >/= 100 degrees   4.) Improve L LE strength to >/= 4+/5 in order to allow patient to return to PLOF and tolerate standing and walking for prolonged durations.  5.) Report change in LEFS by greater than or equal to 9 points to meet the MCID (IE 27/80)  6.) Demonstrate improved strength evidenced by improved performance of 30 second sit to stand by +2.5 transfers with reduced UE support. (IE: 8 with B UE support)  7.) Demonstrated reduced fall risk and improved balance evidenced by improved performance of TUG of < 14 seconds with LRAD

## 2024-09-09 ENCOUNTER — OFFICE VISIT (OUTPATIENT)
Dept: WOUND CARE | Facility: CLINIC | Age: 79
End: 2024-09-09
Payer: MEDICARE

## 2024-09-09 ENCOUNTER — APPOINTMENT (OUTPATIENT)
Dept: ORTHOPEDIC SURGERY | Facility: CLINIC | Age: 79
End: 2024-09-09
Payer: MEDICARE

## 2024-09-09 ENCOUNTER — TREATMENT (OUTPATIENT)
Dept: PHYSICAL THERAPY | Facility: CLINIC | Age: 79
End: 2024-09-09
Payer: MEDICARE

## 2024-09-09 ENCOUNTER — HOSPITAL ENCOUNTER (OUTPATIENT)
Dept: RADIOLOGY | Facility: CLINIC | Age: 79
Discharge: HOME | End: 2024-09-09
Payer: MEDICARE

## 2024-09-09 DIAGNOSIS — M25.551 RIGHT HIP PAIN: ICD-10-CM

## 2024-09-09 DIAGNOSIS — M16.11 ARTHRITIS OF RIGHT HIP: ICD-10-CM

## 2024-09-09 DIAGNOSIS — M16.11 ARTHRITIS OF RIGHT HIP: Primary | ICD-10-CM

## 2024-09-09 PROCEDURE — 1036F TOBACCO NON-USER: CPT | Performed by: ORTHOPAEDIC SURGERY

## 2024-09-09 PROCEDURE — 99213 OFFICE O/P EST LOW 20 MIN: CPT | Performed by: ORTHOPAEDIC SURGERY

## 2024-09-09 PROCEDURE — 1159F MED LIST DOCD IN RCRD: CPT | Performed by: ORTHOPAEDIC SURGERY

## 2024-09-09 PROCEDURE — 1111F DSCHRG MED/CURRENT MED MERGE: CPT | Performed by: ORTHOPAEDIC SURGERY

## 2024-09-09 PROCEDURE — 97110 THERAPEUTIC EXERCISES: CPT | Mod: GP

## 2024-09-09 PROCEDURE — 73502 X-RAY EXAM HIP UNI 2-3 VIEWS: CPT | Mod: RIGHT SIDE | Performed by: RADIOLOGY

## 2024-09-09 PROCEDURE — 99213 OFFICE O/P EST LOW 20 MIN: CPT | Mod: 25 | Performed by: ORTHOPAEDIC SURGERY

## 2024-09-09 PROCEDURE — 11042 DBRDMT SUBQ TIS 1ST 20SQCM/<: CPT

## 2024-09-09 PROCEDURE — 73502 X-RAY EXAM HIP UNI 2-3 VIEWS: CPT | Mod: RT

## 2024-09-09 PROCEDURE — 11042 DBRDMT SUBQ TIS 1ST 20SQCM/<: CPT | Performed by: NURSE PRACTITIONER

## 2024-09-09 PROCEDURE — 1160F RVW MEDS BY RX/DR IN RCRD: CPT | Performed by: ORTHOPAEDIC SURGERY

## 2024-09-09 RX ORDER — AMOXICILLIN 500 MG/1
2000 TABLET, FILM COATED ORAL ONCE
Qty: 4 TABLET | Refills: 3 | Status: SHIPPED | OUTPATIENT
Start: 2024-09-09 | End: 2024-09-09

## 2024-09-09 NOTE — PROGRESS NOTES
PHYSICAL THERAPY TREATMENT NOTE    Patient Name:  Kit Franklin   Patient MRN: [id number]  Date: 09/09/24  Time Calculation  Start Time: 1433  Stop Time: 1513  Time Calculation (min): 40 min      Problem List Items Addressed This Visit             ICD-10-CM    Right hip pain M25.551       Insurance:  Visit number: 6  Insurance Type: Payor: HU[employer]NA MEDICARE / Plan: HUMANA GOLD CHOICE / Product Type: *No Product type* /   Authorization required: yes  Authorized visits: 10  Authorized date range: 7/30/24-9/27/24     General:  Reason for visit: S/P R THR 6/4/24   Referring Physician: MD Jazlyn Barkley MD appt:  08/13/24     Precautions:  Fall Risk: Low   PMH: pressure sore R heel; squamous cell carcinoma    Assessment:  Education: Reviewed home exercise program. Answered questions about home exercise program. Provided verbal feedback to improve exercise technique.  Progress towards functional goals:   Improved gait quality. Improved ability to negotiate stairs. Improved walking distance.  Response to interventions:  Patient demonstrating mild improvement in gait mechanics on flat ground with observable increase in tita and stride length, but still with lateral trunk sway and mild antalgic pattern. Continues to demonstrate mild quad lag after 6 reps of SLR, but showing improvement since last performance. Continued reliance on UE support for step ups likely 2/2 balance deficits along with strength.  Tolerated treatment session well without any increase in pain. Improved independence with home exercises. Patient was appropriately fatigued with no complaints. Improved tolerance to strengthening progressions.  Justification for continued skilled care: To address remaining functional, objective and subjective deficits to allow them to return to full independence with ADLs. Assess effectiveness of  "HEP. Modify and progress home exercise program. Progressive strengthening to stabilize the hip to improve function.    Plan:  Assess tolerance to HEP. Progress program with emphasis on dynamic balance training and continued focus on quad and hip strengthening for improved navigation of stairs.    Subjective:   Kit reports he feels like his condition is improving. Patient states he strained his hip attempting  Progress towards functional goal:  Improved gait quality. Improved ability to negotiate stairs. Improved walking distance.   Pain (0-10): 1    HEP adherence / understanding: compliance with the instructed home exercises.    Objective:  Gait assessment - antalgic gait with no SPC; moderate lateral trunk sway lacking full L TKE and leg extension     Treatment Performed: (\"NP\" = Not Performed)     Therapeutic Exercise:     40 minutes  Access Code: [email or web address]  Upright bike seat height 2 L3 resistance 5 minutes with subjective taken  Seated flexion/ADD stretch reaching in between ankles x10 3 sec holds  SLR 2x10 - mild quad lag observed   Seated LAQ 2x10 (RTB)  R single leg step ups 3x10 (6 in step; 8 in step 3rd set) U UE support   Lateral tap downs 6in step U UE support 2x10 - VC for TKE  Resisted side steps 4 laps at bar (2nd set 0 UE support)       Not performed today:      Short-term goals: (2-4 weeks)  1.) Patient will report decrease in worst reported pain by 2 points. (5/10) (9/4/24 - met)  2.) Patient will demonstrate improved gait mechanics with increased L LE stance time and LRAD (9/4/24 - met)     Goals:  1.) Independent with HEP to expedite progress and promote goal achievement.  2.) Reduce worst reported pain within last 48 hours to < 2/10 in order to  allow patient to perform daily tasks without limitation/with decreased discomfort.  3.) Improve L hip flexion AROM to >/= 100 degrees   4.) Improve L LE strength to >/= 4+/5 in order to allow patient to return to PLOF and tolerate standing and walking for " prolonged durations.  5.) Report change in LEFS by greater than or equal to 9 points to meet the MCID (IE 27/80)  6.) Demonstrate improved strength evidenced by improved performance of 30 second sit to stand by +2.5 transfers with reduced UE support. (IE: 8 with B UE support)  7.) Demonstrated reduced fall risk and improved balance evidenced by improved performance of TUG of < 14 seconds with LRAD

## 2024-09-09 NOTE — PROGRESS NOTES
Subjective    Patient ID: Kit Franklin is a 78 y.o. male.    Chief Complaint: Follow-up of the Right Hip (FUV RT CUCA DOS 6/4/24) and OTHER     Last Surgery: Right Total Hip Replacement - Right  Last Surgery Date: 6/4/2024    HPI  Patient comes in for follow-up of his right total hip replacement.  He is a little over 3 months out from surgery.  Since his last visit he underwent placement of a stent in his right femoral artery.  Since then he has noticed improvement in leg function as he feels stronger.  He also has had healing of his right heel ulcer.    Objective   Ortho Exam  Patient is in no acute distress.  He is walking with no evidence of an antalgic gait.  He has no complaints of pain with right hip flexion or rotation.  There was no tenderness to palpation.  There is no erythema near the incision.    Image Results:  X-rays of the patient's right hip were personally reviewed today.  They show adequate alignment of the prosthesis.  There is no evidence of fracture, dislocation or loosening.    Assessment/Plan   Encounter Diagnoses:  Arthritis of right hip    Orders Placed This Encounter    XR hip right with pelvis when performed 2 or 3 views    amoxicillin (Amoxil) 500 mg tablet     Patient is doing satisfactory following his right total hip replacement.  He will continue with his home exercise program.  He will follow-up in 1 year with x-rays of his right hip.

## 2024-09-11 NOTE — PROGRESS NOTES
PHYSICAL THERAPY TREATMENT NOTE    Patient Name:  Kit Franklin   Patient MRN: [id number]  Date: 09/12/24  Time Calculation  Start Time: 0944  Stop Time: 1027  Time Calculation (min): 43 min      Problem List Items Addressed This Visit             ICD-10-CM    Right hip pain M25.551       Insurance:  Visit number: 7  Insurance Type: Payor: HU[employer] MEDICARE / Plan: HU[employer]NA GOLD CHOICE / Product Type: *No Product type* /   Authorization required: yes  Authorized visits: 10  Authorized date range: 7/30/24-9/27/24     General:  Reason for visit: S/P R THR 6/4/24   Referring Physician: MD Jazlyn Barkley MD appt:  08/13/24     Precautions:  Fall Risk: Low   PMH: pressure sore R heel; squamous cell carcinoma    Assessment:  Education: Reviewed home exercise program. Home exercise program instructed and issued. Answered questions about home exercise program. Provided manual cues for correct performance of exercises. Provided verbal feedback to improve exercise technique.  Progress towards functional goals:  States he was able to swing his golf club yesterday with no issues. Improved gait quality. Reduced pain level.  Response to interventions:  Patient tolerated treatment session well. Tolerated strengthening progressions well with no increase in pain sx. Significantly challenged with balance interventions today with patient requiring U UE support for dynamic and single leg balance exercises. Patient particularly challenged with advancing L LE over david as patient reports he feels he does not have confidence or fine motor control in R LE for controlled movements or balance likely 2/2 decreased sensation in foot. Able to decrease UE support with stepping over ankle weight and encouraged patient to incorporate this into HEP. Challenged with mini lunges to foam pads, but able to perform correctly with  "minimal cueing after initial demo.   Justification for continued skilled care: To address remaining functional, objective and subjective deficits to allow them to return to full independence with ADLs. Assess effectiveness of HEP. Modify and progress home exercise program.    Plan:  Assess tolerance to updated HEP. Progress program with emphasis on dynamic balance training and closed chain strengthening with emphasis on quad strengthening.    Subjective:   Kit reports he feels like his condition is improving. Presents to PT wearing tennis shoes. I asked the patient if he had been cleared to wean from wound boot, but states he was not. Progress towards functional goal:   States he was able to swing his golf club yesterday with no issues. Patient had not received verbal clearance from PT or from MD based on my knowledge at this time. Improved gait quality. Reduced pain level.   Pain (0-10): 0    HEP adherence / understanding: not compliant with HEP    Objective:  Gait assessment - antalgic with wide THERESA and lateral trunk sway, lacking TKE in R LE    Treatment Performed: (\"NP\" = Not Performed)     Therapeutic Exercise:     28 minutes  Access Code: [email or web address]  Upright bike seat height 2 L4 resistance 5 minutes  DL shuttle 2x10 (75#; 87#)  SL shuttle x10 (50#)  Resisted side steps 4 laps at bar (2nd set 0 UE support) RTB around ankles  Lunges with U UE support to 2 foam pads stacked 2x8  Heel raises 2x15  Not performed today:  Leg extension  Seated flexion/ADD stretch reaching in between ankles x10 3 sec holds  SLR 2x10 - mild quad lag observed   Seated LAQ 2x10 (RTB)  R single leg step ups 3x10 (6 in step; 8 in step 3rd set) U UE support   Lateral tap downs 6in step U UE support 2x10 - VC for TKE      Neuromuscular Re-education:    15 minutes  Single leg stance  0 UE support - unable  Semi-tandem 30 sec 0 UE support -VC for TKE  Semi-tandem with multi directional reaching for cones 2 minutes  Tandem stance 30 sec 0 UE " support  Fwd ambulation over low hurdles 3x  Side stepping over hurdles 1x  Fwd/retro stepping over low david 2 minutes  Lateral/medial stepping over low david 2 minutes

## 2024-09-12 ENCOUNTER — TREATMENT (OUTPATIENT)
Dept: PHYSICAL THERAPY | Facility: CLINIC | Age: 79
End: 2024-09-12
Payer: MEDICARE

## 2024-09-12 DIAGNOSIS — M25.551 RIGHT HIP PAIN: ICD-10-CM

## 2024-09-12 PROCEDURE — 97110 THERAPEUTIC EXERCISES: CPT | Mod: GP

## 2024-09-12 PROCEDURE — 97112 NEUROMUSCULAR REEDUCATION: CPT | Mod: GP

## 2024-09-16 ENCOUNTER — OFFICE VISIT (OUTPATIENT)
Dept: WOUND CARE | Facility: CLINIC | Age: 79
End: 2024-09-16
Payer: MEDICARE

## 2024-09-16 PROCEDURE — 11042 DBRDMT SUBQ TIS 1ST 20SQCM/<: CPT

## 2024-09-18 ENCOUNTER — HOSPITAL ENCOUNTER (OUTPATIENT)
Dept: RADIOLOGY | Facility: CLINIC | Age: 79
Discharge: HOME | End: 2024-09-18
Payer: MEDICARE

## 2024-09-18 DIAGNOSIS — L89.613 PRESSURE ULCER OF RIGHT HEEL, STAGE 3 (MULTI): ICD-10-CM

## 2024-09-18 PROCEDURE — 73650 X-RAY EXAM OF HEEL: CPT | Mod: RIGHT SIDE | Performed by: RADIOLOGY

## 2024-09-18 PROCEDURE — 73650 X-RAY EXAM OF HEEL: CPT | Mod: RT

## 2024-09-19 ENCOUNTER — TREATMENT (OUTPATIENT)
Dept: PHYSICAL THERAPY | Facility: CLINIC | Age: 79
End: 2024-09-19
Payer: MEDICARE

## 2024-09-19 ENCOUNTER — APPOINTMENT (OUTPATIENT)
Dept: VASCULAR SURGERY | Facility: CLINIC | Age: 79
End: 2024-09-19
Payer: MEDICARE

## 2024-09-19 VITALS
DIASTOLIC BLOOD PRESSURE: 72 MMHG | HEIGHT: 67 IN | HEART RATE: 81 BPM | WEIGHT: 207 LBS | SYSTOLIC BLOOD PRESSURE: 130 MMHG | BODY MASS INDEX: 32.49 KG/M2 | OXYGEN SATURATION: 99 %

## 2024-09-19 DIAGNOSIS — M25.551 RIGHT HIP PAIN: ICD-10-CM

## 2024-09-19 DIAGNOSIS — I73.9 PAD (PERIPHERAL ARTERY DISEASE) (CMS-HCC): Primary | ICD-10-CM

## 2024-09-19 PROCEDURE — 97112 NEUROMUSCULAR REEDUCATION: CPT | Mod: GP

## 2024-09-19 PROCEDURE — 99024 POSTOP FOLLOW-UP VISIT: CPT | Performed by: SURGERY

## 2024-09-19 PROCEDURE — 3078F DIAST BP <80 MM HG: CPT | Performed by: SURGERY

## 2024-09-19 PROCEDURE — 1036F TOBACCO NON-USER: CPT | Performed by: SURGERY

## 2024-09-19 PROCEDURE — 3075F SYST BP GE 130 - 139MM HG: CPT | Performed by: SURGERY

## 2024-09-19 PROCEDURE — 1159F MED LIST DOCD IN RCRD: CPT | Performed by: SURGERY

## 2024-09-19 PROCEDURE — 97110 THERAPEUTIC EXERCISES: CPT | Mod: GP

## 2024-09-19 PROCEDURE — 1160F RVW MEDS BY RX/DR IN RCRD: CPT | Performed by: SURGERY

## 2024-09-19 NOTE — PROGRESS NOTES
"Kit Franklin is a 78 y.o. male     Subjective   This patient presents today status post right iliofemoral endarterectomy.  His right femoral incision is without issue.  He states that his right leg is still significantly better and he is able to walk farther.  He is being treated in the wound care center for right heel ulcer which does seem to be improving.  He denies any fever chills nausea vomiting or headache         Objective     Vitals:    09/19/24 0700   BP: 130/72   Pulse: 81   SpO2: 99%      Physical Exam  This patient is alert and oriented x 3.  He is in no acute distress.  Head is normocephalic.  His right femoral incision is healing well.  He has a palpable popliteal pulse and he has a normal dorsalis pedis pulse.  He does have reperfusion edema of his right lower leg.  His right heel ulcer is significantly smaller and the base of the ulcer is very healthy.  Blood pressure 130/72, pulse 81, height 1.702 m (5' 7\"), weight 93.9 kg (207 lb), SpO2 99%.            Patient Active Problem List    Diagnosis Date Noted    PAD (peripheral artery disease) (CMS-HCC) 08/08/2024    Sepsis (Multi) 06/08/2024    Weakness generalized 06/07/2024    Primary osteoarthritis of right hip 06/04/2024    Osteoarthritis 02/01/2023    Pancreatic cyst ([facility]) 02/01/2023    Polyarthropathy 02/01/2023    Psoriasis 02/01/2023    RBBB 02/01/2023    Right shoulder pain 02/01/2023    Rotator cuff injury 02/01/2023    Scalp lesion 02/01/2023    Sigmoid volvulus (Multi) 02/01/2023    Systolic murmur 02/01/2023    Weakness of shoulder 02/01/2023    Arthralgia of hip, right 01/31/2023    Arthritis of right hip 01/31/2023    Benign essential HTN 01/31/2023    Bilateral hearing loss 01/31/2023    Bilateral impacted cerumen 01/31/2023    Chronic back pain 01/31/2023    Complaints of leg weakness 01/31/2023    Concentration deficit 01/31/2023    COVID-19 01/31/2023    Diarrhea 01/31/2023    Dysuria 01/31/2023    Facet degeneration of lumbar " region 01/31/2023    Right hip pain 01/31/2023    History of claustrophobia 01/31/2023    Hyperlipemia 01/31/2023    Irritable bowel syndrome with diarrhea 01/31/2023    Lipoma 01/31/2023    Lumbar radiculopathy 01/31/2023    Numbness 01/31/2023    Obesity 01/31/2023          Current Outpatient Medications:     acetaminophen (Tylenol) 325 mg tablet, Take 2 tablets (650 mg) by mouth every 6 hours., Disp: , Rfl:     amoxicillin-pot clavulanate (Augmentin) 875-125 mg tablet, Take 1 tablet (875 mg) by mouth 2 times a day. Take for 10 days, Disp: , Rfl:     aspirin 81 mg EC tablet, Take 1 tablet (81 mg) by mouth once daily., Disp: 30 tablet, Rfl: 3    cyclobenzaprine (Flexeril) 5 mg tablet, Take by mouth., Disp: , Rfl:     furosemide (Lasix) 20 mg tablet, Take 1-2 tablets (20-40 mg) by mouth once daily., Disp: 60 tablet, Rfl: 3    metoprolol succinate XL (Toprol-XL) 50 mg 24 hr tablet, Take 1 tablet (50 mg) by mouth once daily., Disp: 90 tablet, Rfl: 3    multivitamin with minerals tablet, Take 1 tablet by mouth once daily., Disp: , Rfl:     potassium chloride CR (Klor-Con) 10 mEq ER tablet, Take 2 tablets (20 mEq) by mouth once daily. Do not crush or chew., Disp: 60 tablet, Rfl: 3    simethicone (Mylicon) 80 mg chewable tablet, Chew 0.5 tablets (40 mg) 4 times a day as needed for flatulence., Disp: 30 tablet, Rfl: 0    simvastatin (Zocor) 20 mg tablet, Take 1 tablet (20 mg) by mouth once daily., Disp: 90 tablet, Rfl: 2    tamsulosin (Flomax) 0.4 mg 24 hr capsule, Take 1 capsule (0.4 mg) by mouth once daily., Disp: 90 capsule, Rfl: 2     Lab Results   Component Value Date    WBC 7.2 08/16/2024    HGB 12.1 (L) 08/16/2024    HCT 38.6 (L) 08/16/2024     08/16/2024    CHOL 155 11/21/2023    TRIG 126 11/21/2023    HDL 55.5 11/21/2023    ALT 13 07/11/2024    AST 14 07/11/2024     08/16/2024    K 3.8 08/16/2024     08/16/2024    CREATININE 0.80 08/16/2024    BUN 12 08/16/2024    CO2 26 08/16/2024    TSH 2.24  06/07/2024    PSA 0.17 08/21/2020    INR 1.0 05/21/2024    HGBA1C 5.6 11/21/2023       No results found.              Assessment/Plan   Active Problems:  There are no active Hospital Problems.      This patient presents today status post right iliofemoral endarterectomy.  He states that his right leg is still better.  The surgery was about a month ago.  His right heel ulcer is definitely improving.  He has easily palpable right popliteal and right dorsalis pedis pulse.  He does have reperfusion edema.  I have extensively gone over the need to elevate his leg above his heart during the day multiple times.  He will follow-up with me in 6 to 8 weeks.  He is doing weekly visits in the wound care center.      Gilles Brown, DO

## 2024-09-19 NOTE — PROGRESS NOTES
PHYSICAL THERAPY TREATMENT NOTE    Patient Name:  Kit Franklin   Patient MRN: [id number]  Date: 09/19/24  Time Calculation  Start Time: 1345  Stop Time: 1426  Time Calculation (min): 41 min      Problem List Items Addressed This Visit             ICD-10-CM    Right hip pain M25.551       Insurance:  Visit number: 8  Insurance Type: Payor: HU[employer]NA MEDICARE / Plan: HU[employer]NA GOLD CHOICE / Product Type: *No Product type* /   Authorization required: yes  Authorized visits: 10  Authorized date range: 7/30/24-9/27/24     General:  Reason for visit: S/P R THR 6/4/24   Referring Physician: MD Jazlyn Barkley MD appt:  08/13/24     Precautions:  Fall Risk: Low   PMH: pressure sore R heel; squamous cell carcinoma    Assessment:  Education: Reviewed home exercise program. Answered questions about home exercise program. Provided verbal feedback to improve exercise technique.  Progress towards functional goals: Patient reports there has not been a significant change in functional abilities.  Response to interventions:  Patient tolerated treatment session well. Patient continues to demonstrated compensatory gait mechanics likely 2/2 offloading shoe and strength deficits. Significantly challenged with dynamic balance exercises including hurdles as patient feels he does not have sufficient proprioception and fine motor control in R foot for good balance control on R LE. Required constant SBA with 0 UE support. No LOB displayed. Patient required frequent encouragement for task completion as patient demonstrates fear of prolonged R single leg balance without 0 UE support. Displayed improvement in balance however as patient was able to complete step overs with 0 UE support, but not to tap blaze pods.  Tolerated treatment session well without any increase in pain. Improved independence with home exercises. Patient  "was appropriately fatigued with no complaints. Patient required increased cuing and demonstrations for exercises which increases time required for interventions.  Justification for continued skilled care: To address remaining functional, objective and subjective deficits to allow the patient to return to full independence with ADLs. Assess effectiveness of HEP. Modify and progress home exercise program.    Plan:  Exercises targeting surrounding musculature to hip and improve function. Training to regain normal walking patterns. Improve balance.    Subjective:   Kit reports he feels like his condition is neither improving nor worsening.  Progress towards functional goal:   States he feels he still has difficulty with balance 2/2 offloading shoe and neuropathy.  Patient reports there has not been a significant change in functional abilities.   Pain (0-10): 2    HEP adherence / understanding: partial compliance with the instructed home exercises.    Objective:      Treatment Performed: (\"NP\" = Not Performed)     Therapeutic Exercise:     26 minutes  Access Code: [email or web address]  DL shuttle 2x10 (50#; 100#)  SL shuttle 2x10 (50#)  Cybex Leg extension 2x10 (10#; 20#)  Resisted side steps 4 laps at bar  RTB around ankles 0 UE support  Lunges with U UE support to 2 foam pads stacked (RLE in front) 2x6  Squat taps to 20 in box 2x10    Not performed today:  Heel raises 2x15  Upright bike seat height 2 L4 resistance 5 minutes  Seated flexion/ADD stretch reaching in between ankles x10 3 sec holds  SLR 2x10 - mild quad lag observed   Seated LAQ 2x10 (RTB)  R single leg step ups 3x10 (6 in step; 8 in step 3rd set) U UE support   Lateral tap downs 6in step U UE support 2x10 - VC for TKE      Neuromuscular Re-education:    15 minutes  Fwd/retro stepping over low david 2 minutes 0 UE support SBA  Lateral/medial stepping over low david 2 minutes 0 UE support SBA  Foot taps to blazepods over low hurdles x6 30 sec rest (1st attempt 0 UE " support; 2nd attempt with U UE support)  Not performed today:  Single leg stance  0 UE support - unable  Semi-tandem 30 sec 0 UE support -VC for TKE  Semi-tandem with multi directional reaching for cones 2 minutes  Tandem stance 30 sec 0 UE support  Fwd ambulation over low hurdles 3x  Side stepping over hurdles 1x

## 2024-09-23 ENCOUNTER — OFFICE VISIT (OUTPATIENT)
Dept: WOUND CARE | Facility: CLINIC | Age: 79
End: 2024-09-23
Payer: MEDICARE

## 2024-09-23 PROCEDURE — 11042 DBRDMT SUBQ TIS 1ST 20SQCM/<: CPT

## 2024-09-26 ENCOUNTER — TREATMENT (OUTPATIENT)
Dept: PHYSICAL THERAPY | Facility: CLINIC | Age: 79
End: 2024-09-26
Payer: MEDICARE

## 2024-09-26 DIAGNOSIS — M25.551 RIGHT HIP PAIN: ICD-10-CM

## 2024-09-26 PROCEDURE — 97110 THERAPEUTIC EXERCISES: CPT | Mod: GP

## 2024-09-26 NOTE — PROGRESS NOTES
"                                                                                                                         PHYSICAL THERAPY TREATMENT NOTE    Patient Name:  Kit Franklin   Patient MRN: [id number]  Date: 09/26/24  Time Calculation  Start Time: 1346  Stop Time: 1424  Time Calculation (min): 38 min      Problem List Items Addressed This Visit             ICD-10-CM    Right hip pain M25.551       Insurance:  Visit number: 9   Insurance Type: Payor: HU[employer]NA MEDICARE / Plan: HU[employer]NA GOLD CHOICE / Product Type: *No Product type* /   Authorization required: yes  Authorized visits: 10  Authorized date range: 7/30/24-9/27/24     General:  Reason for visit: S/P R THR 6/4/24   Referring Physician: MD Jazlyn Barkley MD appt:  08/13/24    Precautions:  Fall Risk: Low   PMH: pressure sore R heel; squamous cell carcinoma    Assessment:  Education: Reviewed home exercise program. Answered questions about home exercise program. Provided verbal feedback to improve exercise technique.  Progress towards functional goals:  Still feels limited in balance 2/2 neuropathy, but feels more steady with practice.   Response to interventions:  Patient continues to demonstrate improved strength and balance with reduced compensatory strategies for flat ground navigation along with ability to navigate up and down full flight of stairs with reciprocal navigation. Upon assessment, patient  demonstrated mild bend in R knee in standing, and when extended, patient L LE was raised from the ground. Upon leg length assessment, patient's R LE appeared to be slightly longer than L LE based on observation of alignment of EDGAR malleoli. Provided patient with 1/4\" L heel wedge which improved posture and patient reported improved walking mechanics. Recommended patient try for a week to determine if this is the right fit for him. Required minimal cueing from therapist for proper completion of exercises today. Good retention displayed with good " "performance with repetitions.   Justification for continued skilled care: Assess effectiveness of HEP. Modify and progress home exercise program.    Plan:  Next appointment will be a reassessment.    Subjective:   Kit reports he feels like his condition is improving. Presents to therapy today wearing regular tennis shoes. Progress towards functional goal:   Still feels limited in balance 2/2 neuropathy, but feels more steady with practice.  Improved gait quality. Improved balance.   Pain (0-10): 1    HEP adherence / understanding: compliance with the instructed home exercises.    Objective:  Stair assessment - reciprocal navigation up and down full flight of stairs with light railing support with U UE     Leg length check - R LE slightly longer than L LE observed with check at EDGAR malleoli    Treatment Performed: (\"NP\" = Not Performed)     Therapeutic Exercise:     38 minutes  Access Code: [email or web address]  Nustep seat 9 L5 5 minutes  Squat taps to box 2x10 (20 in box; 16 in box)  1/4\" heel lift fitting in L shoe - 5 minutes  Resisted side step GTB around ankles 2 laps at counter  Lateral/medial step over folded AW on ground x20  Fwd/retro steps over folded AW on ground x30  Heel raises 2x15     Not performed today:  DL shuttle 2x10 (50#; 100#)  SL shuttle 2x10 (50#)  Cybex Leg extension 2x10 (10#; 20#)  Lunges with U UE support to 2 foam pads stacked (RLE in front) 2x6    Upright bike seat height 2 L4 resistance 5 minutes  Seated flexion/ADD stretch reaching in between ankles x10 3 sec holds  SLR 2x10 - mild quad lag observed   Seated LAQ 2x10 (RTB)  R single leg step ups 3x10 (6 in step; 8 in step 3rd set) U UE support   Lateral tap downs 6in step U UE support 2x10 - VC for TKE      "

## 2024-09-30 ENCOUNTER — OFFICE VISIT (OUTPATIENT)
Dept: WOUND CARE | Facility: CLINIC | Age: 79
End: 2024-09-30
Payer: MEDICARE

## 2024-09-30 PROCEDURE — 11042 DBRDMT SUBQ TIS 1ST 20SQCM/<: CPT

## 2024-10-01 ENCOUNTER — DOCUMENTATION (OUTPATIENT)
Dept: PHYSICAL THERAPY | Facility: CLINIC | Age: 79
End: 2024-10-01
Payer: MEDICARE

## 2024-10-03 ENCOUNTER — APPOINTMENT (OUTPATIENT)
Dept: RADIOLOGY | Facility: HOSPITAL | Age: 79
End: 2024-10-03
Payer: MEDICARE

## 2024-10-03 ENCOUNTER — APPOINTMENT (OUTPATIENT)
Dept: PHYSICAL THERAPY | Facility: CLINIC | Age: 79
End: 2024-10-03
Payer: MEDICARE

## 2024-10-04 ENCOUNTER — HOSPITAL ENCOUNTER (OUTPATIENT)
Dept: RADIOLOGY | Facility: CLINIC | Age: 79
Discharge: HOME | End: 2024-10-04
Payer: MEDICARE

## 2024-10-04 ENCOUNTER — APPOINTMENT (OUTPATIENT)
Dept: RADIOLOGY | Facility: CLINIC | Age: 79
End: 2024-10-04
Payer: MEDICARE

## 2024-10-04 DIAGNOSIS — L89.613 PRESSURE ULCER OF RIGHT HEEL, STAGE 3 (MULTI): ICD-10-CM

## 2024-10-04 DIAGNOSIS — I73.9 PERIPHERAL VASCULAR DISEASE, UNSPECIFIED (CMS-HCC): ICD-10-CM

## 2024-10-04 DIAGNOSIS — Z96.641 PRESENCE OF RIGHT ARTIFICIAL HIP JOINT: ICD-10-CM

## 2024-10-04 PROCEDURE — 2550000001 HC RX 255 CONTRASTS: Performed by: FAMILY MEDICINE

## 2024-10-04 PROCEDURE — A9575 INJ GADOTERATE MEGLUMI 0.1ML: HCPCS | Performed by: FAMILY MEDICINE

## 2024-10-04 PROCEDURE — 73720 MRI LWR EXTREMITY W/O&W/DYE: CPT | Mod: RT

## 2024-10-04 RX ORDER — GADOTERATE MEGLUMINE 376.9 MG/ML
19 INJECTION INTRAVENOUS
Status: COMPLETED | OUTPATIENT
Start: 2024-10-04 | End: 2024-10-04

## 2024-10-08 ENCOUNTER — OFFICE VISIT (OUTPATIENT)
Dept: WOUND CARE | Facility: CLINIC | Age: 79
End: 2024-10-08
Payer: MEDICARE

## 2024-10-08 ENCOUNTER — LAB REQUISITION (OUTPATIENT)
Dept: LAB | Facility: HOSPITAL | Age: 79
End: 2024-10-08
Payer: MEDICARE

## 2024-10-08 ENCOUNTER — LAB (OUTPATIENT)
Dept: LAB | Facility: LAB | Age: 79
End: 2024-10-08
Payer: MEDICARE

## 2024-10-08 ENCOUNTER — TELEPHONE (OUTPATIENT)
Dept: PRIMARY CARE | Facility: CLINIC | Age: 79
End: 2024-10-08

## 2024-10-08 DIAGNOSIS — I73.9 PERIPHERAL VASCULAR DISEASE, UNSPECIFIED (CMS-HCC): ICD-10-CM

## 2024-10-08 DIAGNOSIS — L89.613 PRESSURE ULCER OF RIGHT HEEL, STAGE 3 (MULTI): Primary | ICD-10-CM

## 2024-10-08 DIAGNOSIS — Z96.641 PRESENCE OF RIGHT ARTIFICIAL HIP JOINT: ICD-10-CM

## 2024-10-08 DIAGNOSIS — G60.9 HEREDITARY AND IDIOPATHIC NEUROPATHY, UNSPECIFIED: ICD-10-CM

## 2024-10-08 DIAGNOSIS — L89.613 PRESSURE ULCER OF RIGHT HEEL, STAGE 3 (MULTI): ICD-10-CM

## 2024-10-08 LAB
CRP SERPL-MCNC: 0.56 MG/DL
ERYTHROCYTE [SEDIMENTATION RATE] IN BLOOD BY WESTERGREN METHOD: 11 MM/H (ref 0–20)

## 2024-10-08 PROCEDURE — 36415 COLL VENOUS BLD VENIPUNCTURE: CPT

## 2024-10-08 PROCEDURE — 86140 C-REACTIVE PROTEIN: CPT

## 2024-10-08 PROCEDURE — 11042 DBRDMT SUBQ TIS 1ST 20SQCM/<: CPT

## 2024-10-08 PROCEDURE — 11042 DBRDMT SUBQ TIS 1ST 20SQCM/<: CPT | Performed by: SURGERY

## 2024-10-08 PROCEDURE — 85652 RBC SED RATE AUTOMATED: CPT

## 2024-10-08 PROCEDURE — 87070 CULTURE OTHR SPECIMN AEROBIC: CPT | Mod: OUT,PARLAB | Performed by: SURGERY

## 2024-10-08 NOTE — TELEPHONE ENCOUNTER
----- Message from Elias Morley sent at 10/8/2024  8:09 AM EDT -----  Please let patient know that there was a deep infection in his heel.  This may be down at the bone.  Dr. Brown was notified and was going to have the wound center set up urgent follow-up.  Please let me know if there has not been any follow-up on this

## 2024-10-11 LAB
BACTERIA SPEC CULT: NORMAL
GRAM STN SPEC: NORMAL
GRAM STN SPEC: NORMAL

## 2024-10-15 ENCOUNTER — OFFICE VISIT (OUTPATIENT)
Dept: WOUND CARE | Facility: CLINIC | Age: 79
End: 2024-10-15
Payer: MEDICARE

## 2024-10-15 PROCEDURE — 99213 OFFICE O/P EST LOW 20 MIN: CPT

## 2024-10-22 ENCOUNTER — OFFICE VISIT (OUTPATIENT)
Dept: WOUND CARE | Facility: CLINIC | Age: 79
End: 2024-10-22
Payer: MEDICARE

## 2024-10-22 PROCEDURE — 11042 DBRDMT SUBQ TIS 1ST 20SQCM/<: CPT | Mod: 59

## 2024-10-24 RX ORDER — CLOPIDOGREL BISULFATE 75 MG/1
75 TABLET ORAL DAILY
COMMUNITY

## 2024-10-24 RX ORDER — SODIUM CHLORIDE, SODIUM LACTATE, POTASSIUM CHLORIDE, CALCIUM CHLORIDE 600; 310; 30; 20 MG/100ML; MG/100ML; MG/100ML; MG/100ML
50 INJECTION, SOLUTION INTRAVENOUS CONTINUOUS
Status: CANCELLED | OUTPATIENT
Start: 2024-10-25 | End: 2024-10-25

## 2024-10-25 ENCOUNTER — HOSPITAL ENCOUNTER (OUTPATIENT)
Facility: HOSPITAL | Age: 79
Setting detail: OUTPATIENT SURGERY
Discharge: HOME | End: 2024-10-25
Attending: PODIATRIST | Admitting: PODIATRIST
Payer: MEDICARE

## 2024-10-25 ENCOUNTER — ANESTHESIA EVENT (OUTPATIENT)
Dept: OPERATING ROOM | Facility: HOSPITAL | Age: 79
End: 2024-10-25
Payer: MEDICARE

## 2024-10-25 ENCOUNTER — ANESTHESIA (OUTPATIENT)
Dept: OPERATING ROOM | Facility: HOSPITAL | Age: 79
End: 2024-10-25
Payer: MEDICARE

## 2024-10-25 ENCOUNTER — PRE-ADMISSION TESTING (OUTPATIENT)
Dept: PREADMISSION TESTING | Facility: HOSPITAL | Age: 79
End: 2024-10-25
Payer: MEDICARE

## 2024-10-25 VITALS
RESPIRATION RATE: 20 BRPM | HEIGHT: 68 IN | TEMPERATURE: 96.8 F | BODY MASS INDEX: 32.14 KG/M2 | HEART RATE: 61 BPM | DIASTOLIC BLOOD PRESSURE: 74 MMHG | SYSTOLIC BLOOD PRESSURE: 127 MMHG | OXYGEN SATURATION: 99 % | WEIGHT: 212.08 LBS

## 2024-10-25 VITALS
HEIGHT: 68 IN | OXYGEN SATURATION: 100 % | TEMPERATURE: 97.3 F | WEIGHT: 212.08 LBS | DIASTOLIC BLOOD PRESSURE: 70 MMHG | SYSTOLIC BLOOD PRESSURE: 151 MMHG | RESPIRATION RATE: 16 BRPM | BODY MASS INDEX: 32.14 KG/M2 | HEART RATE: 55 BPM

## 2024-10-25 DIAGNOSIS — G60.9 IDIOPATHIC PERIPHERAL NEUROPATHY: ICD-10-CM

## 2024-10-25 DIAGNOSIS — I73.9 PERIPHERAL VASCULAR DISEASE, UNSPECIFIED (CMS-HCC): ICD-10-CM

## 2024-10-25 DIAGNOSIS — G89.18 ACUTE POSTOPERATIVE PAIN: Primary | ICD-10-CM

## 2024-10-25 DIAGNOSIS — L89.613 STAGE III PRESSURE ULCER OF RIGHT HEEL (MULTI): ICD-10-CM

## 2024-10-25 PROCEDURE — 7100000002 HC RECOVERY ROOM TIME - EACH INCREMENTAL 1 MINUTE: Performed by: PODIATRIST

## 2024-10-25 PROCEDURE — 2500000004 HC RX 250 GENERAL PHARMACY W/ HCPCS (ALT 636 FOR OP/ED)

## 2024-10-25 PROCEDURE — 7100000001 HC RECOVERY ROOM TIME - INITIAL BASE CHARGE: Performed by: PODIATRIST

## 2024-10-25 PROCEDURE — 3700000002 HC GENERAL ANESTHESIA TIME - EACH INCREMENTAL 1 MINUTE: Performed by: PODIATRIST

## 2024-10-25 PROCEDURE — 87102 FUNGUS ISOLATION CULTURE: CPT | Performed by: PODIATRIST

## 2024-10-25 PROCEDURE — 87070 CULTURE OTHR SPECIMN AEROBIC: CPT | Performed by: PODIATRIST

## 2024-10-25 PROCEDURE — 2720000007 HC OR 272 NO HCPCS: Performed by: PODIATRIST

## 2024-10-25 PROCEDURE — 2500000004 HC RX 250 GENERAL PHARMACY W/ HCPCS (ALT 636 FOR OP/ED): Mod: JZ

## 2024-10-25 PROCEDURE — 7100000010 HC PHASE TWO TIME - EACH INCREMENTAL 1 MINUTE: Performed by: PODIATRIST

## 2024-10-25 PROCEDURE — 87206 SMEAR FLUORESCENT/ACID STAI: CPT | Mod: PARLAB | Performed by: PODIATRIST

## 2024-10-25 PROCEDURE — 3600000003 HC OR TIME - INITIAL BASE CHARGE - PROCEDURE LEVEL THREE: Performed by: PODIATRIST

## 2024-10-25 PROCEDURE — C1781 MESH (IMPLANTABLE): HCPCS | Performed by: PODIATRIST

## 2024-10-25 PROCEDURE — 3600000008 HC OR TIME - EACH INCREMENTAL 1 MINUTE - PROCEDURE LEVEL THREE: Performed by: PODIATRIST

## 2024-10-25 PROCEDURE — 3700000001 HC GENERAL ANESTHESIA TIME - INITIAL BASE CHARGE: Performed by: PODIATRIST

## 2024-10-25 PROCEDURE — 7100000009 HC PHASE TWO TIME - INITIAL BASE CHARGE: Performed by: PODIATRIST

## 2024-10-25 PROCEDURE — 2780000003 HC OR 278 NO HCPCS: Performed by: PODIATRIST

## 2024-10-25 PROCEDURE — 2500000004 HC RX 250 GENERAL PHARMACY W/ HCPCS (ALT 636 FOR OP/ED): Mod: JZ | Performed by: PODIATRIST

## 2024-10-25 DEVICE — IMPLANTABLE DEVICE: Type: IMPLANTABLE DEVICE | Site: HEEL | Status: FUNCTIONAL

## 2024-10-25 RX ORDER — ONDANSETRON HYDROCHLORIDE 2 MG/ML
INJECTION, SOLUTION INTRAVENOUS AS NEEDED
Status: DISCONTINUED | OUTPATIENT
Start: 2024-10-25 | End: 2024-10-25

## 2024-10-25 RX ORDER — PROPOFOL 10 MG/ML
INJECTION, EMULSION INTRAVENOUS AS NEEDED
Status: DISCONTINUED | OUTPATIENT
Start: 2024-10-25 | End: 2024-10-25

## 2024-10-25 RX ORDER — FENTANYL CITRATE 50 UG/ML
INJECTION, SOLUTION INTRAMUSCULAR; INTRAVENOUS AS NEEDED
Status: DISCONTINUED | OUTPATIENT
Start: 2024-10-25 | End: 2024-10-25

## 2024-10-25 RX ORDER — CEFAZOLIN SODIUM 2 G/100ML
INJECTION, SOLUTION INTRAVENOUS
Status: COMPLETED
Start: 2024-10-25 | End: 2024-10-25

## 2024-10-25 RX ORDER — LIDOCAINE HCL/PF 100 MG/5ML
SYRINGE (ML) INTRAVENOUS AS NEEDED
Status: DISCONTINUED | OUTPATIENT
Start: 2024-10-25 | End: 2024-10-25

## 2024-10-25 RX ORDER — DEXMEDETOMIDINE HYDROCHLORIDE 100 UG/ML
INJECTION, SOLUTION INTRAVENOUS AS NEEDED
Status: DISCONTINUED | OUTPATIENT
Start: 2024-10-25 | End: 2024-10-25

## 2024-10-25 RX ORDER — OXYCODONE AND ACETAMINOPHEN 5; 325 MG/1; MG/1
1 TABLET ORAL EVERY 6 HOURS PRN
Qty: 28 TABLET | Refills: 0 | Status: SHIPPED | OUTPATIENT
Start: 2024-10-25 | End: 2024-11-01

## 2024-10-25 RX ORDER — BUPIVACAINE HYDROCHLORIDE 5 MG/ML
INJECTION, SOLUTION EPIDURAL; INTRACAUDAL AS NEEDED
Status: DISCONTINUED | OUTPATIENT
Start: 2024-10-25 | End: 2024-10-25 | Stop reason: HOSPADM

## 2024-10-25 RX ORDER — CEFAZOLIN SODIUM 2 G/100ML
2 INJECTION, SOLUTION INTRAVENOUS ONCE
Status: COMPLETED | OUTPATIENT
Start: 2024-10-25 | End: 2024-10-25

## 2024-10-25 SDOH — HEALTH STABILITY: MENTAL HEALTH: CURRENT SMOKER: 0

## 2024-10-25 ASSESSMENT — DUKE ACTIVITY SCORE INDEX (DASI)
CAN YOU WALK A BLOCK OR TWO ON LEVEL GROUND: YES
CAN YOU WALK INDOORS, SUCH AS AROUND YOUR HOUSE: YES
CAN YOU PARTICIPATE IN STRENOUS SPORTS LIKE SWIMMING, SINGLES TENNIS, FOOTBALL, BASKETBALL, OR SKIING: NO
CAN YOU RUN A SHORT DISTANCE: NO
CAN YOU DO LIGHT WORK AROUND THE HOUSE LIKE DUSTING OR WASHING DISHES: YES
CAN YOU DO YARD WORK LIKE RAKING LEAVES, WEEDING OR PUSHING A MOWER: NO
CAN YOU TAKE CARE OF YOURSELF (EAT, DRESS, BATHE, OR USE TOILET): YES
CAN YOU PARTICIPATE IN MODERATE RECREATIONAL ACTIVITIES LIKE GOLF, BOWLING, DANCING, DOUBLES TENNIS OR THROWING A BASEBALL OR FOOTBALL: NO
CAN YOU DO MODERATE WORK AROUND THE HOUSE LIKE VACUUMING, SWEEPING FLOORS OR CARRYING GROCERIES: YES
CAN YOU CLIMB A FLIGHT OF STAIRS OR WALK UP A HILL: YES
DASI METS SCORE: 5.1
CAN YOU DO HEAVY WORK AROUND THE HOUSE LIKE SCRUBBING FLOORS OR LIFTING AND MOVING HEAVY FURNITURE: NO
TOTAL_SCORE: 18.95
CAN YOU HAVE SEXUAL RELATIONS: NO

## 2024-10-25 ASSESSMENT — COLUMBIA-SUICIDE SEVERITY RATING SCALE - C-SSRS
6. HAVE YOU EVER DONE ANYTHING, STARTED TO DO ANYTHING, OR PREPARED TO DO ANYTHING TO END YOUR LIFE?: NO
1. IN THE PAST MONTH, HAVE YOU WISHED YOU WERE DEAD OR WISHED YOU COULD GO TO SLEEP AND NOT WAKE UP?: NO
2. HAVE YOU ACTUALLY HAD ANY THOUGHTS OF KILLING YOURSELF?: NO

## 2024-10-25 ASSESSMENT — PAIN SCALES - GENERAL
PAINLEVEL_OUTOF10: 0 - NO PAIN
PAINLEVEL_OUTOF10: 1
PAINLEVEL_OUTOF10: 3
PAINLEVEL_OUTOF10: 0 - NO PAIN
PAIN_LEVEL: 0
PAINLEVEL_OUTOF10: 0 - NO PAIN

## 2024-10-25 ASSESSMENT — PAIN - FUNCTIONAL ASSESSMENT
PAIN_FUNCTIONAL_ASSESSMENT: 0-10

## 2024-10-25 ASSESSMENT — PAIN DESCRIPTION - DESCRIPTORS: DESCRIPTORS: ACHING

## 2024-10-25 NOTE — H&P
Surgical History & Physical    Patient Name and MRN: NAHUM ERAZO, [id number]  PCP: Elias Morley DO     HPI:  Nahum Erazo 78 y.o. male with PMHx noted below presents for non-healing right heel wound. He has agreed to pursue right heel wound debridement with skin substitute graft application and deep bone needle biopsy of right calcaneus.     Past Medical History:   Diagnosis Date    Arthritis     Heart valve disease     HL (hearing loss)     Hyperlipidemia     Hypertension     Joint pain     Nephrolithiasis     Personal history of other diseases of the circulatory system 03/09/2021    History of hypertension    Personal history of other diseases of the digestive system 03/09/2021    History of pancreatitis    Personal history of other diseases of the musculoskeletal system and connective tissue 03/09/2021    History of back pain    Personal history of other diseases of the musculoskeletal system and connective tissue 03/09/2021    History of back pain    Personal history of other endocrine, nutritional and metabolic disease 03/09/2021    History of hypercholesterolemia     Past Surgical History:   Procedure Laterality Date    CATARACT EXTRACTION      CERVICAL DISCECTOMY      COLONOSCOPY      HIP ARTHROPLASTY      KNEE ARTHROPLASTY      OTHER SURGICAL HISTORY  2008    Cervical vertebral fusion    OTHER SURGICAL HISTORY  2019    Colectomy    OTHER SURGICAL HISTORY  03/09/2021    Colonoscopy    OTHER SURGICAL HISTORY  03/09/2021    Skin biopsy    OTHER SURGICAL HISTORY  2015 Right; 2017 Left    Total Knee Replacement    ROTATOR CUFF REPAIR      SHOULDER SURGERY Left 2013    Shoulder Surgery    TOTAL HIP ARTHROPLASTY Right 06/2024     Family History   Problem Relation Name Age of Onset    Dementia Mother      Hypertension Mother      Other (Senile dementia) Mother      Dementia Father      Diabetes Father      Hypertension Father      Mitral valve prolapse Father      Heart attack Father      Other (Senile  "dementia) Father      Diabetes Brother       Social History     Tobacco Use    Smoking status: Former     Current packs/day: 0.00     Average packs/day: 2.0 packs/day for 15.0 years (30.0 ttl pk-yrs)     Types: Cigarettes, Cigars     Quit date: 2007     Years since quittin.1     Passive exposure: Past    Smokeless tobacco: Former    Tobacco comments:     Six, five, and eight non-smoking intervals   Vaping Use    Vaping status: Never Used   Substance Use Topics    Alcohol use: Yes     Alcohol/week: 10.0 standard drinks of alcohol     Types: 3 Glasses of wine, 6 Cans of beer, 1 Shots of liquor per week     Comment: 2 aday    Drug use: Never     Cannot display prior to admission medications because the patient has not been admitted in this contact.         REVIEW OF SYSTEMS:  Constitutional:  Negative for fatigue and fever.   Respiratory:  Negative for cough, chest tightness and shortness of breath.    Cardiovascular:  Negative for chest pain, palpitations and leg swelling.   Gastrointestinal:  Negative for abdominal pain, diarrhea and nausea.   Skin: Positive for right heel wound.  Psychiatric/Behavioral: Negative.         PHYSICAL EXAM:  Patient Vitals for the past 24 hrs:   BP Temp Temp src Pulse Resp SpO2 Height Weight   10/25/24 1236 142/70 36.3 °C (97.3 °F) Temporal 60 20 100 % 1.727 m (5' 8\") 96.2 kg (212 lb 1.3 oz)       Constitutional:       Appearance: Normal appearance.   Cardiovascular:      Rate and Rhythm: Normal rate and regular rhythm.      Pulses: Normal pulses.   Pulmonary:      Effort: Pulmonary effort is normal.      Breath sounds: Normal breath sounds.   Abdominal:      General: Abdomen is flat. Bowel sounds are normal.      Palpations: Abdomen is soft.   Musculoskeletal:         General: Normal range of motion.   Neurological:      Mental Status: Patient is alert.        ASSESSMENT:  78 y.o. male  presenting with non-healing right heel wound, which has been unrelieved by conservative " treatment. Imaging has shown some bone marrow edema in the posterolateral calcaneus, with adjacent soft tissue defect.    PLAN:  Patient was educated on the details of the procedure as well as medically reasonable risks, benefits, alternatives and prognosis. Patient was educated to their apparent understanding on potential complications including but not limited to infection, recurrence, persistent pain, swelling, disability, nerve injury/entrapment, healing complications, complications with anesthesia, and deep vein thrombosis/pulmonary embolism. All questions were answered to the patient’s apparent satisfaction.  No guarantees were given as to the outcome of the surgery. Patient was consented for Right heel deep needle bone biopsy, right heel wound bed preparation, and application of skin substitute graft to right heel..    ERAS patient?: No    COVID-19 Risk Consent:   Surgeon has reviewed the key risks related to sky COVID-19 and subsequent sequelae.     10/25/24 at 1:33 PM - Edy Ness DPM

## 2024-10-25 NOTE — OP NOTE
PODIATRIC OPERATIVE REPORT    LOG ID: [id number]  SURGERY/PROCEDURE DATE: 10/25/2024  OR LOCATION: [facility]    SURGEON(S)/PROCEDURALIST(S) AND ASSISTANT(S):  Primary: Elias Cisneros DPM  Assisting: Edy Ness DPM PGY-3; Dmitriy Richards DPM PGY-3    OTHER OR STAFF:  Circulator: Edgardo Pastor RN  Relief Circulator: Radha Anne RN  Scrub Person: Lilian Harris RN    SURGERY/PROCEDURE(S):  1) RIGHT CALCANEUS DEEP BONE BIOPSY  20225 - IL BIOPSY BONE TROCAR/NEEDLE DEEP    2) RIGHT HEEL WOUND DEBRIDEMENT WITH SITE PREP  76679 - IL PREP SITE FOOT 1ST 100 SQT CM/1PCT    3) SKIN SUBSTITUTE GRAFT APPLICATION  40120 - IL SUB GRFT F/S/N/H/F/G/M/D <100SQ CM 1ST 25 SQ CM    IL PREP SITE TRUNK/ARM/LEG 1ST 100 SQ CM/1PCT [10969]  PRE-OP/PRE-PROCEDURE DIAGNOSIS:   Pre-op Diagnosis      * Stage III pressure ulcer of right heel (Multi) [L89.613]     * Peripheral vascular disease, unspecified (CMS-HCC) [I73.9]     * Idiopathic peripheral neuropathy [G60.9]    POST-OP/POST-PROCEDURE DIAGNOSIS: Same as Pre-Op    ANESTHESIA:   Anesthesia: General  ASA: ASA status not filed in the log.  Anesthesia Staff: Anesthesiologist: Elias Diaz MD  CRNA: GOOD Matthews-MACO, SURESH  SRNA: Alfredo Brice  Intra-op Medications:   Administrations occurring from 1330 to 1535 on 10/25/24:   Medication Name Total Dose   bupivacaine PF 0.5 % (Marcaine) 0.5 % (5 mg/mL) injection 10 mL   ceFAZolin (Ancef) 2 g in dextrose (iso)  mL 2 g   ceFAZolin (Ancef) in dextrose (iso) IV  - Omnicell Override Pull Cannot be calculated   dexAMETHasone (Decadron) injection 4 mg/mL 4 mg   dexmedeTOMIDine (Precedex) 100 mcg/mL 2 mL single dose vial 8 mcg   fentaNYL (Sublimaze) injection 50 mcg/mL 100 mcg   LR bolus Cannot be calculated   lidocaine (cardiac) injection 2% prefilled syringe 50 mg   ondansetron (Zofran) 2 mg/mL injection 4 mg   propofol (Diprivan) injection 10 mg/mL 100 mg       ESTIMATED BLOOD LOSS: less than 50 mL    SPECIMENS:    Order Name Source Comment Collection Info Order Time   AFB CULTURE/SMEAR BONE RESECTION Pre-op diagnosis:  L89.612 Rt. heel pressure ulcer  M86.171 Collected By: Elias Cisneros DPM 10/25/2024  2:28 PM     Release result to MyChart   Immediate        FUNGAL CULTURE/SMEAR BONE RESECTION Pre-op diagnosis:  L89.612 Rt. heel pressure ulcer  M86.171 Collected By: Elias Cisneros DPM 10/25/2024  2:28 PM     Release result to MyChart   Immediate        TISSUE/WOUND CULTURE/SMEAR BONE RESECTION Pre-op diagnosis:  L89.612 Rt. heel pressure ulcer  M86.171 Collected By: Elias Cisneros DPM 10/25/2024  2:28 PM     Release result to MyChart   Immediate            IMPLANTABLE DEVICES:  Implant Name Type Inv. Item Serial No.  Lot No. LRB No. Used Action   ALLOGRAFT, DERMAL, DECELULARIZED, 1 X 2CM - [id number] - [id number] Graft ALLOGRAFT, DERMAL, DECELULARIZED, 1 X 2CM [id number] TISSUE REGENIX  Right 1 Implanted       FINDINGS: There were no signs of infection. No purulence, fluctuance, or abscess. No necrosis. Good bleeding from bone and soft tissue. Intraoperative findings otherwise consistent with clinical and radiographic findings.    DRAINS: None    TOURNIQUET TIMES:   N/A    COMPLICATIONS: None; patient tolerated the procedure well.       SURGERY/PROCEDURE DETAILS:  INDICATIONS FOR PROCEDURE:   The patient with diagnosis as outlined above presents for podiatric surgical intervention today. The patient has attempted and failed conservative treatment as outlined in preoperative clinic notes and wishes to proceed with surgical intervention at this time. The nature of the problems, anticipated procedures, recovery/convalescence, risks/complications including but not limited to numbness, CRPS, over/under correction, problems healing of soft tissue or bone, postoperative wound infection, wound dehiscence, DVT and/or persistent pain/disability have been explained to the patient in detail. An updated  H&P and consent have been completed prior to today’s surgical intervention. The patient states that they have been NPO since midnight. No guarantees were given or implied, but it is expected that the patient will have a favorable outcome.  It is with this understanding that we proceed.     PRE-PROCEDURE INFORMATION:  In pre-op holding area, the Right Lower extremity to be operated on was clearly marked and the patient verified correct laterality of the marking.  The patient was brought to the operating room and placed on the operating table in the supine position.  A timeout was performed in which identification of the correct patient, procedure, location, and materials was done. A well-padded pneumatic 24 inch tourniquet was placed on the patient's right ankle, but ws not inflated. Following general sedation. The right foot was then scrubbed, prepped and draped in the usual aseptic manner.     DESCRIPTION OF PROCEDURES:     1) RIGHT CALCANEUS DEEP BONE BIOPSY [97202]  Attention was directed to the right posterior heel wound. The wound was irrigated with copious amounts of normal saline. Jamshidi needle with trocar was inserted into the lateral border of the wound bed and advanced until the posterior-inferior-lateral aspect of the calcaneus was reached. Mallet was used to penetrate the cortex, and the trocar was removed. Jamshidi was advanced 6mm into the calcaneus and small cylindrical bone specimen was collected. This specimen was passed from the operative field to be prepared for microbiology with gram stain, aerobic, anaerobic, and fungal cultures.     2) RIGHT HEEL WOUND DEBRIDEMENT WITH SITE PREP [39666]  Attention was redirected to the wound. Using a dermal curette, the wound bed was excisionally debrided of nonviable fibrous tissue and healthy bleeding was noted. The wound was again irrigated with copious amounts of normal saline. Hemostasis was obtained with manual pressure. The site was noted to be  prepared for graft application.    3) SKIN SUBSTITUTE GRAFT APPLICATION [46655]  The DermaPure de-cellularized dermal allograft 1cm x 2cm was prepared and carefully applied over the wound bed. The allograft was sutured in place with 3-0 Monocryl in a simple interrupted fashion. Excellent contact with the wound bed was noted.     Dressing was placed on the surgical extremity consisting of Adaptic, saline-moistened 4x4s, specialist cast padding, and a con-compressive layer of Coban. The patient was placed in a flat-sole surgical shoe.     POSTOPERATIVE INFORMATION: The patient tolerated the above noted procedure and anesthesia well and was transferred to the PACU with vital signs stable, and vascular status intact with capillary refill intact to the most distal aspect of the operative extremity. Postoperative instructions reviewed in detail with the patient with written instructions provided. Patient will return to wound center in approximately 3-5 days for first postoperative visit. Patient has the number of the clinic and was instructed to call prior to that time should any problems, questions, or concerns arise.    This is Edy Ness DPM PGY-3 dictating the operative note for Elias Cisneros DPM.        SIGNATURE: Edy Ness DPM PATIENT NAME: Kit Franklin   DATE: October 25, 2024 MRN: [id number]   TIME: 2:59 PM

## 2024-10-25 NOTE — ANESTHESIA PROCEDURE NOTES
Airway  Date/Time: 10/25/2024 2:02 PM  Urgency: elective    Airway not difficult    Staffing  Performed: SRNA   Authorized by: Elias Diaz MD    Performed by: Alfredo Brice  Patient location during procedure: OR    Indications and Patient Condition  Indications for airway management: anesthesia  Spontaneous Ventilation: absent  Sedation level: deep  Preoxygenated: yes  Patient position: sniffing  Mask difficulty assessment: 1 - vent by mask    Final Airway Details  Final airway type: supraglottic airway      Successful airway: Supraglottic airway: IGEL.  Size 4     Number of attempts at approach: 1    Additional Comments  Lips and teeth in preanesthetic condition

## 2024-10-25 NOTE — ANESTHESIA POSTPROCEDURE EVALUATION
Patient: Kit Franklin    Procedure Summary       Date: 10/25/24 Room / Location: [facility] 04 / [facility]    Anesthesia Start: 1356 Anesthesia Stop: 1452    Procedures:       RIGHT CALCANEUS DEEP BONE BIOPSY (Right: Foot)      RIGHT HEEL WOUND DEBRIDEMENT WITH SITE PREP (Right: Foot)      & GRAFT APPLICATION (Right: Foot) Diagnosis:       Stage III pressure ulcer of right heel (Multi)      Peripheral vascular disease, unspecified (CMS-HCC)      Idiopathic peripheral neuropathy      (L89.612 Rt. heel pressure ulcer)      (M86.171)    Surgeons: Elias Cisneros DPM Responsible Provider: Elias Diaz MD    Anesthesia Type: general ASA Status: Not recorded            Anesthesia Type: general    Vitals Value Taken Time   /81 10/25/24 1452   Temp 36 10/25/24 1452   Pulse 57 10/25/24 1452   Resp 14 10/25/24 1452   SpO2 100 10/25/24 1452       Anesthesia Post Evaluation    Patient location during evaluation: PACU  Patient participation: complete - patient participated  Level of consciousness: awake and alert  Pain score: 0  Pain management: adequate  Multimodal analgesia pain management approach  Airway patency: patent  Two or more strategies used to mitigate risk of obstructive sleep apnea  Cardiovascular status: acceptable and hemodynamically stable  Respiratory status: acceptable and face mask  Hydration status: acceptable  Postoperative Nausea and Vomiting: none      There were no known notable events for this encounter.

## 2024-10-25 NOTE — CPM/PAT H&P
[facility]/PAT Evaluation       Name: Kit Franklin (Kit Franklin)  /Age: 1945/78 y.o.     In-Person       Chief Complaint:     The patient is 78 years old male with a past medical history of Arthritis, HL (hearing loss), Hyperlipidemia, Hypertension, Nephrolithiasis, and systolic murmur here in the PAT clinic for an upcoming right calcaneus deep bone biopsy scheduled on 10/25/2024. He had a right total hip replacement on 2024, due to clinical pain. Eleven days after the hip replacement, he experienced a fall that caused a bruise, wound, or blister on his right heel. The wound has not been healing well despite wound care.    On August 15, 2024, he underwent a right femoral endarterectomy with patch angioplasty and transluminal angioplasty, as well as the placement of a covered stent in the right common iliac artery due to poor blood flow to the lower extremities. The patient reports that he is managing his wound care at home using a wound spray and bandages, with yellowish drainage from the wound. He denies having fever, chills, or nausea, and has no past issues with anesthesia.    Vascular Surgery: Gilles Brown DO last seen on 2024   Primary Care Elias Morley DO last seen on 2024       HTN/HLD/systolic murmur  Denies CP/pressure/palpitations  ECG reviewed from 2024    PAD  Follows Dr. Brown-  Takes Plavix and ASA          Past Medical History:   Diagnosis Date    Arthritis     Heart valve disease     HL (hearing loss)     Hyperlipidemia     Hypertension     Joint pain     Nephrolithiasis     Personal history of other diseases of the circulatory system 2021    History of hypertension    Personal history of other diseases of the digestive system 2021    History of pancreatitis    Personal history of other diseases of the musculoskeletal system and connective tissue 2021    History of back pain    Personal history of other diseases of the musculoskeletal system  and connective tissue 03/09/2021    History of back pain    Personal history of other endocrine, nutritional and metabolic disease 03/09/2021    History of hypercholesterolemia       Past Surgical History:   Procedure Laterality Date    CATARACT EXTRACTION      CERVICAL DISCECTOMY      COLONOSCOPY      HIP ARTHROPLASTY      KNEE ARTHROPLASTY      OTHER SURGICAL HISTORY  2008    Cervical vertebral fusion    OTHER SURGICAL HISTORY  2019    Colectomy    OTHER SURGICAL HISTORY  03/09/2021    Colonoscopy    OTHER SURGICAL HISTORY  03/09/2021    Skin biopsy    OTHER SURGICAL HISTORY  2015 Right; 2017 Left    Total Knee Replacement    ROTATOR CUFF REPAIR      SHOULDER SURGERY Left 2013    Shoulder Surgery    TOTAL HIP ARTHROPLASTY Right 06/2024       Patient  reports that he is not currently sexually active and has had partner(s) who are female. He reports using the following method of birth control/protection: Male Sterilization.    Family History   Problem Relation Name Age of Onset    Dementia Mother      Hypertension Mother      Other (Senile dementia) Mother      Dementia Father      Diabetes Father      Hypertension Father      Mitral valve prolapse Father      Heart attack Father      Other (Senile dementia) Father      Diabetes Brother         Allergies   Allergen Reactions    Lisinopril Cough    Meperidine Nausea/vomiting       Prior to Admission medications    Medication Sig Start Date End Date Taking? Authorizing Provider   aspirin 81 mg EC tablet Take 1 tablet (81 mg) by mouth once daily. 8/16/24  Yes Gilles Brown, DO   clopidogrel (Plavix) 75 mg tablet Take 1 tablet (75 mg) by mouth once daily. Per Dr Brown to hold toda resume after procedure   Yes Historical Provider, MD   furosemide (Lasix) 20 mg tablet Take 1-2 tablets (20-40 mg) by mouth once daily. 7/2/24 10/30/24 Yes Elias Morley, DO   metoprolol succinate XL (Toprol-XL) 50 mg 24 hr tablet Take 1 tablet (50 mg) by mouth once daily. 12/7/23  12/6/24 Yes Elias Morley, DO   multivitamin with minerals tablet Take 1 tablet by mouth once daily.   Yes Historical Provider, MD   potassium chloride CR (Klor-Con) 10 mEq ER tablet Take 2 tablets (20 mEq) by mouth once daily. Do not crush or chew. 7/2/24 10/30/24 Yes Elias Morley DO   simvastatin (Zocor) 20 mg tablet Take 1 tablet (20 mg) by mouth once daily. 4/26/24  Yes Elias Morley, DO   tamsulosin (Flomax) 0.4 mg 24 hr capsule Take 1 capsule (0.4 mg) by mouth once daily. 8/13/24  Yes Elias Morley, DO   simethicone (Mylicon) 80 mg chewable tablet Chew 0.5 tablets (40 mg) 4 times a day as needed for flatulence. 6/10/24   Morro Merrill, DO   acetaminophen (Tylenol) 325 mg tablet Take 2 tablets (650 mg) by mouth every 6 hours. 6/5/24 10/24/24  Jen Rosen, APRN-CNS   amoxicillin-pot clavulanate (Augmentin) 875-125 mg tablet Take 1 tablet (875 mg) by mouth 2 times a day. Take for 10 days  10/24/24  Historical Provider, MD   cyclobenzaprine (Flexeril) 5 mg tablet Take by mouth.  10/24/24  Historical Provider, MD        Review of Systems  Constitutional: NO F, chills, or sweats  Eyes: no blurred vision or visual disturbance  ENT: denies congestion, sore throat, difficulty hearing  Cardiovascular: no chest pain, no edema, no palps and no syncope.   Respiratory: no cough,no s.o.b. and no wheezing  Gastrointestinal: no abdominal pain, no N/V, no blood in stools  Genitourinary: no dysuria, no urinary frequency, no urinary hesitancy and no feelings of urinary urgency.   Musculoskeletal: no arthralgias,  no back pain and no myalgias.   Integumentary: See HPI, right heel wound not observed, no new skin lesions and no rashes.   Neurological: + difficulty walking, + neuropathy, no headache, no limb weakness  Endocrine: no recent weight gain and no recent weight loss.   Hematologic/Lymphatic: no tendency for easy bruising and no swollen glands.        Physical Exam  Constitutional:       Appearance:  Normal appearance.   Cardiovascular:      Rate and Rhythm: Normal rate.      Heart sounds: Murmur heard.      Systolic murmur is present with a grade of 1/6.   Pulmonary:      Effort: Pulmonary effort is normal.      Breath sounds: Normal breath sounds.   Abdominal:      General: Bowel sounds are normal.      Palpations: Abdomen is soft.   Musculoskeletal:         General: Normal range of motion.      Cervical back: Normal range of motion.      Right lower leg: No edema.      Left lower leg: No edema.   Skin:     General: Skin is warm and dry.      Findings: Bruising (scattered on bilateral UE) present.   Neurological:      Mental Status: He is alert and oriented to person, place, and time.          PAT AIRWAY:   Airway:     Mallampati::  II    TM distance::  <3 FB    Neck ROM::  Full   upper dentures and lower dentures      Visit Vitals  /74   Pulse 61   Temp 36 °C (96.8 °F) (Temporal)   Resp 20       DASI Risk Score      Flowsheet Row Pre-Admission Testing from 10/25/2024 in [facility] Pre-Admission Testing from 8/12/2024 in [facility]   Can you take care of yourself (eat, dress, bathe, or use toilet)?  2.75 filed at 10/25/2024 1143 2.75 filed at 08/12/2024 1430   Can you walk indoors, such as around your house? 1.75 filed at 10/25/2024 1143 1.75 filed at 08/12/2024 1430   Can you walk a block or two on level ground?  2.75 filed at 10/25/2024 1143 0 filed at 08/12/2024 1430   Can you climb a flight of stairs or walk up a hill? 5.5 filed at 10/25/2024 1143 5.5 filed at 08/12/2024 1430   Can you run a short distance? 0 filed at 10/25/2024 1143 0 filed at 08/12/2024 1430   Can you do light work around the house like dusting or washing dishes? 2.7 filed at 10/25/2024 1143 2.7 filed at 08/12/2024 1430   Can you do moderate work around the house like vacuuming, sweeping floors or carrying groceries? 3.5 filed at 10/25/2024 1143 0 filed at 08/12/2024 1430   Can you do heavy work around the  house like scrubbing floors or lifting and moving heavy furniture?  0 filed at 10/25/2024 1143 0 filed at 08/12/2024 1430   Can you do yard work like raking leaves, weeding or pushing a mower? 0 filed at 10/25/2024 1143 0 filed at 08/12/2024 1430   Can you have sexual relations? 0 filed at 10/25/2024 1143 0 filed at 08/12/2024 1430   Can you participate in moderate recreational activities like golf, bowling, dancing, doubles tennis or throwing a baseball or football? 0 filed at 10/25/2024 1143 0 filed at 08/12/2024 1430   Can you participate in strenous sports like swimming, singles tennis, football, basketball, or skiing? 0 filed at 10/25/2024 1143 0 filed at 08/12/2024 1430   DASI SCORE 18.95 filed at 10/25/2024 1143 12.7 filed at 08/12/2024 1430   METS Score (Will be calculated only when all the questions are answered) 5.1 filed at 10/25/2024 1143 4.3 filed at 08/12/2024 1430          Caprini DVT Assessment      Flowsheet Row Admission (Discharged) from 8/15/2024 in [facility] 8 with Gilles Brown DO ED to Hosp-Admission (Discharged) from 6/7/2024 in [facility] 3 with Morro Merrill DO and Gema Sawyer DO   DVT Score 3 filed at 08/16/2024 1357 3 filed at 06/07/2024 2004   BMI 30 or less filed at 08/16/2024 1357 --   RETIRED: Age 60-75 years filed at 08/16/2024 1357 Over 75 years filed at 06/07/2024 2004          Modified Frailty Index    No data to display       CHADS2 Stroke Risk  Current as of 4 hours ago        N/A 3 to 100%: High Risk   2 to < 3%: Medium Risk   0 to < 2%: Low Risk     Last Change: N/A          This score determines the patient's risk of having a stroke if the patient has atrial fibrillation.        This score is not applicable to this patient. Components are not calculated.          Revised Cardiac Risk Index    No data to display       Apfel Simplified Score    No data to display       Risk Analysis Index Results This Encounter    No data found in the last 10  encounters.       Stop Bang Score      Flowsheet Row Pre-Admission Testing from 10/25/2024 in [facility] Admission (Discharged) from 8/15/2024 in [facility] 8 with Gilles Brown, DO   Do you snore loudly? 1 filed at 10/25/2024 1143 0 filed at 08/15/2024 1209   Do you often feel tired or fatigued after your sleep? 0 filed at 10/25/2024 1143 0 filed at 08/15/2024 1209   Has anyone ever observed you stop breathing in your sleep? 0 filed at 10/25/2024 1143 0 filed at 08/15/2024 1209   Do you have or are you being treated for high blood pressure? 1 filed at 10/25/2024 1143 0 filed at 08/15/2024 1209   Recent BMI (Calculated) 32.3 filed at 10/25/2024 1143 31.5 filed at 08/15/2024 1209   Is BMI greater than 35 kg/m2? 0=No filed at 10/25/2024 1143 0=No filed at 08/15/2024 1209   Age older than 50 years old? 1=Yes filed at 10/25/2024 1143 1=Yes filed at 08/15/2024 1209   Is your neck circumference greater than 17 inches (Male) or 16 inches (Female)? 1 filed at 10/25/2024 1143 --   Gender - Male 1=Yes filed at 10/25/2024 1143 1=Yes filed at 08/15/2024 1209   STOP-BANG Total Score 5 filed at 10/25/2024 1143 --          Prodigy: High Risk  Total Score: 20              Prodigy Age Score      Prodigy Gender Score          ARISCAT Score for Postoperative Pulmonary Complications    No data to display       Shaver Perioperative Risk for Myocardial Infarction or Cardiac Arrest (ELFEGO)    No data to display         ASSESSMENT  Obesity BMI 32.25    HTN/HLD  Takes Metoprolol, simvastatin  ECG 8/12/2024-  NSR with RBBB, 79 bpm    PAD/ chronic right heel wound  S/P Right common femoral endarterectomy with patch PTCA; translumincal PTCA with stent to right common iliac artery done 8/15/2024  Takes Plavix/ ASA  On hold for procedure-  10/24/2024     Continues to follow Wound clinic for dressing changes       BPH/ Nephrolithiasis  Takes flomax  Stable    ANESTHESIA FINDINGS:  Intubation History: No history of  difficult intubation  Significant Anesthesia Considerations:      Airway History: No abnormal airway history  Predictors of Difficult Airway Management  ? Obesity          CONSULTS:    Patient does not require consults for optimization at this time.     The Following Tests/Procedures Have Been Initiated and Reviewed/ interpreted by me:   CBC, BMP reviewed from 8/16/2024    ECG reviewed from 8/12/2024     Planned Anesthetic: general     Instructions Given to Patient:  *Reviewed Medications to be taken in AM of surgery or held  Patient given verbal and written preop instructions and voices comprehension and compliance.     No further testing required.      PLAN  This patient is optimally prepared for surgery.

## 2024-10-25 NOTE — ANESTHESIA PREPROCEDURE EVALUATION
Patient: Kit Franklin    Procedure Information       Date/Time: 10/25/24 1330    Procedures:       RIGHT CALCANEUS DEEP BONE BIOPSY (Right)      RIGHT HEEL WOUND DEBRIDEMENT WITH SITE PREP (Right)      & GRAFT APPLICATION (Right)    Location: [facility] 04 / [facility]    Surgeons: Elias Cisneros DPM            Relevant Problems   Anesthesia (within normal limits)      Cardiac   (+) Benign essential HTN   (+) Hyperlipemia   (+) PAD (peripheral artery disease) (CMS-HCC)   (+) RBBB   (+) Systolic murmur      Neuro   (+) Lumbar radiculopathy      GI   (+) Irritable bowel syndrome with diarrhea      Endocrine   (+) Obesity      Musculoskeletal   (+) Facet degeneration of lumbar region   (+) Osteoarthritis   (+) Primary osteoarthritis of right hip      HEENT   (+) Bilateral hearing loss      ID   (+) COVID-19       Clinical information reviewed:    Allergies  Meds               NPO Detail:  NPO/Void Status  Carbohydrate Drink Given Prior to Surgery? 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  Patient was previously instructed to abstain from smoking on day of procedure.  Patient did not smoke on day of procedure.    intravenous induction   Postoperative administration of opioids is intended.  Trial extubation is planned.  Anesthetic plan and risks discussed with patient.  Use of blood products discussed with patient who  consented to blood products.    Plan discussed with CAA and CRNA.

## 2024-10-26 LAB
ACID FAST STN SPEC: NORMAL
MYCOBACTERIUM SPEC CULT: NORMAL

## 2024-10-27 LAB
BACTERIA SPEC CULT: NORMAL
GRAM STN SPEC: NORMAL
GRAM STN SPEC: NORMAL

## 2024-10-28 LAB
BACTERIA SPEC CULT: NORMAL
GRAM STN SPEC: NORMAL
GRAM STN SPEC: NORMAL

## 2024-10-29 ENCOUNTER — OFFICE VISIT (OUTPATIENT)
Dept: WOUND CARE | Facility: CLINIC | Age: 79
End: 2024-10-29
Payer: MEDICARE

## 2024-10-29 PROCEDURE — 99213 OFFICE O/P EST LOW 20 MIN: CPT

## 2024-10-30 LAB
ACID FAST STN SPEC: NORMAL
FUNGUS SPEC CULT: NORMAL
FUNGUS SPEC FUNGUS STN: NORMAL
MYCOBACTERIUM SPEC CULT: NORMAL

## 2024-11-04 LAB
FUNGUS SPEC CULT: NORMAL
FUNGUS SPEC FUNGUS STN: NORMAL

## 2024-11-05 ENCOUNTER — OFFICE VISIT (OUTPATIENT)
Dept: WOUND CARE | Facility: CLINIC | Age: 79
End: 2024-11-05
Payer: MEDICARE

## 2024-11-05 PROCEDURE — 99213 OFFICE O/P EST LOW 20 MIN: CPT

## 2024-11-06 ENCOUNTER — APPOINTMENT (OUTPATIENT)
Dept: VASCULAR SURGERY | Facility: CLINIC | Age: 79
End: 2024-11-06
Payer: MEDICARE

## 2024-11-06 LAB
ACID FAST STN SPEC: NORMAL
MYCOBACTERIUM SPEC CULT: NORMAL

## 2024-11-11 LAB
FUNGUS SPEC CULT: NORMAL
FUNGUS SPEC FUNGUS STN: NORMAL

## 2024-11-13 LAB
ACID FAST STN SPEC: NORMAL
MYCOBACTERIUM SPEC CULT: NORMAL

## 2024-11-14 ENCOUNTER — APPOINTMENT (OUTPATIENT)
Dept: PRIMARY CARE | Facility: CLINIC | Age: 79
End: 2024-11-14
Payer: MEDICARE

## 2024-11-14 VITALS
BODY MASS INDEX: 32.34 KG/M2 | DIASTOLIC BLOOD PRESSURE: 72 MMHG | HEIGHT: 68 IN | WEIGHT: 213.4 LBS | OXYGEN SATURATION: 99 % | HEART RATE: 62 BPM | SYSTOLIC BLOOD PRESSURE: 134 MMHG

## 2024-11-14 DIAGNOSIS — Z12.5 PROSTATE CANCER SCREENING: ICD-10-CM

## 2024-11-14 DIAGNOSIS — R09.89 BILATERAL CAROTID BRUITS: ICD-10-CM

## 2024-11-14 DIAGNOSIS — G57.91 NEUROPATHY OF RIGHT LOWER EXTREMITY: ICD-10-CM

## 2024-11-14 DIAGNOSIS — R01.1 HEART MURMUR: ICD-10-CM

## 2024-11-14 DIAGNOSIS — E78.5 DYSLIPIDEMIA: ICD-10-CM

## 2024-11-14 DIAGNOSIS — I73.9 PAD (PERIPHERAL ARTERY DISEASE) (CMS-HCC): ICD-10-CM

## 2024-11-14 DIAGNOSIS — R73.9 HYPERGLYCEMIA: Primary | ICD-10-CM

## 2024-11-14 PROBLEM — M25.559 ARTHRALGIA OF HIP: Status: ACTIVE | Noted: 2023-01-31

## 2024-11-14 PROBLEM — Z86.39 HISTORY OF HYPERCHOLESTEROLEMIA: Status: ACTIVE | Noted: 2024-11-14

## 2024-11-14 PROBLEM — D48.5 NEOPLASM OF UNCERTAIN BEHAVIOR OF SKIN OF EAR: Status: ACTIVE | Noted: 2018-04-30

## 2024-11-14 PROBLEM — E78.00 PURE HYPERCHOLESTEROLEMIA, UNSPECIFIED: Status: ACTIVE | Noted: 2024-06-06

## 2024-11-14 PROBLEM — D18.01 HEMANGIOMA OF SKIN AND SUBCUTANEOUS TISSUE: Status: ACTIVE | Noted: 2021-12-02

## 2024-11-14 PROBLEM — C44.219 BASAL CELL CARCINOMA (BCC) OF ANTIHELIX OF LEFT EAR: Status: ACTIVE | Noted: 2018-04-30

## 2024-11-14 PROBLEM — G54.1 LUMBOSACRAL PLEXUS LESION: Status: ACTIVE | Noted: 2024-04-01

## 2024-11-14 PROBLEM — L89.892 PRESSURE ULCER OF OTHER SITE, STAGE 2 (MULTI): Status: ACTIVE | Noted: 2024-11-14

## 2024-11-14 PROBLEM — I70.234: Status: ACTIVE | Noted: 2024-11-14

## 2024-11-14 PROBLEM — G89.29 CHRONIC BILATERAL THORACIC BACK PAIN: Status: ACTIVE | Noted: 2017-04-12

## 2024-11-14 PROBLEM — M54.2 CERVICALGIA: Status: ACTIVE | Noted: 2017-04-12

## 2024-11-14 PROBLEM — I10 HTN (HYPERTENSION): Status: ACTIVE | Noted: 2024-06-06

## 2024-11-14 PROBLEM — N20.1 CALCULUS OF URETER: Status: ACTIVE | Noted: 2024-11-14

## 2024-11-14 PROBLEM — L89.613 PRESSURE ULCER OF RIGHT HEEL, STAGE 3 (MULTI): Status: ACTIVE | Noted: 2024-11-14

## 2024-11-14 PROBLEM — H61.20 IMPACTED CERUMEN: Status: ACTIVE | Noted: 2024-11-14

## 2024-11-14 PROBLEM — K59.04 CHRONIC IDIOPATHIC CONSTIPATION: Status: ACTIVE | Noted: 2017-04-12

## 2024-11-14 PROBLEM — M54.6 CHRONIC BILATERAL THORACIC BACK PAIN: Status: ACTIVE | Noted: 2017-04-12

## 2024-11-14 PROBLEM — C44.319 BASAL CELL CARCINOMA OF SKIN OF OTHER PARTS OF FACE: Status: ACTIVE | Noted: 2022-02-11

## 2024-11-14 PROBLEM — L57.8 OTHER SKIN CHANGES DUE TO CHRONIC EXPOSURE TO NONIONIZING RADIATION: Status: ACTIVE | Noted: 2022-02-11

## 2024-11-14 PROBLEM — L57.0 ACTINIC KERATOSIS: Status: ACTIVE | Noted: 2021-12-02

## 2024-11-14 PROBLEM — L40.50 PSORIASIS WITH ARTHROPATHY (MULTI): Status: ACTIVE | Noted: 2017-04-12

## 2024-11-14 PROBLEM — K86.89 PANCREATIC MASS (HHS-HCC): Status: ACTIVE | Noted: 2017-05-05

## 2024-11-14 PROBLEM — D13.5 AMPULLARY ADENOMA: Status: ACTIVE | Noted: 2024-11-14

## 2024-11-14 PROBLEM — D69.6 THROMBOCYTOPENIA, UNSPECIFIED (CMS-HCC): Status: ACTIVE | Noted: 2024-11-14

## 2024-11-14 PROBLEM — J30.0 VASOMOTOR RHINITIS: Status: ACTIVE | Noted: 2024-11-14

## 2024-11-14 PROBLEM — K58.2 MIXED IRRITABLE BOWEL SYNDROME: Status: ACTIVE | Noted: 2023-01-31

## 2024-11-14 PROBLEM — Z86.79 HISTORY OF HYPERTENSION: Status: ACTIVE | Noted: 2024-11-14

## 2024-11-14 PROBLEM — D22.5 MELANOCYTIC NEVI OF TRUNK: Status: ACTIVE | Noted: 2021-12-02

## 2024-11-14 PROBLEM — Z96.652 S/P TOTAL KNEE ARTHROPLASTY, LEFT: Status: ACTIVE | Noted: 2018-04-30

## 2024-11-14 LAB — HBA1C MFR BLD: 5.6 % (ref 4.2–6.5)

## 2024-11-14 PROCEDURE — 1036F TOBACCO NON-USER: CPT | Performed by: FAMILY MEDICINE

## 2024-11-14 PROCEDURE — 99213 OFFICE O/P EST LOW 20 MIN: CPT | Performed by: FAMILY MEDICINE

## 2024-11-14 PROCEDURE — 1170F FXNL STATUS ASSESSED: CPT | Performed by: FAMILY MEDICINE

## 2024-11-14 PROCEDURE — G0439 PPPS, SUBSEQ VISIT: HCPCS | Performed by: FAMILY MEDICINE

## 2024-11-14 PROCEDURE — 90677 PCV20 VACCINE IM: CPT | Performed by: FAMILY MEDICINE

## 2024-11-14 PROCEDURE — 1125F AMNT PAIN NOTED PAIN PRSNT: CPT | Performed by: FAMILY MEDICINE

## 2024-11-14 PROCEDURE — G0008 ADMIN INFLUENZA VIRUS VAC: HCPCS | Performed by: FAMILY MEDICINE

## 2024-11-14 PROCEDURE — 1159F MED LIST DOCD IN RCRD: CPT | Performed by: FAMILY MEDICINE

## 2024-11-14 PROCEDURE — G0009 ADMIN PNEUMOCOCCAL VACCINE: HCPCS | Performed by: FAMILY MEDICINE

## 2024-11-14 PROCEDURE — 1158F ADVNC CARE PLAN TLK DOCD: CPT | Performed by: FAMILY MEDICINE

## 2024-11-14 PROCEDURE — 3078F DIAST BP <80 MM HG: CPT | Performed by: FAMILY MEDICINE

## 2024-11-14 PROCEDURE — 83036 HEMOGLOBIN GLYCOSYLATED A1C: CPT | Mod: CLIA WAIVED TEST | Performed by: FAMILY MEDICINE

## 2024-11-14 PROCEDURE — 3075F SYST BP GE 130 - 139MM HG: CPT | Performed by: FAMILY MEDICINE

## 2024-11-14 PROCEDURE — 1160F RVW MEDS BY RX/DR IN RCRD: CPT | Performed by: FAMILY MEDICINE

## 2024-11-14 PROCEDURE — 90662 IIV NO PRSV INCREASED AG IM: CPT | Performed by: FAMILY MEDICINE

## 2024-11-14 PROCEDURE — 1123F ACP DISCUSS/DSCN MKR DOCD: CPT | Performed by: FAMILY MEDICINE

## 2024-11-14 RX ORDER — GABAPENTIN 800 MG/1
800 TABLET ORAL NIGHTLY
Qty: 90 TABLET | Refills: 1 | Status: SHIPPED | OUTPATIENT
Start: 2024-11-14 | End: 2025-05-13

## 2024-11-14 RX ORDER — CHOLESTYRAMINE LIGHT 4 G/5.7G
POWDER, FOR SUSPENSION ORAL
COMMUNITY
Start: 2024-03-29

## 2024-11-14 RX ORDER — POTASSIUM CHLORIDE 750 MG/1
TABLET, EXTENDED RELEASE ORAL
COMMUNITY
Start: 2024-10-21

## 2024-11-14 ASSESSMENT — ACTIVITIES OF DAILY LIVING (ADL)
DOING_HOUSEWORK: INDEPENDENT
DRESSING: INDEPENDENT
MANAGING_FINANCES: INDEPENDENT
BATHING: INDEPENDENT
GROCERY_SHOPPING: INDEPENDENT
TAKING_MEDICATION: INDEPENDENT

## 2024-11-14 ASSESSMENT — ENCOUNTER SYMPTOMS
OCCASIONAL FEELINGS OF UNSTEADINESS: 0
DEPRESSION: 0
LOSS OF SENSATION IN FEET: 1

## 2024-11-14 ASSESSMENT — PAIN SCALES - GENERAL: PAINLEVEL_OUTOF10: 2

## 2024-11-14 NOTE — PROGRESS NOTES
"Subjective   Patient ID: Kit Franklin is a 78 y.o. male who presents for Medicare Annual Wellness Visit Subsequent (He is not fasting.) and Flu Vaccine.    HPI   Patient here for Medicare annual.  He has had a long rough year with vascular and lower extremity ulcers.  He feels he is doing better than when he has in a long time.  Admits his gait is a bit unsteady.  Eye appt-less than 1 year  Review of Systems    Objective   /72 (BP Location: Left arm, Patient Position: Sitting, BP Cuff Size: Adult)   Pulse 62   Ht 1.727 m (5' 8\")   Wt 96.8 kg (213 lb 6.4 oz)   SpO2 99%   BMI 32.45 kg/m²     Physical Exam  General: Alert, pleasant and in no acute distress  HEENT: Extraocular motor intact, PERRLA  Neck: supple, nontender, no palpable or enlarged nodes, no thyromegaly              No JVD, Carotid Bruits  Heart: Regular rate and rhythm, no murmurs  Lungs: Clear to auscultation with good air exchange  Chest- symmetric  Abdomen: Soft, nontender, no palpable mass or organomegaly  Lower extremities: No edema  Skin: No atypical rashes or lesions, no concerning lesions    Assessment/Plan       Health Maint: Care gaps addressed.  Flu and pneumonia shot provided.   Patient with a bit of gait instability due to arthritis and his issues with his lower extremities.  Encourage patient to perform therapeutic type exercises.  Consider physical therapy if not improving.  A1c shows good sugar control at 5.6.  Will also get a PSA and lipid panel.  Other labs have been completed over the last year and have been stable.  Recommend follow-up in 4 months     "

## 2024-11-19 ENCOUNTER — OFFICE VISIT (OUTPATIENT)
Dept: WOUND CARE | Facility: CLINIC | Age: 79
End: 2024-11-19
Payer: MEDICARE

## 2024-11-19 PROCEDURE — 11042 DBRDMT SUBQ TIS 1ST 20SQCM/<: CPT

## 2024-11-20 LAB
ACID FAST STN SPEC: NORMAL
MYCOBACTERIUM SPEC CULT: NORMAL

## 2024-11-26 LAB
ACID FAST STN SPEC: NORMAL
MYCOBACTERIUM SPEC CULT: NORMAL

## 2024-12-03 ENCOUNTER — OFFICE VISIT (OUTPATIENT)
Dept: WOUND CARE | Facility: CLINIC | Age: 79
End: 2024-12-03
Payer: MEDICARE

## 2024-12-03 PROCEDURE — 99213 OFFICE O/P EST LOW 20 MIN: CPT

## 2024-12-04 LAB
ACID FAST STN SPEC: NORMAL
MYCOBACTERIUM SPEC CULT: NORMAL

## 2024-12-06 ENCOUNTER — HOSPITAL ENCOUNTER (OUTPATIENT)
Dept: VASCULAR MEDICINE | Facility: CLINIC | Age: 79
Discharge: HOME | End: 2024-12-06
Payer: MEDICARE

## 2024-12-06 DIAGNOSIS — R09.89 BILATERAL CAROTID BRUITS: ICD-10-CM

## 2024-12-06 DIAGNOSIS — I73.9 PAD (PERIPHERAL ARTERY DISEASE) (CMS-HCC): ICD-10-CM

## 2024-12-06 PROCEDURE — 93880 EXTRACRANIAL BILAT STUDY: CPT

## 2024-12-06 PROCEDURE — 93880 EXTRACRANIAL BILAT STUDY: CPT | Performed by: INTERNAL MEDICINE

## 2024-12-11 LAB
ACID FAST STN SPEC: NORMAL
MYCOBACTERIUM SPEC CULT: NORMAL

## 2024-12-17 ENCOUNTER — OFFICE VISIT (OUTPATIENT)
Dept: WOUND CARE | Facility: CLINIC | Age: 79
End: 2024-12-17
Payer: MEDICARE

## 2024-12-17 PROCEDURE — 99213 OFFICE O/P EST LOW 20 MIN: CPT

## 2024-12-18 DIAGNOSIS — I10 HYPERTENSION, UNSPECIFIED TYPE: ICD-10-CM

## 2024-12-18 LAB
ACID FAST STN SPEC: NORMAL
MYCOBACTERIUM SPEC CULT: NORMAL

## 2024-12-18 RX ORDER — METOPROLOL SUCCINATE 50 MG/1
50 TABLET, EXTENDED RELEASE ORAL DAILY
Qty: 90 TABLET | Refills: 1 | Status: SHIPPED | OUTPATIENT
Start: 2024-12-18 | End: 2025-12-18

## 2025-01-07 ENCOUNTER — OFFICE VISIT (OUTPATIENT)
Dept: WOUND CARE | Facility: CLINIC | Age: 80
End: 2025-01-07
Payer: MEDICARE

## 2025-01-07 PROCEDURE — 99213 OFFICE O/P EST LOW 20 MIN: CPT

## 2025-01-08 ENCOUNTER — APPOINTMENT (OUTPATIENT)
Dept: PRIMARY CARE | Facility: CLINIC | Age: 80
End: 2025-01-08
Payer: MEDICARE

## 2025-01-08 VITALS
SYSTOLIC BLOOD PRESSURE: 144 MMHG | TEMPERATURE: 97.5 F | HEART RATE: 72 BPM | OXYGEN SATURATION: 96 % | DIASTOLIC BLOOD PRESSURE: 72 MMHG | BODY MASS INDEX: 33.19 KG/M2 | WEIGHT: 219 LBS | HEIGHT: 68 IN

## 2025-01-08 DIAGNOSIS — I10 BENIGN ESSENTIAL HYPERTENSION: Primary | ICD-10-CM

## 2025-01-08 DIAGNOSIS — I70.234 ATHEROSCLEROSIS OF NATIVE ARTERIES OF RIGHT LEG WITH ULCERATION OF HEEL AND MIDFOOT (MULTI): ICD-10-CM

## 2025-01-08 DIAGNOSIS — I73.9 PAD (PERIPHERAL ARTERY DISEASE) (CMS-HCC): ICD-10-CM

## 2025-01-08 PROBLEM — L89.892 PRESSURE ULCER OF OTHER SITE, STAGE 2 (MULTI): Status: RESOLVED | Noted: 2024-11-14 | Resolved: 2025-01-08

## 2025-01-08 PROBLEM — L89.613 PRESSURE ULCER OF RIGHT HEEL, STAGE 3 (MULTI): Status: RESOLVED | Noted: 2024-11-14 | Resolved: 2025-01-08

## 2025-01-08 PROCEDURE — 99214 OFFICE O/P EST MOD 30 MIN: CPT | Performed by: FAMILY MEDICINE

## 2025-01-08 PROCEDURE — 1036F TOBACCO NON-USER: CPT | Performed by: FAMILY MEDICINE

## 2025-01-08 PROCEDURE — 3078F DIAST BP <80 MM HG: CPT | Performed by: FAMILY MEDICINE

## 2025-01-08 PROCEDURE — 3077F SYST BP >= 140 MM HG: CPT | Performed by: FAMILY MEDICINE

## 2025-01-08 PROCEDURE — 1160F RVW MEDS BY RX/DR IN RCRD: CPT | Performed by: FAMILY MEDICINE

## 2025-01-08 PROCEDURE — 1159F MED LIST DOCD IN RCRD: CPT | Performed by: FAMILY MEDICINE

## 2025-01-08 RX ORDER — ASCORBIC ACID 500 MG
500 TABLET ORAL DAILY
COMMUNITY

## 2025-01-08 RX ORDER — AMOXICILLIN 500 MG/1
TABLET, FILM COATED ORAL
COMMUNITY
Start: 2024-09-09

## 2025-01-08 NOTE — PROGRESS NOTES
Subjective   Patient ID: Kit Franklin is a 79 y.o. male who presents for New Patient Visit.    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        Peripheral artery disease status post right iliofemoral endarterectomy following with vascular surgery  Recent right foot surgery for nonhealing ulcer with osteomyelitis  Neuropathy - Rt lower extremity - on gabapentin      Rt hip - replacement  B/l knee TKA      Seeing vascular sx and podiatry  Will be seeing neurology    No new s/s      Labs on next visit  Allergies   Allergen Reactions    Lisinopril Cough    Meperidine Nausea/vomiting       Current Outpatient Medications   Medication Sig Dispense Refill    ascorbic acid (Vitamin C) 500 mg tablet Take 1 tablet (500 mg) by mouth once daily.      aspirin 81 mg EC tablet Take 1 tablet (81 mg) by mouth once daily. 30 tablet 3    clopidogrel (Plavix) 75 mg tablet Take 1 tablet (75 mg) by mouth once daily. Per Dr Brown to hold toda resume after procedure      furosemide (Lasix) 20 mg tablet Take 1-2 tablets (20-40 mg) by mouth once daily. (Patient taking differently: Take 1 tablet (20 mg) by mouth once daily.) 60 tablet 3    gabapentin (Neurontin) 800 mg tablet Take 1 tablet (800 mg) by mouth once daily at bedtime. 90 tablet 1    metoprolol succinate XL (Toprol-XL) 50 mg 24 hr tablet Take 1 tablet (50 mg) by mouth once daily. 90 tablet 1    multivitamin with minerals tablet Take 1 tablet by mouth once daily.      potassium chloride CR 10 mEq ER tablet TAKE TWO TABLETS BY MOUTH DAILY. do not crush or chew. (Patient taking differently: 1 tablet (10 mEq) once daily.)      simethicone (Mylicon) 80 mg chewable tablet Chew 0.5 tablets (40 mg) 4 times a day as needed for flatulence. 30  "tablet 0    simvastatin (Zocor) 20 mg tablet Take 1 tablet (20 mg) by mouth once daily. 90 tablet 2    amoxicillin (Amoxil) 500 mg tablet TAKE 4 TABLETS BY MOUTH ONE TIME FOR ONE DOSE. TAKE 1 HOUR PRIOR TO PROCEDURE (Patient not taking: Reported on 1/8/2025)       No current facility-administered medications for this visit.       Objective   Visit Vitals  /72 (BP Location: Left arm, Patient Position: Sitting)   Pulse 72   Temp 36.4 °C (97.5 °F)   Ht 1.727 m (5' 8\")   Wt 99.3 kg (219 lb)   SpO2 96%   BMI 33.30 kg/m²   Smoking Status Former   BSA 2.18 m²     Physical Exam  Constitutional:       General: He is not in acute distress.     Appearance: Normal appearance.   HENT:      Head: Normocephalic and atraumatic.      Nose: Nose normal.   Eyes:      Extraocular Movements: Extraocular movements intact.      Conjunctiva/sclera: Conjunctivae normal.   Cardiovascular:      Rate and Rhythm: Normal rate and regular rhythm.   Pulmonary:      Effort: Pulmonary effort is normal.      Breath sounds: Normal breath sounds.   Skin:     General: Skin is warm.   Neurological:      Mental Status: He is alert and oriented to person, place, and time.   Psychiatric:         Mood and Affect: Mood normal.         Behavior: Behavior normal.   Immunization History   Administered Date(s) Administered    COVID-19, subunit, rS-nanoparticle, adjuvanted, PF, 5 mcg/0.5 mL 12/04/2024    Flu vaccine, quadrivalent, high-dose, preservative free, age 65y+ (FLUZONE) 11/24/2023    Flu vaccine, trivalent, preservative free, HIGH-DOSE, age 65y+ (Fluzone) 09/18/2017, 09/23/2019, 10/07/2020, 11/14/2024    Influenza, Seasonal, Quadrivalent, Adjuvanted 10/13/2022    Influenza, Unspecified 01/07/2008, 12/06/2011, 12/21/2012, 12/17/2013    Influenza, seasonal, injectable 10/08/2015, 10/04/2016, 10/14/2021    Influenza, trivalent, adjuvanted 09/28/2018    Moderna COVID-19 vaccine, 12 years and older (50mcg/0.5mL)(Spikevax) 11/24/2023    Moderna COVID-19 " vaccine, bivalent, blue cap/gray label *Check age/dose* 10/14/2022    Moderna SARS-CoV-2 Vaccination 03/15/2021, 04/13/2021, 12/10/2021    PPD Test 06/06/2024, 06/12/2024    Pneumococcal Conjugate PCV 7 1946    Pneumococcal conjugate vaccine, 13-valent (PREVNAR 13) 09/18/2015    Pneumococcal conjugate vaccine, 20-valent (PREVNAR 20) 11/14/2024    Pneumococcal polysaccharide vaccine, 23-valent, age 2 years and older (PNEUMOVAX 23) 02/14/2012    RSV, 60 Years And Older (AREXVY) 12/03/2024    Tdap vaccine, age 7 year and older (BOOSTRIX, ADACEL) 01/07/2008, 06/22/2022    Zoster vaccine, recombinant, adult (SHINGRIX) 05/28/2021, 08/10/2021    Zoster, live 01/05/2014       Review of Systems    Office Visit on 11/14/2024   Component Date Value Ref Range Status    POCT Hemoglobin A1C 11/14/2024 5.6  4.2 - 6.5 % Final   Admission on 10/25/2024, Discharged on 10/25/2024   Component Date Value Ref Range Status    AFB Culture 10/25/2024 No Mycobacteria isolated.   Final    AFB Stain 10/25/2024 No acid fast bacilli seen   Final    Fungal Culture/Smear 10/25/2024 No fungi isolated.   Final    Fungal Smear 10/25/2024 No fungal elements seen   Final    Tissue/Wound Culture/Smear 10/25/2024 No growth aerobically and anaerobically   Final    Gram Stain 10/25/2024 (1+) Rare Polymorphonuclear leukocytes   Final    Gram Stain 10/25/2024 No organisms seen   Final   Lab Requisition on 10/08/2024   Component Date Value Ref Range Status    Tissue/Wound Culture/Smear 10/08/2024 No growth aerobically and anaerobically   Final    Gram Stain 10/08/2024 (3+) Moderate Polymorphonuclear leukocytes   Final    Gram Stain 10/08/2024 No organisms seen   Final   Lab on 10/08/2024   Component Date Value Ref Range Status    C-Reactive Protein 10/08/2024 0.56  <1.00 mg/dL Final    Sedimentation Rate 10/08/2024 11  0 - 20 mm/h Final       Radiology: Reviewed imaging in powerchart.  No results found.    Family History   Problem Relation Name Age of  Onset    Dementia Mother      Hypertension Mother      Other (Senile dementia) Mother      Dementia Father      Diabetes Father      Hypertension Father      Mitral valve prolapse Father      Heart attack Father      Other (Senile dementia) Father      Diabetes Brother       Social History     Socioeconomic History    Marital status:    Tobacco Use    Smoking status: Former     Current packs/day: 0.00     Average packs/day: 2.0 packs/day for 15.0 years (30.0 ttl pk-yrs)     Types: Cigarettes, Cigars     Quit date: 2007     Years since quittin.3     Passive exposure: Past    Smokeless tobacco: Former    Tobacco comments:     Six, five, and eight non-smoking intervals   Vaping Use    Vaping status: Never Used   Substance and Sexual Activity    Alcohol use: Yes     Alcohol/week: 10.0 standard drinks of alcohol     Types: 3 Glasses of wine, 6 Cans of beer, 1 Shots of liquor per week     Comment: 2 aday    Drug use: Never    Sexual activity: Not Currently     Partners: Female     Birth control/protection: Male Sterilization     Social Drivers of Health     Financial Resource Strain: Low Risk  (8/15/2024)    Overall Financial Resource Strain (CARDIA)     Difficulty of Paying Living Expenses: Not hard at all   Transportation Needs: No Transportation Needs (8/15/2024)    PRAPARE - Transportation     Lack of Transportation (Medical): No     Lack of Transportation (Non-Medical): No   Housing Stability: Low Risk  (8/15/2024)    Housing Stability Vital Sign     Unable to Pay for Housing in the Last Year: No     Number of Times Moved in the Last Year: 0     Homeless in the Last Year: No     Past Medical History:   Diagnosis Date    Arthritis     Heart valve disease     HL (hearing loss)     Hyperlipidemia     Hypertension     Joint pain     Nephrolithiasis     Personal history of other diseases of the circulatory system 2021    History of hypertension    Personal history of other diseases of the digestive  system 03/09/2021    History of pancreatitis    Personal history of other diseases of the musculoskeletal system and connective tissue 03/09/2021    History of back pain    Personal history of other diseases of the musculoskeletal system and connective tissue 03/09/2021    History of back pain    Personal history of other endocrine, nutritional and metabolic disease 03/09/2021    History of hypercholesterolemia    Squamous cell skin cancer      Past Surgical History:   Procedure Laterality Date    CATARACT EXTRACTION      CERVICAL DISCECTOMY      COLONOSCOPY      HIP ARTHROPLASTY      KNEE ARTHROPLASTY      OTHER SURGICAL HISTORY  2008    Cervical vertebral fusion    OTHER SURGICAL HISTORY  2019    Colectomy    OTHER SURGICAL HISTORY  03/09/2021    Colonoscopy    OTHER SURGICAL HISTORY  03/09/2021    Skin biopsy    OTHER SURGICAL HISTORY  2015 Right; 2017 Left    Total Knee Replacement    ROTATOR CUFF REPAIR      SHOULDER SURGERY Left 2013    Shoulder Surgery    TOTAL HIP ARTHROPLASTY Right 06/2024       Charting was completed using voice recognition technology and may include unintended errors.

## 2025-01-14 ENCOUNTER — OFFICE VISIT (OUTPATIENT)
Dept: WOUND CARE | Facility: CLINIC | Age: 80
End: 2025-01-14
Payer: MEDICARE

## 2025-01-14 DIAGNOSIS — I73.9 CLAUDICATION (CMS-HCC): Primary | ICD-10-CM

## 2025-01-14 PROCEDURE — 99214 OFFICE O/P EST MOD 30 MIN: CPT | Performed by: SURGERY

## 2025-01-14 PROCEDURE — 99213 OFFICE O/P EST LOW 20 MIN: CPT

## 2025-01-15 ENCOUNTER — APPOINTMENT (OUTPATIENT)
Dept: NEUROLOGY | Facility: CLINIC | Age: 80
End: 2025-01-15
Payer: MEDICARE

## 2025-01-15 VITALS
BODY MASS INDEX: 32.09 KG/M2 | WEIGHT: 216.7 LBS | DIASTOLIC BLOOD PRESSURE: 81 MMHG | HEIGHT: 69 IN | SYSTOLIC BLOOD PRESSURE: 123 MMHG | HEART RATE: 65 BPM

## 2025-01-15 DIAGNOSIS — G62.9 NEUROPATHY: ICD-10-CM

## 2025-01-15 PROCEDURE — 1159F MED LIST DOCD IN RCRD: CPT | Performed by: PSYCHIATRY & NEUROLOGY

## 2025-01-15 PROCEDURE — 99214 OFFICE O/P EST MOD 30 MIN: CPT | Performed by: PSYCHIATRY & NEUROLOGY

## 2025-01-15 PROCEDURE — 3074F SYST BP LT 130 MM HG: CPT | Performed by: PSYCHIATRY & NEUROLOGY

## 2025-01-15 PROCEDURE — 1125F AMNT PAIN NOTED PAIN PRSNT: CPT | Performed by: PSYCHIATRY & NEUROLOGY

## 2025-01-15 PROCEDURE — 3079F DIAST BP 80-89 MM HG: CPT | Performed by: PSYCHIATRY & NEUROLOGY

## 2025-01-15 PROCEDURE — 1036F TOBACCO NON-USER: CPT | Performed by: PSYCHIATRY & NEUROLOGY

## 2025-01-15 RX ORDER — CLOPIDOGREL BISULFATE 75 MG/1
75 TABLET ORAL DAILY
Qty: 90 TABLET | Refills: 3 | Status: SHIPPED | OUTPATIENT
Start: 2025-01-15

## 2025-01-15 ASSESSMENT — ENCOUNTER SYMPTOMS
WEAKNESS: 0
NUMBNESS: 1

## 2025-01-15 ASSESSMENT — PATIENT HEALTH QUESTIONNAIRE - PHQ9
SUM OF ALL RESPONSES TO PHQ9 QUESTIONS 1 AND 2: 0
2. FEELING DOWN, DEPRESSED OR HOPELESS: NOT AT ALL
1. LITTLE INTEREST OR PLEASURE IN DOING THINGS: NOT AT ALL

## 2025-01-15 ASSESSMENT — PAIN SCALES - GENERAL: PAINLEVEL_OUTOF10: 4

## 2025-01-15 NOTE — PATIENT INSTRUCTIONS
Would like you to get the EMG repeated. Please have it performed either in [place], [place] at [facility] or [facility] I would prefer you to not go to a different location as I want a neuromuscular specialist for you.  Will call you with the results.  Will  have you start PT.

## 2025-01-15 NOTE — PROGRESS NOTES
Subjective     Kit Franklin is a right handed  79 y.o. year old male who presents with neuropathy (NPV, rmd- Dr. Oneal/Lumbar plexopathy vs focal neuropathy vs vascular.).    Visit type: new patient visit    HPI     He had his right hip replaced.  He woke up with severe pain in the calf and feet.  The calf pain resolved and has not returned.  The pain in the foot moved around.  Felt like shooting electric pains.  It was most the bottom of the foot.  There was also a numbness and he had difficulty sleeping for several days.  It gradually improved however he continued to have difficulty sleeping.     The surgery was June 4th.  He continues to have numbness in the foot from the ankle down.  Its the bottom of the foot.  He still gets random electric shocks.  They are not as severe as they used to be.  The outside of the foot is more common for the pain.  He also has a sore on his heel.  The skin feels thing and sensitive.      He denies any numbness in the other foot and it feels normal.     Had an EMG performed in July.      Patient was admitted to the hospital in August for a aortobifemoral arteriogram which was converted to a open femoral endarterectomy with patch angioplasty and stent in the right common iliac artery.  The arteriogram was performed due to the right foot pain and a non-healing pressure ulcer. Patient reports improvement in his pain since that surgery and he went from not being able to ambulate since the hip surgery to being able to walk fairly quickly after this surgery.     Review of Systems   Cardiovascular:  Positive for leg swelling.   Musculoskeletal:  Positive for gait problem.   Neurological:  Positive for numbness. Negative for weakness.   All other systems reviewed and are negative.    All other systems have been reviewed and are negative for complaint.     Past Medical History:   Diagnosis Date    Arthritis     Heart valve disease     HL (hearing loss)     Hyperlipidemia     Hypertension      Joint pain     Nephrolithiasis     Personal history of other diseases of the circulatory system 2021    History of hypertension    Personal history of other diseases of the digestive system 2021    History of pancreatitis    Personal history of other diseases of the musculoskeletal system and connective tissue 2021    History of back pain    Personal history of other diseases of the musculoskeletal system and connective tissue 2021    History of back pain    Personal history of other endocrine, nutritional and metabolic disease 2021    History of hypercholesterolemia    Squamous cell skin cancer      Family History   Problem Relation Name Age of Onset    Dementia Mother      Hypertension Mother      Other (Senile dementia) Mother      Dementia Father      Diabetes Father      Hypertension Father      Mitral valve prolapse Father      Heart attack Father      Other (Senile dementia) Father      Diabetes Brother       Past Surgical History:   Procedure Laterality Date    CATARACT EXTRACTION      CERVICAL DISCECTOMY      COLONOSCOPY      HIP ARTHROPLASTY      KNEE ARTHROPLASTY      OTHER SURGICAL HISTORY      Cervical vertebral fusion    OTHER SURGICAL HISTORY  2019    Colectomy    OTHER SURGICAL HISTORY  2021    Colonoscopy    OTHER SURGICAL HISTORY  2021    Skin biopsy    OTHER SURGICAL HISTORY  2015 Right; 2017 Left    Total Knee Replacement    ROTATOR CUFF REPAIR      SHOULDER SURGERY Left 2013    Shoulder Surgery    TOTAL HIP ARTHROPLASTY Right 2024      Social History     Tobacco Use    Smoking status: Former     Current packs/day: 0.00     Average packs/day: 2.0 packs/day for 15.0 years (30.0 ttl pk-yrs)     Types: Cigarettes, Cigars     Quit date: 2007     Years since quittin.3     Passive exposure: Past    Smokeless tobacco: Former    Tobacco comments:     Six, five, and eight non-smoking intervals   Substance Use Topics    Alcohol use: Yes      Alcohol/week: 10.0 standard drinks of alcohol     Types: 3 Glasses of wine, 6 Cans of beer, 1 Shots of liquor per week     Comment: 2-3 a day          Objective     Neurological Exam    Physical Exam    On general examination, the patient is well appearing and well groomed. Heart is regular, rate and rhythm. Lungs are clear to ausculation bilaterally. There is no peripheral edema. Normal pedal pulses bilaterally. No carotid bruits.   On neurologic examination, the mental status is unremarkable to informal testing. The history is related in quite good detail with no obvious deficit of attention, memory or language. Fund of knowledge is adequate. Orientation is intact to person, place and time. Spontaneous speech is fluent with no paraphasic or aphasic errors. On cranial nerve exam, the pupils are equal, round and reactive to light. Extraocular movements are full, without nystagmus. She reports no double vision on sustained upgaze or lateral gaze. Visual fields are full to confrontation. The fundi are benign with normal sharp disc margins. There is no bulbofacial weakness or ptosis. The tongue is midline with no wasting or fasciculations. The palate elevates symmetrically. Facial sensation is intact to light touch and pinprick bilaterally. Hearing is intact to finger rub bilaterally. Shoulder shrug is 5/5 bilaterally.  Neck flexion and extension have full strength. Motor examination reveals normal tone and bulk in the upper and lower extremities bilaterally. There are no fasciculations. There is full strength in the proximal and distal muscles of the upper and lower extremities bilaterally, except there is mild decreased flexion of the toes and abduction of the toes compared to the right. Deep tendon reflexes are 2/4 and symmetric throughout the upper and lower extremities bilaterally, except the right ankle jerk is absent.  Plantar responses are flexor bilaterally. Fine finger movements and rapid alternating  movements are done well in both hands. There is no tremor. On coordination testing, finger-nose-finger testing are done well bilaterally. Sensory examination reveals decreased pinprick in the entire foot and lateral calf. However there is some involvement in the medial calf and foot compared to the right side.  Vibratory sense is absent in the right foot. Gait wide based and unsteady.    Assessment/Plan   Mr. Franklin is a 79 year old man who developed acute pain and numbness in his right foot/calf follow surgery in June. He was finally found to have occlusion of the right common femoral artery in August and underwent a angioplasty with placement of right common iliac stent.  He overall has had improvement in his symptoms although has persistent pain and numbness. Symptoms likely due to ischemia of sciatic and saphenous nerves.  EMG performed in June suggestive of underlying neuropathy, however there was definite asymmetry from side to side.  Exam shows asymmetry in sensory deficit and absent right ankle jerk.  Recommend repeating EMG study.  Will call with results.    Will order PT for gait imbalance.    Imelda Pierre, DO

## 2025-01-21 ENCOUNTER — OFFICE VISIT (OUTPATIENT)
Dept: WOUND CARE | Facility: CLINIC | Age: 80
End: 2025-01-21
Payer: MEDICARE

## 2025-01-21 PROCEDURE — 99214 OFFICE O/P EST MOD 30 MIN: CPT | Performed by: SURGERY

## 2025-01-21 PROCEDURE — 99212 OFFICE O/P EST SF 10 MIN: CPT

## 2025-01-29 ENCOUNTER — CLINICAL SUPPORT (OUTPATIENT)
Dept: PHYSICAL THERAPY | Facility: CLINIC | Age: 80
End: 2025-01-29
Payer: MEDICARE

## 2025-01-29 DIAGNOSIS — G62.9 NEUROPATHY: ICD-10-CM

## 2025-01-29 PROCEDURE — 97161 PT EVAL LOW COMPLEX 20 MIN: CPT | Mod: GP | Performed by: INTERNAL MEDICINE

## 2025-01-29 PROCEDURE — 97112 NEUROMUSCULAR REEDUCATION: CPT | Mod: GP | Performed by: INTERNAL MEDICINE

## 2025-01-29 NOTE — PROGRESS NOTES
PHYSICAL THERAPY EVALUATION AND TREATMENT    Patient Name: Kit Frnaklin  MRN: [id number]  Today's Date: 1/29/2025  Time Calculation  Start Time: 0900  Stop Time: 0947  Time Calculation (min): 47 min    Insurance:  Visit number: 1 (updated 01/29/25)  Insurance Type: Payor: HUMANA MEDICARE / Plan: HUMANA GOLD CHOICE / Product Type: *No Product type* /   VISITS ARE MED NEC NEEDS AUTH COHERE PAYS AT 80% AFTER .00 0 APPLIED OOP 2150.00 60.00 APPLIED   Surgery: Right Calcaneus Deep Bone Biopsy - Right, Right Heel Wound Debridement With Site Prep - Right, and & Graft Application - Right, 10/25/2024.  Days since surgery: 96   Referred by: Imelda Pierre DO     PT Evaluation Time Entry  PT Evaluation (Low) Time Entry: 37  PT Therapeutic Procedures Time Entry  Neuromuscular Re-Education Time Entry: 10                     Assessment:  PT Assessment  PT Assessment Results: Decreased endurance, Impaired balance, Decreased mobility, Impaired sensation, Pain  Rehab Prognosis: Good  Evaluation/Treatment Tolerance: Patient tolerated treatment well  Barriers to Participation: Capable of completing ADLs semi/independent, Comorbidities, Rehab experience, Premorbid level of function     iKt Franklin  is a 79 y.o. 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 Kit's impairments include: balance deficits, gait deficits, pain, decreased sensation, and decreased functional mobility.   Due to these impairments, he has the following functional limitations and participation restrictions:  increased fall risk, difficulty with ambulation, difficulty with stair negotiation, difficulty performing household activities, difficulty performing recreational activities, difficulty with completing/performing some ADLs/IADLs, and increased time to complete ADLs/IADLs. Kit's clinical presentation is Stable and/or uncomplicated  characteristics with the level of complexity being low. Kit's potential for rehab is good.  Skilled physical therapy services are appropriate and beneficial in order to achieve measurable and meaningful change in the objective tests and measures. Utilization of skilled physical therapy services will aid in advancing his functional status and attaining his therapy-related goals. Kit verbalized understanding and is in agreement with all goals and plan of care.  Kit left session with all questions answered and no increase in symptoms.      Plan:  OP PT Plan  Treatment/Interventions: Cryotherapy, Dry needling, Education/ Instruction, Electrical stimulation, Gait training, Manual therapy, Neuromuscular re-education, Self care/ home management, Therapeutic activities, Therapeutic exercises  PT Plan: Skilled PT  PT Frequency: 1 time per week  Duration: 8 weeks  Rehab Potential: Good  Plan of Care Agreement: Patient    NV: assess response to HEP and progress as eliza with emphasis on static and dynamic standing balance and proprioceptive activities     Therapy Diagnosis:   Problem List Items Addressed This Visit    None  Visit Diagnoses         Codes    Neuropathy     G62.9            Subjective    Onset: 06/04/24    KARON: developed acute pain and numbness in his right foot/calf immediately following R THR on 06/04/24. Pt reports DC to rehab and he fell on 06/07/24 and remembers hitting R heel; developed a bruise that did not heal; had an off-loading shoe and heel was wrapped but bruise developed in to a wound. In the mean time, he rehabbed R hip and course with this was uncomplicated.    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  He overall has had improvement in his symptoms although he has persistent pain and numbness. Symptoms likely due to ischemia of sciatic and saphenous nerves.   "EMG performed in June 2024 suggestive of underlying neuropathy, however there was definite asymmetry from side to side.  Exam shows asymmetry in sensory deficit and absent right ankle jerk. Will be getting repeat EMG on 04/15/25.     Pt primary concern is standing balance due to pain and numbness in R foot. Pt denies any additional falls in the past 6 months. Pt reports he walks with SPC outside. Pt reports decrease in frequency of \"tazer\" type pain and has not had these sx's in the past few days. Denies pain in calf.     Pain intensity: 0/10   Pain location: bottom of foot, dorsum of great,   Pain description:  numbness, \"tazer\" type pain, random, intense   7/10 at worst; 0/10 at best    Worse with:  none  Better with:  sitting, gabapentin    Prior treatments/interventions:  PT SNF after R THR and outpatient PT for R THR    Home: No concerns  PLOF: Independent with all mobility, ADLs, and iADLs without AD  CLOF: Independent with all mobility, ADLs, and iADLs with SPC during community  Functional limitations: walking especially with incline walking, instability with transfers to stand  Pt's goal: \" less pain, better balance\"     Work: retired  Exercise: none  Hobbies: golf    Diagnostic images:   MR foot right w and wo IV contrast 10/06/24:      IMPRESSION:  Findings consistent with a small focus of osteomyelitis of the  plantar calcaneus adjacent to a skin wound.        Medical History Form: Reviewed (scanned into chart)      Precautions:  Fall Risk: Low based on hx   + skin CA; face and ear; medically managed. +HBP; medically managed. +Vascular disease; medically managed. +OA; medically managed. +adhesive allergy  Denies: pacemaker, DM, HTN, blood thinner medication use, latex allergy, epilepsy/seizures, or other known cardio/neuro/pulmonary problems   Denies unexpected weight loss/gain, night sweats, or night pain.    Previous surgeries include:  Vascular surgery 08/2024, R THR 06/2024, colon 2020, L TKA 2017, R " "TKA 2015, cervical disc surgery 2008     Objective   Outcome Measures:  Other Measures  Activities - Specific Balance Confidence Scale: 89  Lower Extremity Funtional Score (LEFS): 50/80    5xSTS: 13.33\" without BUE support  TUG Timed Score: 10.48\" without device   10MWT (comfortable pace): 0.87 m/s without device  10MWT (fast pace): 1.19 m/s without device     01/29/25 0900   FGA - Functional Gait Assessment   Gait level surface 3   Change in gait speed 2   Gait with horizontal head turns 2   Gait with vertical head turns 2   Gait and pivot turn 3   Step over obstacle 1   Gait with narrow base of support 1   Gait with eyes closed 1   Ambulating backwards 2   Steps 2   FGA Total Score 19     Mod-CTSIB: NT    Posture: WFL  Transfers: independent without BUE support  Bed Mobility: not observed  Gait: independent without device. Demos slowed tita, wide THERESA, and decreased push-off RLE vs LLE  Stairs: ascends/descends 4x6\" steps with UHR and reciprocal gait pattern observed each way    Dermatomes B LE:  Mid-Anterior Thigh (L2): WFL  Medial Knee (L3): WFL   Medial Malleolus (L4): diminished R, WFL L  Dorsal Second Toe Web Space (L5): hypersensitive R, WFL L   Lateral Malleolus (S1): diminished R, WFL L     Myotomes/Strength (MMT in sitting):  Hip Flex (L2) R/L: 5/5 , 5/5   Hip Add R/L: 5/5 , 5/5   Hip Abd R/L: 4/5 , 4/5   Hip Ext (observed STS): at least 3+/5 B  Knee Ext (L3) R/L: 5/5 , 5/5  Knee Flex R/L:  5/5 , 5/5   Ankle DF (L4) R/L: 4+/5 , 4+/5   Ankle PF (S1) R/L: 5/5 , 5/5   Great Toe Ext (L5) R/L: 4+/5 , 4+/5    Abdominals (sitting unsupported): WFL  Back Extensors (sitting unsupported): WFL     Reflexes:   -Patella (R/L) (L4): 2+ / unable to elicit  -Achilles (R/L) (S1): unable to elicit / unable to elicit      Treatments:  Access Code: [email or web address]     Neuromuscular Re-Education:  - Tandem Walking with Counter Support  - 1 x daily - 7 x weekly - 4-8 sets  - Backward Walking with Counter Support  - 1 x daily - 7 x " weekly - 4-8 sets  - Standing Balance in Corner with Eyes Closed  - 1 x daily - 7 x weekly - 5-8 sets - 30-60 second hold      EDUCATION:  Outpatient Education  Individual(s) Educated: Patient  Education Provided: Home Exercise Program, POC  Patient/Caregiver Demonstrated Understanding: yes  Plan of Care Discussed and Agreed Upon: yes  Patient Response to Education: Patient/Caregiver Verbalized Understanding of Information, Patient/Caregiver Performed Return Demonstration of Exercises/Activities, Patient/Caregiver Asked Appropriate Questions    -Educated Kit regarding benefit and purpose of skilled PT services along with results of examination findings and how this correlates to their chief complaint.   -Answered Kit's questions in full.      Goals:  ST weeks:  1. Kit Franklin will demonstrate independence and report compliance with HEP to facilitate independent rehab program upon discharge.    LTGs: 8 weeks  1. Kit Franklin will complete all 4 conditions of ModCTSIB for 30 secs with min to no sway to increase safety, decrease fall risk, and improve ability to complete functional activities.    2. Kit Franklin  will increase FGA to >/= 23/30 to decrease fall risk and improve safety/IND at home. The Functional Gait Assessment(FGA) is 10-item test that assesses dynamic balance and postural stability during gait.  It is used to assessed the following diagnoses: Stroke, Parkinson's Disease, SCI's, Vestibular Disorders, and Geriatrics.  A score of < 22/30 indicates Increased fall risk.    3. Kit Franklin  will increase the 10MWT score by >/=0.16m/s (MCID) and > 0.8 m/s which is predictive of community ambulation post stroke. Gait speed on the 10MWT in regards to ambulation classification: </= 0.4 m/s household ambulator; 0.4 m/s-0.8 m/s limited community ambulation; > 0.8 m/s community ambulator)    4. Kit Franklin will ambulate with IND without device on his driveway (dry conditions) on without imbalance or  major deviation to safely return to PLOF and enhance access to the home and car for transportation.    5. Kit Rigoberto will be able to ambulate IND without device on unrestricted distances on even and uneven surfaces to mimic grass surfaces of golf course per PLOF and resume recreational activities for improved quality of life.

## 2025-02-06 NOTE — PROGRESS NOTES
PHYSICAL THERAPY TREATMENT    Patient Name: Kit Franklin  MRN: [id number]  Today's Date: 2/7/2025  Time Calculation  Start Time: 0848  Stop Time: 0930  Time Calculation (min): 42 min    Insurance:  Visit number: 2 of 8 (updated 02/07/25)   Insurance Type: Payor: HUMANA MEDICARE / Plan: HUMANA GOLD CHOICE / Product Type: *No Product type* /   VISITS ARE MED NEC NEEDS AUTH LISSA PAYS AT 80% AFTER .00 0 APPLIED OOP 2150.00 60.00 APPLIED   AUTH 8 V 1-27-4/12/25   Surgery: Right Calcaneus Deep Bone Biopsy - Right, Right Heel Wound Debridement With Site Prep - Right, and & Graft Application - Right, 10/25/2024.  Days since surgery: 105   Referred by: Imelda Pierre DO        PT Therapeutic Procedures Time Entry  Neuromuscular Re-Education Time Entry: 32  Therapeutic Activity Time Entry: 10                     Current Problem  Problem List Items Addressed This Visit    None  Visit Diagnoses         Codes    Neuropathy     G62.9               Precautions:  Fall Risk: Low based on hx   + skin CA; face and ear; medically managed. +HBP; medically managed. +Vascular disease; medically managed. +OA; medically managed. +adhesive allergy  Denies: pacemaker, DM, HTN, blood thinner medication use, latex allergy, epilepsy/seizures, or other known cardio/neuro/pulmonary problems   Denies unexpected weight loss/gain, night sweats, or night pain.    Previous surgeries include:  Vascular surgery 08/2024, R THR 06/2024, colon 2020, L TKA 2017, R TKA 2015, cervical disc surgery 2008     General  Subjective      Kit Franklin denies any falls or complications since last session. Kit Franklin reports he always feels numbness in his R foot. Reports it took him 45 minutes to walk 6 tenth of a mile yesterday. Reports his R ankle feels swollen and sore. Sometimes feels ankle rolling when walking.   His wound has not reopened - he has been discharged from the wound clinic.   Would like to get back to golfing again.  He has to walk with  a cane, soreness in the ankle and foot INC with longer walking distances    Kit Franklin reports good compliance with HEP.    Pain:  0/10  Pain location: NA      Objective     Treatments:  Abbreviation Key  NP = not performed this visit   NV = next visit  // = parallel    Assigned HEP- Medbridge Access Code: [email or web address]       Therapeutic Activity: 10 minutes  Educated pt regarding rationale for interventions, proper response to de-sensitization, importance of performing skin check with mirror, components of balance system.     Neuromuscular Re-education: 32 minutes   Ankle MMT:  INV R/L: 4-/4  EVR R/L: 4-/4  Ankle INV and EVR in long sitting with heel of foot OFF EOB to prevent rubbing on wound x 12 reps each direction R foot RTB (issued GTB for pt to progress to at home as able) - HEP   Toe scrunches x 12 reps R LE - HEP   NBOS EC even surface x 30 seconds   Modified 1/4 tandem stance EC even surface x 30 seconds each LE leading - HEP   De-sensitization of R foot using towel - HEP   Verbal review:   - Tandem Walking with Counter Support  - 1 x daily - 7 x weekly - 4-8 sets  - Backward Walking with Counter Support  - 1 x daily - 7 x weekly - 4-8 sets          Outpatient Education: Home exercise program instructed and issued.   Kit Franklin verbalizes understanding of all education provided: Yes    Assessment:  Kit Franklin completed treatment today which focused upon balance interventions, ankle strengthening, education, and de-sensitization in order to address R foot/ankle pain and balance deficits.  Kit presents with weakness of ankle INV and EVR along with mild hypersensitivity to towel touching foot - HEP updated to address.  Kit requires min cues for proper therex technique initially.  Kit was challenged mildly with static balance interventions but able to progress safely for HEP.  Kit's  response to tx was: Patient tolerated therapeutic interventions well and without any adverse events. Kit's Progress  towards goals: Patient reports there has not been a significant change in functional abilities. Kit Franklin continues to have gait deficits, balance deficits, decreased strength, and pain limiting participation in household chores, exercise, attending medical appointments, and shopping within the community Further skilled therapy services are warranted to address the remaining impairments in order to progress towards functional goals. Kit left session with all questions answered and no increase in symptoms.      Plan: mod CTSIB assessment nv  Assess response to Hep updates, progress de-sensitization as able; Gait and dynamic balance as able      Time Calculation  Start Time: 0848  Stop Time: 0930  Time Calculation (min): 42 min     PT Therapeutic Procedures Time Entry  Neuromuscular Re-Education Time Entry: 32  Therapeutic Activity Time Entry: 10

## 2025-02-07 ENCOUNTER — TREATMENT (OUTPATIENT)
Dept: PHYSICAL THERAPY | Facility: CLINIC | Age: 80
End: 2025-02-07
Payer: MEDICARE

## 2025-02-07 DIAGNOSIS — G62.9 NEUROPATHY: ICD-10-CM

## 2025-02-07 PROCEDURE — 97530 THERAPEUTIC ACTIVITIES: CPT | Mod: GP

## 2025-02-07 PROCEDURE — 97112 NEUROMUSCULAR REEDUCATION: CPT | Mod: GP

## 2025-02-10 ENCOUNTER — TREATMENT (OUTPATIENT)
Dept: PHYSICAL THERAPY | Facility: CLINIC | Age: 80
End: 2025-02-10
Payer: MEDICARE

## 2025-02-10 DIAGNOSIS — G62.9 NEUROPATHY: ICD-10-CM

## 2025-02-10 PROCEDURE — 97112 NEUROMUSCULAR REEDUCATION: CPT | Mod: GP | Performed by: GENERAL ACUTE CARE HOSPITAL

## 2025-02-10 NOTE — PROGRESS NOTES
Physical Therapy    Physical Therapy Treatment    Patient Name: Kit Franklin  MRN: [id number]  Today's Date: 2/10/2025  Time Calculation  Start Time: 1100  Stop Time: 1142  Time Calculation (min): 42 min  Insurance - reviewed   Visit number: 3 (updated 02/10/25)   Payor: HUMANA MEDICARE / Plan: HUMANA GOLD CHOICE / Product Type: *No Product type* /    VISITS ARE MED NEC NEEDS AUTH [employer] PAYS AT 80% AFTER .00 0 APPLIED OOP 2150.00 60.00 APPLIED   **AUTH 8 V 1-27-4/12/25**  Referring Provider: Imelda Pierre DO       Current Problem  Problem List Items Addressed This Visit    None  Visit Diagnoses         Codes    Neuropathy     G62.9          Precautions:  Fall Risk: Low based on hx   + skin CA; face and ear; medically managed. +HBP; medically managed. +Vascular disease; medically managed. +OA; medically managed. +adhesive allergy  Denies: pacemaker, DM, HTN, blood thinner medication use, latex allergy, epilepsy/seizures, or other known cardio/neuro/pulmonary problems   Denies unexpected weight loss/gain, night sweats, or night pain.     Previous surgeries include:  Vascular surgery 08/2024, R THR 06/2024, colon 2020, L TKA 2017, R TKA 2015, cervical disc surgery 2008     General  Subjective      Kit Franklin denies any falls or complications since last session. Kit Franklin reports that he notes slow progress.      Kit Franklin reports compliance with HEP.    Pain:  3/10  Pain location: right sided neck      Objective   Modified Clinical Test of Sensory Interaction in Balance:      Eyes Open on land = 30 seconds  Eyes Closed on land = 30 seconds -Failure to maintain balance with eye closed on firm surface indicates visually dependent.   Eyes Open on airex = 30 seconds - Failure to maintain balance on foam surface indicate that visual and/or vestibular system is not be being used to maintain balance.    Eyes Closed on airex = 30 seconds - Inability to maintain standing on foam with eyes closed predicted  "future multiple falls  120/120 = scores < 120 indicate increased risk for falls     Treatments:  Abbreviation Key  NP = not performed this visit   NV = next visit  // = parallel    Assigned HEP- Medbridge Access Code: [email or web address]     Neuromuscular Re-education: 42 minutes   With gait belt donned and at least close SBA  NBOS on land with EO  NBOS on land with EC  NBOS on airex with EO   NBOS on airex with EC  Partial tandem progressed to tandem stance - each leg  Orally reviewed how to progress to remain challenged at home  Airex step ups x 10 each side no UE  Step over overturned 4\" step  Step over single david   FWD/BKWD  Lateral  Step over hurdles in series      Outpatient Education: Reviewed and updated home exercise program. Answered questions about home exercise program. Provided manual cues for correct performance of exercises. Provided verbal feedback to improve exercise technique. Reviewed balance system and components, rationale for ex.  Kit Franklin verbalizes understanding of all education provided: Yes    Assessment:  Kit Franklin completed treatment today which focused upon static and balance ex in order to address balance deficits.  Kit presents with decreased swing at times and was able to self correct vs use of // bar to stabilize.  Kit requires close SBA vs light min A with some balance progressions.  Kit was challenged with  stepping ex and would catch R toes during swing at times using B UE on // bars to stabilize.  Kit's  response to tx was: Patient tolerated therapeutic interventions well and without any adverse events. Kit's Progress towards goals: Patient reports there has not been a significant change in functional abilities. Kit Franklin continues to have balance deficits limiting participation in household chores and recreational activities. Further skilled therapy services are warranted to address the remaining impairments in order to progress towards functional goals. Kit left " session with all questions answered and no increase in symptoms.      Plan:  Assess response to HEP updates, progress de-sensitization as able; Gait and dynamic balance as able      Time Calculation  Start Time: 1100  Stop Time: 1142  Time Calculation (min): 42 min     PT Therapeutic Procedures Time Entry  Neuromuscular Re-Education Time Entry: 42

## 2025-02-19 ENCOUNTER — TREATMENT (OUTPATIENT)
Dept: PHYSICAL THERAPY | Facility: CLINIC | Age: 80
End: 2025-02-19
Payer: MEDICARE

## 2025-02-19 DIAGNOSIS — G62.9 NEUROPATHY: ICD-10-CM

## 2025-02-19 PROCEDURE — 97112 NEUROMUSCULAR REEDUCATION: CPT | Mod: GP | Performed by: INTERNAL MEDICINE

## 2025-02-19 NOTE — PROGRESS NOTES
"Physical Therapy    Physical Therapy Treatment    Patient Name: Kit Franklin  MRN: [id number]  Today's Date: 2/19/2025  Time Calculation  Start Time: 0905  Stop Time: 0944  Time Calculation (min): 39 min    Insurance - reviewed   Visit number: 4 (updated 02/19/25)   Payor: HUMANA MEDICARE / Plan: HUMANA GOLD CHOICE / Product Type: *No Product type* /    VISITS ARE MED NEC NEEDS AUTH [employer] PAYS AT 80% AFTER .00 0 APPLIED OOP 2150.00 60.00 APPLIED   **AUTH 8 V 1-27-4/12/25**  Referring Provider: Imelda Pierre DO       Current Problem  Problem List Items Addressed This Visit    None  Visit Diagnoses         Codes    Neuropathy     G62.9            Precautions:  Fall Risk: Low based on hx   + skin CA; face and ear; medically managed. +HBP; medically managed. +Vascular disease; medically managed. +OA; medically managed. +adhesive allergy  Denies: pacemaker, DM, HTN, blood thinner medication use, latex allergy, epilepsy/seizures, or other known cardio/neuro/pulmonary problems   Denies unexpected weight loss/gain, night sweats, or night pain.     Previous surgeries include:  Vascular surgery 08/2024, R THR 06/2024, colon 2020, L TKA 2017, R TKA 2015, cervical disc surgery 2008     General  Subjective      Kit Franklin denies any falls or complications since last session. Kit Franklin reports that he notes slow progress. Pt plowed snow x1 hour and noticed increased ankle swelling that has improved since then.    Kit Franklin reports fair compliance with HEP    Pain:  1-2/10  Pain location: right sided neck    Treatments:  Abbreviation Key  NP = not performed this visit   NV = next visit  // = parallel    Assigned HEP- Medbridge Access Code: [email or web address]     Neuromuscular Re-education: 39 minutes   With gait belt donned and at least close SBA  NBOS on land with EC x30\"; no sway  NBOS on airex with EO x30\"; no to mild sway  NBOS on airex with EC x30\" no to mild sway  Trial NBOS on airex with EO + head turns; " "deferred due to neck pain  Partial tandem standing on foam EO x30\" each side  Partial tandem on standing on land EC x30\" L foot in front  Partial tandem on standing on load EC x20\" + 30\" R foot in front  FWD and LAT Airex step ups x 10 each side no UE, each way. CGA  FWD and LAT step over overturned 4\" step. CGA  Golf swings standing on foam pad 5x2 rounds. CGA    The following were NP this session:   Step over single david   FWD/BKWD  Lateral  Step over hurdles in series  Partial tandem progressed to tandem stance - each leg  Orally reviewed how to progress to remain challenged at home        Outpatient Education:   rationale for ex.    Kit Franklin verbalizes understanding of all education provided: Yes    Assessment:  Kit Franklin completed treatment today which focused upon static and dynamic balance activities on even and uneven ground in order to address balance deficits. Pt demos improvements in standing balance with NBOS without UE support required.      Kit presents with decreased RLE swing and stance stability at times and was able to self correct vs use of // bar to stabilize.  Kit requires close SBA vs light min A with some balance progressions.  Kit was challenged with stepping ex and would catch R toes during swing at times using B UE on // bars to stabilize. However, pt only requires CGA during golf swings/putts standing on foam pad. Kit's  response to tx was: Patient tolerated therapeutic interventions well and without any adverse events. Kit's Progress towards goals: Patient reports there has not been a significant change in functional abilities. Kit Franklin continues to have balance deficits limiting participation in household chores and recreational activities. Further skilled therapy services are warranted to address the remaining impairments in order to progress towards functional goals. Kit left session with all questions answered and no increase in symptoms.      Plan:  Assess " response to HEP updates, progress de-sensitization as able; Gait and dynamic balance as able      Time Calculation  Start Time: 0905  Stop Time: 0944  Time Calculation (min): 39 min     PT Therapeutic Procedures Time Entry  Neuromuscular Re-Education Time Entry: 39

## 2025-02-24 ENCOUNTER — TREATMENT (OUTPATIENT)
Dept: PHYSICAL THERAPY | Facility: CLINIC | Age: 80
End: 2025-02-24
Payer: MEDICARE

## 2025-02-24 DIAGNOSIS — G62.9 NEUROPATHY: ICD-10-CM

## 2025-02-24 PROCEDURE — 97112 NEUROMUSCULAR REEDUCATION: CPT | Mod: GP

## 2025-02-24 NOTE — PROGRESS NOTES
"Physical Therapy    Physical Therapy Treatment    Patient Name: Kit Franklin  MRN: [id number]  Today's Date: 2/24/2025  Time Calculation  Start Time: 0859  Stop Time: 0940  Time Calculation (min): 41 min    Insurance - reviewed   Visit number: 5 (updated 02/24/25)   Payor: HUMANA MEDICARE / Plan: HUMANA GOLD CHOICE / Product Type: *No Product type* /    VISITS ARE MED NEC NEEDS AUTH COHERE PAYS AT 80% AFTER .00 0 APPLIED OOP 2150.00 60.00 APPLIED   **AUTH 8 V 1-27-4/12/25**  Referring Provider: Imelda Pierre DO       Current Problem  Problem List Items Addressed This Visit    None  Visit Diagnoses         Codes    Neuropathy     G62.9              Precautions:  Fall Risk: Low based on hx   + skin CA; face and ear; medically managed. +HBP; medically managed. +Vascular disease; medically managed. +OA; medically managed. +adhesive allergy  Denies: pacemaker, DM, HTN, blood thinner medication use, latex allergy, epilepsy/seizures, or other known cardio/neuro/pulmonary problems   Denies unexpected weight loss/gain, night sweats, or night pain.     Previous surgeries include:  Vascular surgery 08/2024, R THR 06/2024, colon 2020, L TKA 2017, R TKA 2015, cervical disc surgery 2008     General  Subjective    Kit Franklin denies any falls or complications since last session.  Reports \"I think I am doing better. I can execute what I've been doing a little easier\".   Kit Franklin reports good compliance with HEP    Pain:  0/10  Pain location: n/a    Treatments:  Abbreviation Key  NP = not performed this visit   NV = next visit  // = parallel    Assigned HEP- Medbridge Access Code: [email or web address]     Neuromuscular Re-education: 41 minutes   With gait belt donned and CGA  STS without UE support on airex, 3x5  FWD/BWD step ups on airex, 3x10  Side steps onto airex, 3x5 each direction  Fritter board FWD/BWD weight shifts, 3x10  Fritter board side weight shifts, 3x10 each direction  SLS ball taps, 3x10  Heel <> toe raises, " "3x10- HEP  Slow marching in front of counter x10 reps- HEP    NC  NBOS on land with EC x30\"; no sway  NBOS on airex with EO x30\"; no to mild sway  NBOS on airex with EC x30\" no to mild sway  Trial NBOS on airex with EO + head turns; deferred due to neck pain  Partial tandem standing on foam EO x30\" each side  Partial tandem on standing on land EC x30\" L foot in front  Partial tandem on standing on load EC x20\" + 30\" R foot in front  Golf swings standing on foam pad 5x2 rounds. CGA  Step over single david   FWD/BKWD  Lateral  Step over hurdles in series  Partial tandem progressed to tandem stance - each leg  Orally reviewed how to progress to remain challenged at home      Outpatient Education: Cueing and rationale for ex.    Kit Franklin verbalizes understanding of all education provided: Yes    Assessment:  Kit Franklin completed treatment today which focused upon dynamic balance interventions on unstable surfaces in order to address R vascular neuropathy. Kit presents with impaired stepping strategy for balance recovery: favors reaching for external support. Utilizes wall support when available in clinic- would benefit from use of device. Demonstrates appropriate use of ankle and hip balance recovery strategies. Difficulty with SLS (R worse than L side) and weight shifts- updated HEP to include. Kit requires CGA with some balance progressions.  Kit's  response to tx was: Patient tolerated therapeutic interventions well and without any adverse events. Improved independence with home exercises. Kit's Progress towards goals: Improved balance. Kit Franklin continues to have balance deficits limiting participation in household chores and recreational activities. Further skilled therapy services are warranted to address the remaining impairments in order to progress towards functional goals. Kit left session with all questions answered and no increase in symptoms.      Plan:  Assess response to HEP updates, " progress de-sensitization as able; Gait and dynamic balance as able      Time Calculation  Start Time: 0859  Stop Time: 0940  Time Calculation (min): 41 min     PT Therapeutic Procedures Time Entry  Neuromuscular Re-Education Time Entry: 41

## 2025-03-04 ENCOUNTER — TREATMENT (OUTPATIENT)
Dept: PHYSICAL THERAPY | Facility: CLINIC | Age: 80
End: 2025-03-04
Payer: MEDICARE

## 2025-03-04 DIAGNOSIS — G62.9 NEUROPATHY: ICD-10-CM

## 2025-03-04 PROCEDURE — 97112 NEUROMUSCULAR REEDUCATION: CPT | Mod: GP | Performed by: INTERNAL MEDICINE

## 2025-03-04 NOTE — PROGRESS NOTES
Physical Therapy    Physical Therapy Treatment    Patient Name: Kit Franklin  MRN: [id number]  Today's Date: 3/4/2025  Time Calculation  Start Time: 0827  Stop Time: 0906  Time Calculation (min): 39 min    Insurance - reviewed   Visit number: 6 (updated 03/04/25)   Payor: HUMANA MEDICARE / Plan: HUMANA GOLD CHOICE / Product Type: *No Product type* /    VISITS ARE MED NEC NEEDS AUTH [employer] PAYS AT 80% AFTER .00 0 APPLIED OOP 2150.00 60.00 APPLIED   **AUTH 8 V 1-27-4/12/25**  Referring Provider: Imelda Pierre DO       Current Problem  Problem List Items Addressed This Visit    None  Visit Diagnoses         Codes    Neuropathy     G62.9                Precautions:  Fall Risk: Low based on hx   + skin CA; face and ear; medically managed. +HBP; medically managed. +Vascular disease; medically managed. +OA; medically managed. +adhesive allergy  Denies: pacemaker, DM, HTN, blood thinner medication use, latex allergy, epilepsy/seizures, or other known cardio/neuro/pulmonary problems   Denies unexpected weight loss/gain, night sweats, or night pain.     Previous surgeries include:  Vascular surgery 08/2024, R THR 06/2024, colon 2020, L TKA 2017, R TKA 2015, cervical disc surgery 2008     General  Subjective    Kit Franklin denies any falls or complications since last session. Pt reports intense R toe pain as well as R-sided neck pain today; took Alleve prior to session. No other concerns at this time.    Kit Franklin reports good compliance with HEP    Pain:  4-5/10. 4/10  Pain location: R big toe. R side of neck    Treatments:  Abbreviation Key  NP = not performed this visit   NV = next visit  // = parallel    Assigned HEP- Medbridge Access Code: [email or web address]     Neuromuscular Re-education: 39 minutes   With gait belt donned and CGA  STS without UE support on airex, 3x5  FWD step ups on airex 10x each side  LAT step ups on airex 10x each side  BWD step ups on airex 10x  Fitter/rocker board FWD/BWD weight shifts,  "30x  Fitter/rocker board side weight shifts, 30x  Slow marching in front of counter with two-finger support 10x each side   SLS taps to discs 15x eeach side two-finger support>no UE support    The following were NC this session:   SLS ball taps, 3x10  Heel <> toe raises, 3x10- HEP  NBOS on land with EC x30\"; no sway  NBOS on airex with EO x30\"; no to mild sway  NBOS on airex with EC x30\" no to mild sway  Trial NBOS on airex with EO + head turns; deferred due to neck pain  Partial tandem standing on foam EO x30\" each side  Partial tandem on standing on land EC x30\" L foot in front  Partial tandem on standing on load EC x20\" + 30\" R foot in front  Golf swings standing on foam pad 5x2 rounds. CGA  Step over single david   FWD/BKWD  Lateral  Step over hurdles in series  Partial tandem progressed to tandem stance - each leg  Orally reviewed how to progress to remain challenged at home      Outpatient Education: Cueing and rationale for ex.    Kit Rigoberto verbalizes understanding of all education provided: Yes    Assessment:  Kit Franklin completed treatment today which focused upon dynamic balance interventions on unstable surfaces in order to address R vascular neuropathy. Kit presents with no increase in falls but increased pain today; does not limit pt participation throughout session. Did not utilize wall support walking in our out of clinic and reports no falls since starting PT- will cont to assess need for device. Cont to demo appropriate use of ankle and hip balance recovery strategies during balance interventions and improves with repetition. Also cont to have difficulty with SLS (R worse than L side) and weight shifts. Kit requires CGA to Kelvin with some balance progressions.  Kit's  response to tx was: Patient tolerated therapeutic interventions well and without any adverse events. Improved independence with home exercises. Kit's Progress towards goals: Improved balance. Kit Franklin continues to have " balance deficits limiting participation in household chores and recreational activities. Further skilled therapy services are warranted to address the remaining impairments in order to progress towards functional goals. Kit left session with all questions answered and no increase in symptoms.      Plan:  progress de-sensitization as able; Gait and dynamic balance as able with emphasis on uneven surfaces, eyes closed variations, single leg stance, and stepping response strategies       Time Calculation  Start Time: 0827  Stop Time: 0906  Time Calculation (min): 39 min     PT Therapeutic Procedures Time Entry  Neuromuscular Re-Education Time Entry: 39

## 2025-03-11 ENCOUNTER — TREATMENT (OUTPATIENT)
Dept: PHYSICAL THERAPY | Facility: CLINIC | Age: 80
End: 2025-03-11
Payer: MEDICARE

## 2025-03-11 DIAGNOSIS — G62.9 NEUROPATHY: ICD-10-CM

## 2025-03-11 PROCEDURE — 97110 THERAPEUTIC EXERCISES: CPT | Mod: GP | Performed by: INTERNAL MEDICINE

## 2025-03-11 PROCEDURE — 97530 THERAPEUTIC ACTIVITIES: CPT | Mod: GP | Performed by: INTERNAL MEDICINE

## 2025-03-11 NOTE — PROGRESS NOTES
Physical Therapy    Physical Therapy Treatment    Patient Name: Kit Franklin  MRN: [id number]  Today's Date: 3/11/2025  Time Calculation  Start Time: 1050  Stop Time: 1128  Time Calculation (min): 38 min    Insurance - reviewed   Visit number: 7 (updated 03/11/25)   Payor: HUMANA MEDICARE / Plan: HUMANA GOLD CHOICE / Product Type: *No Product type* /    VISITS ARE MED NEC NEEDS AUTH [employer] PAYS AT 80% AFTER .00 0 APPLIED OOP 2150.00 60.00 APPLIED   **AUTH 8 V 1-27-4/12/25**  Referring Provider: Imelda Pierre DO       Current Problem  Problem List Items Addressed This Visit    None  Visit Diagnoses         Codes    Neuropathy     G62.9                Precautions:  Fall Risk: Low based on hx   + skin CA; face and ear; medically managed. +HBP; medically managed. +Vascular disease; medically managed. +OA; medically managed. +adhesive allergy  Denies: pacemaker, DM, HTN, blood thinner medication use, latex allergy, epilepsy/seizures, or other known cardio/neuro/pulmonary problems   Denies unexpected weight loss/gain, night sweats, or night pain.     Previous surgeries include:  Vascular surgery 08/2024, R THR 06/2024, colon 2020, L TKA 2017, R TKA 2015, cervical disc surgery 2008     General  Subjective    Kit Franklin denies any falls or complications since last session. Pt reports intense R toe pain as well as R-sided neck pain today. Pt endorses dull, aching R ankle pain and swelling; has had since June 2024 from [facility].     Kit Franklin reports good compliance with HEP    Pain:  4/10. 3/10  Pain location: R 5th toe. R side of neck    Objective:  Hip strength:  Flexion (R/L): 4/5, 4+/5  ER (R/L): 3+/5 / NT  IR (R/L): 3+/5 / NT     Hip ROM:  Flexion (R/L): 110 / 115   ER (R/L): 45 / 35  IR (R/L): 30 / 35  Abduction (R/L): 30 / 30  Extension: (R/L): 0 / 20        Treatments:  Abbreviation Key  NP = not performed this visit   NV = next visit  // = parallel    Assigned HEP- Medbridge Access Code: [email or web address]  "    Therapeutic Activity:  Assessed R hip today. Please see objective and assessment sections for more details    Therapeutic Exercise:  Clamshell  - 1 x daily - 7 x weekly - 3 sets - 5-8 reps  Seated Hip Abduction with Resistance  - 1 x daily - 7 x weekly - 3 sets - 10 reps  Seated Hip Internal Rotation with Ball and Resistance  - 1 x daily - 7 x weekly - 2 sets - 10 reps  Prone Hip Extension on Table  - 1 x daily - 7 x weekly - 3 sets - 10 reps     Neuromuscular Re-education: NC  With gait belt donned and CGA  STS without UE support on airex, 3x5  FWD step ups on airex 10x each side  LAT step ups on airex 10x each side  BWD step ups on airex 10x  Fitter/rocker board FWD/BWD weight shifts, 30x  Fitter/rocker board side weight shifts, 30x  Slow marching in front of counter with two-finger support 10x each side   SLS taps to discs 15x eeach side two-finger support>no UE support    The following were NC this session:   SLS ball taps, 3x10  Heel <> toe raises, 3x10- HEP  NBOS on land with EC x30\"; no sway  NBOS on airex with EO x30\"; no to mild sway  NBOS on airex with EC x30\" no to mild sway  Trial NBOS on airex with EO + head turns; deferred due to neck pain  Partial tandem standing on foam EO x30\" each side  Partial tandem on standing on land EC x30\" L foot in front  Partial tandem on standing on load EC x20\" + 30\" R foot in front  Golf swings standing on foam pad 5x2 rounds. CGA  Step over single david   FWD/BKWD  Lateral  Step over hurdles in series  Partial tandem progressed to tandem stance - each leg  Orally reviewed how to progress to remain challenged at home      Outpatient Education: Cueing and rationale for ex.    Kit Rigoberto verbalizes understanding of all education provided: Yes    Assessment:  Kit Rigoberto completed treatment today which focused upon assessment and treatment of R hip in order to address deficits from R CUCA. Kit presents with diminished R hip strength and ROM that can be " exacerbating R foot pain and influencing ankle swelling. Pt most limited with R hip abduction and ER strength; updated HEP to address deficits. Pt does not verbalize increased pain at end of session. Kit's  response to tx was: Patient tolerated therapeutic interventions well and without any adverse events. Improved independence with home exercises. Kit's Progress towards goals: improved HEP knowledge. Kit Franklin continues to have balance deficits limiting participation in household chores and recreational activities. Further skilled therapy services are warranted to address the remaining impairments in order to progress towards functional goals. Kit left session with all questions answered and no increase in symptoms.      Plan:  Monitor response to HEP update to address R hip strength and ROM deficits.  Gait and dynamic balance as able with emphasis on uneven surfaces, eyes closed variations, single leg stance, and stepping response strategies       Time Calculation  Start Time: 1050  Stop Time: 1128  Time Calculation (min): 38 min     PT Therapeutic Procedures Time Entry  Therapeutic Exercise Time Entry: 17  Therapeutic Activity Time Entry: 21

## 2025-03-13 DIAGNOSIS — E78.5 DYSLIPIDEMIA: ICD-10-CM

## 2025-03-13 RX ORDER — SIMVASTATIN 20 MG/1
20 TABLET, FILM COATED ORAL DAILY
Qty: 90 TABLET | Refills: 2 | Status: SHIPPED | OUTPATIENT
Start: 2025-03-13

## 2025-03-13 NOTE — PROGRESS NOTES
Physical Therapy    Physical Therapy Treatment + Re-assessment Note    Patient Name: Kit Franklin  MRN: [id number]  Today's Date: 3/17/2025  Time Calculation  Start Time: 0906  Stop Time: 0948  Time Calculation (min): 42 min    Insurance - reviewed   Visit number: 8 (updated 03/17/25)   Payor: [employer] MEDICARE / Plan: HUMANA GOLD CHOICE / Product Type: *No Product type* /    VISITS ARE MED NEC NEEDS AUTH [employer] PAYS AT 80% AFTER .00 0 APPLIED OOP 2150.00 60.00 APPLIED   **AUTH 8 V 1-27-4/12/25**  Referring Provider: Imelda Pierre DO       Current Problem  Problem List Items Addressed This Visit    None  Visit Diagnoses         Codes    Neuropathy     G62.9    Relevant Orders    Follow Up In Physical Therapy            Precautions:  Fall Risk: Low based on hx   + skin CA; face and ear; medically managed. +HBP; medically managed. +Vascular disease; medically managed. +OA; medically managed. +adhesive allergy  Denies: pacemaker, DM, HTN, blood thinner medication use, latex allergy, epilepsy/seizures, or other known cardio/neuro/pulmonary problems   Denies unexpected weight loss/gain, night sweats, or night pain.     Previous surgeries include:  Vascular surgery 08/2024, R THR 06/2024, colon 2020, L TKA 2017, R TKA 2015, cervical disc surgery 2008     General  Subjective    Kit Franklin denies any falls or complications since last session. Pt reports difficulty with new hip strengthening exercise. Pt states he went to [facility] center and was able to complete # during either hip abduction and adduction. Pt feels like his balance and mobility has gotten better since starting PT. However, pt not optimistic about further improvement; pt endorses tingling at top of R foot and in to arch. Pt has been able to hit golf balls and walk around golf course and driving range a little bit better. Pt states he just wants both of his legs to work the same.    Kit Franklin reports good compliance with HEP.     Pain:  "5/10  Pain location: R side of neck    Objective:   Outcome Measures:  Other Measures  Activities - Specific Balance Confidence Scale: 89 --> 74  Lower Extremity Funtional Score (LEFS): 50/80 --> 39/80     5xSTS: 13.33\" without BUE support --> 13.30\" without B UE support.  TUG Timed Score: 10.48\" without device --> 8.90\" without device    10MWT (comfortable pace): 0.87 m/s without device --> .91 m/s without device  10MWT (fast pace): 1.19 m/s without device --> 1.30 m/s without device       01/29/25 0900   FGA - Functional Gait Assessment   Gait level surface 3   Change in gait speed 2   Gait with horizontal head turns 2   Gait with vertical head turns 2   Gait and pivot turn 3   Step over obstacle 1   Gait with narrow base of support 1   Gait with eyes closed 1   Ambulating backwards 2   Steps 2   FGA Total Score 19    -->    03/17/25 0900   FGA - Functional Gait Assessment   Gait level surface 3   Change in gait speed 3   Gait with horizontal head turns 3   Gait with vertical head turns 2   Gait and pivot turn 3   Step over obstacle 2   Gait with narrow base of support 0   Gait with eyes closed 2   Ambulating backwards 2   Steps 2   FGA Total Score 22       Mod-CTSIB: NT --> NT     Posture: WFL --> same  Transfers: independent without BUE support --> same  Bed Mobility: not observed --> same  Gait: independent without device. Demos slowed tita, wide THERESA, and decreased push-off RLE vs LLE --> same  Stairs: ascends/descends 4x6\" steps with UHR and reciprocal gait pattern observed each way --> same     Dermatomes B LE:  Mid-Anterior Thigh (L2): WFL --> same  Medial Knee (L3): WFL --> diminished R. WFL L   Medial Malleolus (L4): diminished R, WFL L --> absent R. WFL L   Dorsal Second Toe Web Space (L5): hypersensitive R, WFL L --> WFL B   Lateral Malleolus (S1): diminished R, WFL L --> same     Hip strength:  Flexion (R/L): 4/5, 4+/5  ER (R/L): 3+/5 / NT  IR (R/L): 3+/5 / NT     Hip ROM:  Flexion (R/L): 110 / 115 "   ER (R/L): 45 / 35  IR (R/L): 30 / 35  Abduction (R/L): 30 / 30  Extension: (R/L): 0 / 20     Myotomes/Strength (MMT in sitting):  Hip Flex (L2) R/L: 5/5 , 5/5 --> same  Hip Add R/L: 5/5 , 5/5 --> same  Hip Abd R/L: 4/5 , 4/5 --> 4+/5, 4+/5   Hip Ext (observed STS): at least 3+/5 B --> same  Knee Ext (L3) R/L: 5/5 , 5/5 --> same  Knee Flex R/L:  5/5 , 5/5 --> same   Ankle DF (L4) R/L: 4+/5 , 4+/5 --> same   Ankle PF (S1) R/L: 5/5 , 5/5 --> same  Great Toe Ext (L5) R/L: 4+/5 , 4+/5 --> same     Abdominals (sitting unsupported): WFL --> same  Back Extensors (sitting unsupported): WFL  --> same     Reflexes:   -Patella (R/L) (L4): 2+ / unable to elicit --> NT  -Achilles (R/L) (S1): unable to elicit / unable to elicit --> NT            Treatments:  Abbreviation Key  NP = not performed this visit   NV = next visit  // = parallel    Assigned [facility] Access Code: [email or web address]     Therapeutic Activity:  Completed RA this session. Please see objective and goals sections for more details.    The following were NP this session:   Assessed R hip today. Please see objective and assessment sections for more details    Therapeutic Exercise: NP  Clamshell  - 1 x daily - 7 x weekly - 3 sets - 5-8 reps  Seated Hip Abduction with Resistance  - 1 x daily - 7 x weekly - 3 sets - 10 reps  Seated Hip Internal Rotation with Ball and Resistance  - 1 x daily - 7 x weekly - 2 sets - 10 reps  Prone Hip Extension on Table  - 1 x daily - 7 x weekly - 3 sets - 10 reps     Neuromuscular Re-education: NP  With gait belt donned and CGA  STS without UE support on airex, 3x5  FWD step ups on airex 10x each side  LAT step ups on airex 10x each side  BWD step ups on airex 10x  Fitter/rocker board FWD/BWD weight shifts, 30x  Fitter/rocker board side weight shifts, 30x  Slow marching in front of counter with two-finger support 10x each side   SLS taps to discs 15x eeach side two-finger support>no UE support  SLS ball taps, 3x10  Heel <> toe raises,  "3x10- HEP  NBOS on land with EC x30\"; no sway  NBOS on airex with EO x30\"; no to mild sway  NBOS on airex with EC x30\" no to mild sway  Trial NBOS on airex with EO + head turns; deferred due to neck pain  Partial tandem standing on foam EO x30\" each side  Partial tandem on standing on land EC x30\" L foot in front  Partial tandem on standing on load EC x20\" + 30\" R foot in front  Golf swings standing on foam pad 5x2 rounds. CGA  Step over single david   FWD/BKWD  Lateral  Step over hurdles in series  Partial tandem progressed to tandem stance - each leg  Orally reviewed how to progress to remain challenged at home      Outpatient Education:   Objective exam findings  Epic chat pt PCP for neck order due to ongoing pain  POC update cont 1x/week for 6 visits.    Kit Franklin verbalizes understanding of all education provided: Yes    Assessment:  Kit Franklin has completed 8 sessions of physical therapy treatment with emphasis upon addressing altered balance from R foot vascular neuropathy causing numbness and pain in foot as well as R hip deficits. He demonstrates fluctuating symptoms with RLE numbness and tingling especially in his R foot. However, his hip abduction strength has improved compared to initial evaluation along with his 5xSTS, TUG, 10MWT, and FGA scores have all improved. However, gait deviations are still present with ambulation over even and uneven surfaces, pt shows a regression in both ABC scale and LEFs scores, and pt still feels his R leg does not feel like his L leg. Currently, Kit Franklin is making progress towards all established PT POC goals at this time. he would continue to benefit from skilled PT services to address his remaining impairments to restore his PLOF/activity tolerance. Recommend he continue with PT treatment 1x per week for 6 visits. Kit Franklin voiced agreement and satisfaction with this plan.      Plan:  Cont 1x/week for 6 weeks  Monitor response to HEP update to address R " hip strength and ROM deficits.  Gait and dynamic balance as able with emphasis on uneven surfaces, eyes closed variations, single leg stance, and stepping response strategies       Goals:  ST weeks:  1. Kit Franklin will demonstrate independence and report compliance with HEP to facilitate independent rehab program upon discharge.  - 25: met     LTGs: 8 weeks  1. Kit Franklin will complete all 4 conditions of ModCTSIB for 30 secs with min to no sway to increase safety, decrease fall risk, and improve ability to complete functional activities.  - 25: not completed today     2. Kit Franklin  will increase FGA to >/= 23/30 to decrease fall risk and improve safety/IND at home. The Functional Gait Assessment(FGA) is 10-item test that assesses dynamic balance and postural stability during gait.  It is used to assessed the following diagnoses: Stroke, Parkinson's Disease, SCI's, Vestibular Disorders, and Geriatrics.  A score of < 22/30 indicates Increased fall risk.  - 25: partially met     3. Kit Franklin  will increase the 10MWT score by >/=0.16m/s (MCID) and > 0.8 m/s which is predictive of community ambulation post stroke. Gait speed on the 10MWT in regards to ambulation classification: </= 0.4 m/s household ambulator; 0.4 m/s-0.8 m/s limited community ambulation; > 0.8 m/s community ambulator)  - 25: partially met     4. Kit Franklin will ambulate with IND without device on his driveway (dry conditions) on without imbalance or major deviation to safely return to PLOF and enhance access to the home and car for transportation.  - 25: met     5. Kti Franklin will be able to ambulate IND without device on unrestricted distances on even and uneven surfaces to mimic grass surfaces of golf course per PLOF and resume recreational activities for improved quality of life.  - 25: partially met      Time Calculation  Start Time: 906  Stop Time: 948  Time Calculation (min): 42 min      PT Therapeutic Procedures Time Entry  Therapeutic Activity Time Entry: 42

## 2025-03-17 ENCOUNTER — TREATMENT (OUTPATIENT)
Dept: PHYSICAL THERAPY | Facility: CLINIC | Age: 80
End: 2025-03-17
Payer: MEDICARE

## 2025-03-17 DIAGNOSIS — G62.9 NEUROPATHY: ICD-10-CM

## 2025-03-17 PROCEDURE — 97530 THERAPEUTIC ACTIVITIES: CPT | Mod: GP | Performed by: INTERNAL MEDICINE

## 2025-03-18 ENCOUNTER — TELEPHONE (OUTPATIENT)
Dept: PRIMARY CARE | Facility: CLINIC | Age: 80
End: 2025-03-18
Payer: MEDICARE

## 2025-03-18 NOTE — TELEPHONE ENCOUNTER
PT is having a level 7 pain in his right side of neck  He is not sure what to do?      He wants something done?

## 2025-03-19 ENCOUNTER — OFFICE VISIT (OUTPATIENT)
Dept: PRIMARY CARE | Facility: CLINIC | Age: 80
End: 2025-03-19
Payer: MEDICARE

## 2025-03-19 VITALS
OXYGEN SATURATION: 97 % | HEART RATE: 71 BPM | BODY MASS INDEX: 33.03 KG/M2 | TEMPERATURE: 97.3 F | HEIGHT: 69 IN | DIASTOLIC BLOOD PRESSURE: 78 MMHG | WEIGHT: 223 LBS | SYSTOLIC BLOOD PRESSURE: 141 MMHG

## 2025-03-19 DIAGNOSIS — S16.1XXA STRAIN OF NECK MUSCLE, INITIAL ENCOUNTER: Primary | ICD-10-CM

## 2025-03-19 PROCEDURE — 3077F SYST BP >= 140 MM HG: CPT | Performed by: FAMILY MEDICINE

## 2025-03-19 PROCEDURE — 1160F RVW MEDS BY RX/DR IN RCRD: CPT | Performed by: FAMILY MEDICINE

## 2025-03-19 PROCEDURE — 99213 OFFICE O/P EST LOW 20 MIN: CPT | Performed by: FAMILY MEDICINE

## 2025-03-19 PROCEDURE — 1159F MED LIST DOCD IN RCRD: CPT | Performed by: FAMILY MEDICINE

## 2025-03-19 PROCEDURE — 3078F DIAST BP <80 MM HG: CPT | Performed by: FAMILY MEDICINE

## 2025-03-19 PROCEDURE — 1036F TOBACCO NON-USER: CPT | Performed by: FAMILY MEDICINE

## 2025-03-19 PROCEDURE — 96372 THER/PROPH/DIAG INJ SC/IM: CPT | Performed by: FAMILY MEDICINE

## 2025-03-19 RX ORDER — MELOXICAM 7.5 MG/1
7.5 TABLET ORAL DAILY
Qty: 30 TABLET | Refills: 0 | Status: SHIPPED | OUTPATIENT
Start: 2025-03-19 | End: 2026-03-19

## 2025-03-19 RX ORDER — TRIAMCINOLONE ACETONIDE 40 MG/ML
40 INJECTION, SUSPENSION INTRA-ARTICULAR; INTRAMUSCULAR ONCE
Status: COMPLETED | OUTPATIENT
Start: 2025-03-19 | End: 2025-03-19

## 2025-03-19 RX ORDER — PREDNISONE 20 MG/1
TABLET ORAL
Qty: 11 TABLET | Refills: 0 | Status: SHIPPED | OUTPATIENT
Start: 2025-03-19 | End: 2025-03-29

## 2025-03-19 RX ORDER — ACETAMINOPHEN 325 MG/1
1000 TABLET ORAL
COMMUNITY
Start: 2024-06-11

## 2025-03-19 RX ORDER — KETOROLAC TROMETHAMINE 30 MG/ML
60 INJECTION, SOLUTION INTRAMUSCULAR; INTRAVENOUS ONCE
Status: COMPLETED | OUTPATIENT
Start: 2025-03-19 | End: 2025-03-19

## 2025-03-19 RX ADMIN — TRIAMCINOLONE ACETONIDE 40 MG: 40 INJECTION, SUSPENSION INTRA-ARTICULAR; INTRAMUSCULAR at 10:43

## 2025-03-19 RX ADMIN — KETOROLAC TROMETHAMINE 60 MG: 30 INJECTION, SOLUTION INTRAMUSCULAR; INTRAVENOUS at 10:41

## 2025-03-19 NOTE — PROGRESS NOTES
"Subjective   Patient ID: Kit Franklin is a 79 y.o. male who presents for Neck Pain.    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      amoxicillin (Amoxil) 500 mg tablet       ascorbic acid (Vitamin C) 500 mg tablet Take 1 tablet (500 mg) by mouth once daily.      aspirin 81 mg EC tablet Take 1 tablet (81 mg) by mouth once daily. 30 tablet 3    metoprolol succinate XL (Toprol-XL) 50 mg 24 hr tablet Take 1 tablet (50 mg) by mouth once daily. 90 tablet 1    multivitamin with minerals tablet Take 1 tablet by mouth once daily.      simethicone (Mylicon) 80 mg chewable tablet Chew 0.5 tablets (40 mg) 4 times a day as needed for flatulence. 30 tablet 0    simvastatin (Zocor) 20 mg tablet Take 1 tablet (20 mg) by mouth once daily. 90 tablet 2     No current facility-administered medications for this visit.       Objective   Visit Vitals  /78 (BP Location: Left arm, Patient Position: Sitting)   Pulse 71   Temp 36.3 °C (97.3 °F)   Ht 1.753 m (5' 9\")   Wt 101 kg (223 lb)   SpO2 97%   BMI 32.93 kg/m²   Smoking Status Former   BSA 2.22 m²     Physical Exam  Constitutional:       General: He is not in acute distress.     Appearance: Normal appearance.   HENT:      Head: Normocephalic and atraumatic.      Nose: Nose normal.   Eyes:      Extraocular " Movements: Extraocular movements intact.      Conjunctiva/sclera: Conjunctivae normal.   Cardiovascular:      Rate and Rhythm: Normal rate and regular rhythm.   Pulmonary:      Effort: Pulmonary effort is normal.      Breath sounds: Normal breath sounds.   Skin:     General: Skin is warm.   Neurological:      Mental Status: He is alert and oriented to person, place, and time.   Psychiatric:         Mood and Affect: Mood normal.         Behavior: Behavior normal.     Right C-spine paraspinal tenderness present with movement restricted in all direction  Immunization History   Administered Date(s) Administered    COVID-19, subunit, rS-nanoparticle, adjuvanted, PF, 5 mcg/0.5 mL 12/04/2024    Flu vaccine, quadrivalent, high-dose, preservative free, age 65y+ (FLUZONE) 11/24/2023    Flu vaccine, trivalent, preservative free, HIGH-DOSE, age 65y+ (Fluzone) 09/18/2017, 09/23/2019, 10/07/2020, 11/14/2024    Influenza, Seasonal, Quadrivalent, Adjuvanted 10/13/2022    Influenza, Unspecified 01/07/2008, 12/06/2011, 12/21/2012, 12/17/2013    Influenza, seasonal, injectable 10/08/2015, 10/04/2016, 10/14/2021    Influenza, trivalent, adjuvanted 09/28/2018    Moderna COVID-19 vaccine, 12 years and older (50mcg/0.5mL)(Spikevax) 11/24/2023    Moderna COVID-19 vaccine, bivalent, blue cap/gray label *Check age/dose* 10/14/2022    Moderna SARS-CoV-2 Vaccination 03/15/2021, 04/13/2021, 12/10/2021    PPD Test 06/06/2024, 06/12/2024    Pneumococcal Conjugate PCV 7 1946    Pneumococcal conjugate vaccine, 13-valent (PREVNAR 13) 09/18/2015    Pneumococcal conjugate vaccine, 20-valent (PREVNAR 20) 11/14/2024    Pneumococcal polysaccharide vaccine, 23-valent, age 2 years and older (PNEUMOVAX 23) 02/14/2012    RSV, 60 Years And Older (AREXVY) 12/03/2024    Tdap vaccine, age 7 year and older (BOOSTRIX, ADACEL) 01/07/2008, 06/22/2022    Zoster vaccine, recombinant, adult (SHINGRIX) 05/28/2021, 08/10/2021    Zoster, live 01/05/2014       Review  of Systems    No visits with results within 4 Month(s) from this visit.   Latest known visit with results is:   Office Visit on 2024   Component Date Value Ref Range Status    POCT Hemoglobin A1C 2024 5.6  4.2 - 6.5 % Final       Radiology: Reviewed imaging in powerchart.  No results found.    Family History   Problem Relation Name Age of Onset    Dementia Mother      Hypertension Mother      Other (Senile dementia) Mother      Dementia Father      Diabetes Father      Hypertension Father      Mitral valve prolapse Father      Heart attack Father      Other (Senile dementia) Father      Diabetes Brother       Social History     Socioeconomic History    Marital status:    Tobacco Use    Smoking status: Former     Current packs/day: 0.00     Average packs/day: 2.0 packs/day for 15.0 years (30.0 ttl pk-yrs)     Types: Cigarettes, Cigars     Quit date: 2007     Years since quittin.5     Passive exposure: Past    Smokeless tobacco: Former    Tobacco comments:     Six, five, and eight non-smoking intervals   Vaping Use    Vaping status: Never Used   Substance and Sexual Activity    Alcohol use: Yes     Alcohol/week: 10.0 standard drinks of alcohol     Types: 3 Glasses of wine, 6 Cans of beer, 1 Shots of liquor per week     Comment: 2-3 a day    Drug use: Never    Sexual activity: Not Currently     Partners: Female     Birth control/protection: Male Sterilization     Social Drivers of Health     Financial Resource Strain: Low Risk  (8/15/2024)    Overall Financial Resource Strain (CARDIA)     Difficulty of Paying Living Expenses: Not hard at all   Transportation Needs: No Transportation Needs (8/15/2024)    PRAPARE - Transportation     Lack of Transportation (Medical): No     Lack of Transportation (Non-Medical): No   Housing Stability: Low Risk  (8/15/2024)    Housing Stability Vital Sign     Unable to Pay for Housing in the Last Year: No     Number of Times Moved in the Last Year: 0      Homeless in the Last Year: No     Past Medical History:   Diagnosis Date    Arthritis     Heart valve disease     HL (hearing loss)     Hyperlipidemia     Hypertension     Joint pain     Nephrolithiasis     Personal history of other diseases of the circulatory system 03/09/2021    History of hypertension    Personal history of other diseases of the digestive system 03/09/2021    History of pancreatitis    Personal history of other diseases of the musculoskeletal system and connective tissue 03/09/2021    History of back pain    Personal history of other diseases of the musculoskeletal system and connective tissue 03/09/2021    History of back pain    Personal history of other endocrine, nutritional and metabolic disease 03/09/2021    History of hypercholesterolemia    Squamous cell skin cancer      Past Surgical History:   Procedure Laterality Date    CATARACT EXTRACTION      CERVICAL DISCECTOMY      COLONOSCOPY      HIP ARTHROPLASTY      KNEE ARTHROPLASTY      OTHER SURGICAL HISTORY  2008    Cervical vertebral fusion    OTHER SURGICAL HISTORY  2019    Colectomy    OTHER SURGICAL HISTORY  03/09/2021    Colonoscopy    OTHER SURGICAL HISTORY  03/09/2021    Skin biopsy    OTHER SURGICAL HISTORY  2015 Right; 2017 Left    Total Knee Replacement    ROTATOR CUFF REPAIR      SHOULDER SURGERY Left 2013    Shoulder Surgery    TOTAL HIP ARTHROPLASTY Right 06/2024       Charting was completed using voice recognition technology and may include unintended errors.

## 2025-04-02 NOTE — PROGRESS NOTES
Physical Therapy    Physical Therapy Treatment Note    Patient Name: Kit Franklin  MRN: [id number]  Today's Date: 4/3/2025  Time Calculation  Start Time: 0854  Stop Time: 0935  Time Calculation (min): 41 min    Insurance - reviewed   Visit number: 9 (updated 04/03/25) (1 of 6 approved)  Payor: [employer] MEDICARE / Plan: [employer] GOLD CHOICE / Product Type: *No Product type* /    VISITS ARE MED NEC NEEDS AUTH [employer] PAYS AT 80% AFTER .00 0 APPLIED OOP 2150.00 60.00 APPLIED   **AUTH 8 V 1-27-4/12/25**  NEW PT AUTH 6V 3/20-5/16/25  ** no e-stim**  Referring Provider: Imelda Pierre DO       Current Problem  Problem List Items Addressed This Visit             ICD-10-CM       Musculoskeletal and Injuries    Neck pain - Primary M54.2     Other Visit Diagnoses         Codes    Neuropathy     G62.9              Precautions:  Fall Risk: Low based on hx  **no joint mobs or mechanical traction to cervical spine**   + skin CA; face and ear; medically managed. +HBP; medically managed. +Vascular disease; medically managed. +OA; medically managed. +adhesive allergy  Denies: pacemaker, DM, HTN, blood thinner medication use, latex allergy, epilepsy/seizures, or other known cardio/neuro/pulmonary problems   Denies unexpected weight loss/gain, night sweats, or night pain.     Previous surgeries include:  Vascular surgery 08/2024, R THR 06/2024, colon 2020, L TKA 2017, R TKA 2015, cervical disc surgery and fusion 2008     General  Subjective    Kit Franklin denies any falls or complications since last session. Pt reports he feels his balance and his movements are better  but he is still worried about his balance. Reports his neuropathy is still present and his symptoms will vary. He is no longer taking gabapentin he reports. He is going to have an EMG done in the future.   Reports his PCP put a PT order in for neck pain. He recently had INC pain in his neck a couple days after last session - had to take pain medication for it. He  does have h/o fusion in his neck. Pt has questions regarding PT for his neck. Denies any radicular symptoms.   Turning his head will INC his pain along with being on a computer.     Kit Franklin reports good compliance with HEP.     Pain: 2-3/10  Pain location: R side of neck    CT scan cervical spine 6/7/2024:   FINDINGS:  There is straightening and mild reversal of the normal cervical  lordosis. Normal appearance of prior anterior cervical discectomy and  fusion at C4-C5-C6-C7. No acute fracture or spondylolisthesis is  identified. Facet degenerative changes and uncovertebral joint  degenerative changes are present.          The occipital condyles, arch of C1, and the odontoid processes are  intact. The atlantoaxial relationship adequately maintained.      There is mild-moderate disc space narrowing at C2-3. Moderate disc  space narrowing at C3-4. Fused intervertebral bodies at C4-C7.  Moderate disc space narrowing at C7-T1.      Diffuse moderate spinal canal stenosis with possible multilevel cord  impingement and possible mild chronic compression. There is  multilevel high-grade bilateral neural foramina stenosis throughout  the cervical spine.      The visualized lung apices are unremarkable.      IMPRESSION:  1. No acute fracture or spondylolisthesis.  2. Advanced degenerative changes of the cervical spine as described  in the body of the report.        Objective:     Treatments:  Abbreviation Key  NP = not performed this visit   NV = next visit  // = parallel    Assigned HEP- [employer] Access Code: [email or web address]     Therapeutic Activity  Discussion of symptoms, answered pt's questions in full regarding neck pain and how PT can help with neck pain.   Given pt's prior CT scan of cervical spine, educated pt regarding signs of cord myelopathy     Neck Assessment   Myotomes/Strength (MMT in sitting):  Shoulder Elevation (C4) R/L: 4+/4+   Shoulder Abduction (C5) R/L: 4+/4+  Elbow Flexion/Wrist Ext (C6) R/L: 4+/4+  Elbow  Extension/Wrist Flexion (C7) R/L: 4+/4+  Thumb Ext/Ulnar Deviation (C8) R/L: 4+/4+  Hand Intrinsics- abd/add (T1) R/L: 4+/4+    Reflexes:   -Biceps (C5): 2+ B   -Brachioradialis (C6): 2+ B   -Triceps (C7): 2+ B     Braswell's = negative     Range of Motion (AROM in sitting via goniometer):  * = pain present/reproduction of symptoms  Cervical Flexion: 20 degrees no pain   Cervical Extension: 42 degrees* R sided neck pain   Cervical Rotation R/L: 50* R sided neck pain / 38     Special Tests:  VBI: negative     Palpation: INC muscular tension and soreness upon palpation to R cervical paraspinals      Manual Therapy:  Completed in sitting with small lumbar support: STM to R cervical paraspinals, UT  During manual therapy completed postural education: use of lumbar support in sitting, importance of increasing postural awareness, use of laptop stand when using computer.       Outpatient Education: see above     Kit Franklin verbalizes understanding of all education provided: Yes      Assessment:  Kit Franklin completed treatment today which focused upon assessing cervical spine (new order in place) in order to address ongoing neck pain.  Kit presents with neck pain with mobility deficits likely related to decreased postural awareness and resultant muscular dysfunction.  Kit requires INC cues/education regarding proper posture but he was very receptive to provided education today. Good relief with manual interventions with pain levels reducing to 1/10 and improved muscle tension present.  Kit's Progress towards goals:  improving balance, decreased pain levels . Kit Franklin continues to have gait deficits, balance deficits, decreased strength, decreased ROM, and pain limiting participation in household chores, recreational activities, exercise, and shopping within the community. Further skilled therapy services are warranted to address the remaining impairments in order to progress towards functional goals. Kit left  session with all questions answered and no increase in symptoms.        Plan: Neck pain added to PT POC (see below) - progress DNF and postural strength  Monitor response to HEP update to address R hip strength and ROM deficits.  Gait and dynamic balance as able with emphasis on uneven surfaces, eyes closed variations, single leg stance, and stepping response strategies   Treatment/Interventions: Aquatic therapy, Cryotherapy, Dry needling, Education/ Instruction, Gait training, Manual therapy, Neuromuscular re-education, Therapeutic activities, Therapeutic exercises, Ultrasound  PT Plan: Skilled PT        Time Calculation  Start Time: 0854  Stop Time: 0935  Time Calculation (min): 41 min     PT Therapeutic Procedures Time Entry  Manual Therapy Time Entry: 15  Therapeutic Activity Time Entry: 26

## 2025-04-03 ENCOUNTER — TREATMENT (OUTPATIENT)
Dept: PHYSICAL THERAPY | Facility: CLINIC | Age: 80
End: 2025-04-03
Payer: MEDICARE

## 2025-04-03 DIAGNOSIS — M54.2 NECK PAIN: Primary | ICD-10-CM

## 2025-04-03 DIAGNOSIS — G62.9 NEUROPATHY: ICD-10-CM

## 2025-04-03 PROCEDURE — 97530 THERAPEUTIC ACTIVITIES: CPT | Mod: GP

## 2025-04-03 PROCEDURE — 97140 MANUAL THERAPY 1/> REGIONS: CPT | Mod: GP

## 2025-04-14 ENCOUNTER — APPOINTMENT (OUTPATIENT)
Dept: PHYSICAL THERAPY | Facility: CLINIC | Age: 80
End: 2025-04-14
Payer: MEDICARE

## 2025-04-15 ENCOUNTER — HOSPITAL ENCOUNTER (OUTPATIENT)
Dept: NEUROLOGY | Facility: HOSPITAL | Age: 80
Discharge: HOME | End: 2025-04-15
Payer: MEDICARE

## 2025-04-15 DIAGNOSIS — G62.9 NEUROPATHY: ICD-10-CM

## 2025-04-15 PROCEDURE — 95912 NRV CNDJ TEST 11-12 STUDIES: CPT | Performed by: PSYCHIATRY & NEUROLOGY

## 2025-04-15 PROCEDURE — 95886 MUSC TEST DONE W/N TEST COMP: CPT | Performed by: PSYCHIATRY & NEUROLOGY

## 2025-04-21 ENCOUNTER — APPOINTMENT (OUTPATIENT)
Dept: PHYSICAL THERAPY | Facility: CLINIC | Age: 80
End: 2025-04-21
Payer: MEDICARE

## 2025-04-28 ENCOUNTER — APPOINTMENT (OUTPATIENT)
Dept: PHYSICAL THERAPY | Facility: CLINIC | Age: 80
End: 2025-04-28
Payer: MEDICARE

## 2025-05-05 ENCOUNTER — APPOINTMENT (OUTPATIENT)
Dept: PHYSICAL THERAPY | Facility: CLINIC | Age: 80
End: 2025-05-05
Payer: MEDICARE

## 2025-05-12 ENCOUNTER — APPOINTMENT (OUTPATIENT)
Dept: PHYSICAL THERAPY | Facility: CLINIC | Age: 80
End: 2025-05-12
Payer: MEDICARE

## 2025-06-27 ENCOUNTER — TELEPHONE (OUTPATIENT)
Dept: PRIMARY CARE | Facility: CLINIC | Age: 80
End: 2025-06-27
Payer: MEDICARE

## 2025-06-27 DIAGNOSIS — I10 HYPERTENSION, UNSPECIFIED TYPE: ICD-10-CM

## 2025-06-27 RX ORDER — METOPROLOL SUCCINATE 50 MG/1
50 TABLET, EXTENDED RELEASE ORAL DAILY
Qty: 90 TABLET | Refills: 1 | Status: SHIPPED | OUTPATIENT
Start: 2025-06-27 | End: 2026-06-27

## 2025-07-08 ENCOUNTER — APPOINTMENT (OUTPATIENT)
Dept: PRIMARY CARE | Facility: CLINIC | Age: 80
End: 2025-07-08
Payer: MEDICARE

## 2025-07-08 VITALS
SYSTOLIC BLOOD PRESSURE: 140 MMHG | HEART RATE: 71 BPM | WEIGHT: 233 LBS | HEIGHT: 69 IN | OXYGEN SATURATION: 97 % | BODY MASS INDEX: 34.51 KG/M2 | DIASTOLIC BLOOD PRESSURE: 72 MMHG | TEMPERATURE: 97.9 F

## 2025-07-08 DIAGNOSIS — Z12.5 ENCOUNTER FOR SCREENING FOR MALIGNANT NEOPLASM OF PROSTATE: ICD-10-CM

## 2025-07-08 DIAGNOSIS — M54.16 RADICULOPATHY, LUMBAR REGION: ICD-10-CM

## 2025-07-08 DIAGNOSIS — R26.9 GAIT ABNORMALITY: ICD-10-CM

## 2025-07-08 DIAGNOSIS — L98.9 SKIN LESION: ICD-10-CM

## 2025-07-08 DIAGNOSIS — I10 BENIGN ESSENTIAL HYPERTENSION: ICD-10-CM

## 2025-07-08 DIAGNOSIS — Z00.00 VISIT FOR PREVENTIVE HEALTH EXAMINATION: Primary | ICD-10-CM

## 2025-07-08 DIAGNOSIS — E78.2 MIXED HYPERLIPIDEMIA: ICD-10-CM

## 2025-07-08 PROBLEM — L40.50 PSORIASIS WITH ARTHROPATHY (MULTI): Status: RESOLVED | Noted: 2017-04-12 | Resolved: 2025-07-08

## 2025-07-08 PROCEDURE — 1159F MED LIST DOCD IN RCRD: CPT | Performed by: FAMILY MEDICINE

## 2025-07-08 PROCEDURE — 3077F SYST BP >= 140 MM HG: CPT | Performed by: FAMILY MEDICINE

## 2025-07-08 PROCEDURE — 99497 ADVNCD CARE PLAN 30 MIN: CPT | Performed by: FAMILY MEDICINE

## 2025-07-08 PROCEDURE — G0439 PPPS, SUBSEQ VISIT: HCPCS | Performed by: FAMILY MEDICINE

## 2025-07-08 PROCEDURE — 1036F TOBACCO NON-USER: CPT | Performed by: FAMILY MEDICINE

## 2025-07-08 PROCEDURE — 1170F FXNL STATUS ASSESSED: CPT | Performed by: FAMILY MEDICINE

## 2025-07-08 PROCEDURE — 3078F DIAST BP <80 MM HG: CPT | Performed by: FAMILY MEDICINE

## 2025-07-08 PROCEDURE — 1160F RVW MEDS BY RX/DR IN RCRD: CPT | Performed by: FAMILY MEDICINE

## 2025-07-08 PROCEDURE — 1123F ACP DISCUSS/DSCN MKR DOCD: CPT | Performed by: FAMILY MEDICINE

## 2025-07-08 PROCEDURE — 99214 OFFICE O/P EST MOD 30 MIN: CPT | Performed by: FAMILY MEDICINE

## 2025-07-08 PROCEDURE — G0444 DEPRESSION SCREEN ANNUAL: HCPCS | Performed by: FAMILY MEDICINE

## 2025-07-08 ASSESSMENT — ACTIVITIES OF DAILY LIVING (ADL)
TAKING_MEDICATION: INDEPENDENT
MANAGING_FINANCES: INDEPENDENT
DRESSING: INDEPENDENT
DOING_HOUSEWORK: INDEPENDENT
BATHING: INDEPENDENT
GROCERY_SHOPPING: INDEPENDENT

## 2025-07-08 ASSESSMENT — LIFESTYLE VARIABLES
AUDIT-C TOTAL SCORE: 4
HOW MANY STANDARD DRINKS CONTAINING ALCOHOL DO YOU HAVE ON A TYPICAL DAY: 1 OR 2
HOW OFTEN DO YOU HAVE A DRINK CONTAINING ALCOHOL: 4 OR MORE TIMES A WEEK
SKIP TO QUESTIONS 9-10: 1
HOW OFTEN DO YOU HAVE SIX OR MORE DRINKS ON ONE OCCASION: NEVER

## 2025-07-08 NOTE — PROGRESS NOTES
Subjective   Patient ID: Kit Franklin is a 79 y.o. male who presents for Medicare Annual Wellness Visit Subsequent (Not fasting).    Assessment/Plan     Problem List Items Addressed This Visit       Benign essential hypertension    Relevant Orders    TSH with reflex to Free T4 if abnormal    Lipid Panel    Comprehensive Metabolic Panel    CBC    Urinalysis with Reflex Microscopic    Hyperlipidemia    Relevant Orders    TSH with reflex to Free T4 if abnormal    Lipid Panel    Comprehensive Metabolic Panel    CBC    Urinalysis with Reflex Microscopic    Radiculopathy, lumbar region    Relevant Orders    Disability Placard    Visit for preventive health examination - Primary    Relevant Orders    Prostate Specific Antigen, Screen    TSH with reflex to Free T4 if abnormal    Lipid Panel    Comprehensive Metabolic Panel    CBC    Urinalysis with Reflex Microscopic    Encounter for screening for malignant neoplasm of prostate    Relevant Orders    Prostate Specific Antigen, Screen    Skin lesion    Gait abnormality   Time spent around 10 minutes obtaining and discussing depression screening using PHQ 9 questions with results documented in the chart      Discussed about diet exercise  Discussed about age-related immunization  Discussed about fasting lab work  Follow-up every year for physical    Colon cancer screening up-to-date with colonoscopy   PSA check will check today pneumonia vaccine received  Come back early with any new signs and symptoms  Patient understood and agreed with plan.    HPI    79-year-old male here for Medicare wellness visit and follow-up on chronic medical conditions    Peripheral artery disease status post right iliofemoral endarterectomy following with vascular surgery  Recent right foot surgery for nonhealing ulcer with osteomyelitis  Neuropathy - Rt lower extremity - was on gabapentin      Rt hip - replacement  B/l knee TKA  Psorisis  Lipoma - multiple    Seeing vascular sx and  podiatry  seeing neurology for neuropathy- balance - from ioschemia and back     Complaining of gait and balance problem had a physical therapy in the past  Advised patient to continue  Fall precautions  Disability placard given    Right lower extremity neuropathy  Complaining of skin lesion on the chest on and off bleeding advised to see dermatology    Advance directives:. Advanced Care Planning discussed and documented advance care plan or surrogate decision maker documented in the medical record.   A Conversation about Advance directives of at least 16 minutes has occurred. Actual time spent: I spent >16 minutes discussing Advance Care Planning including the explanation and discussion of advance directives. If patient does not have current up to date documents, examples and information provided on how to create both Living Will and Power of . Patient was encouraged to work on completing these documents.  Conversation with: The conversation with the patient and/or participants was voluntary.  Documents discussed and/or completed: Living Will was discussed with participants. Healthcare POA was discussed with participants. Patient educated on how to obtain Living Will and Power of . Discussed patient end of life choices.   Patient has living will and patient's wife zachary is healthcare power of .  Patient is full code.    Allergies   Allergen Reactions    Lisinopril Cough    Meperidine Nausea/vomiting       Current Outpatient Medications   Medication Sig Dispense Refill    ascorbic acid (Vitamin C) 500 mg tablet Take 1 tablet (500 mg) by mouth once daily.      aspirin 81 mg EC tablet Take 1 tablet (81 mg) by mouth once daily. 30 tablet 3    meloxicam (Mobic) 7.5 mg tablet Take 1 tablet (7.5 mg) by mouth once daily. 30 tablet 0    metoprolol succinate XL (Toprol-XL) 50 mg 24 hr tablet Take 1 tablet (50 mg) by mouth once daily. 90 tablet 1    multivitamin with minerals tablet Take 1 tablet by  "mouth once daily.      simethicone (Mylicon) 80 mg chewable tablet Chew 0.5 tablets (40 mg) 4 times a day as needed for flatulence. 30 tablet 0    simvastatin (Zocor) 20 mg tablet Take 1 tablet (20 mg) by mouth once daily. 90 tablet 2     No current facility-administered medications for this visit.       Objective   Visit Vitals  /72 (BP Location: Left arm, Patient Position: Sitting)   Pulse 71   Temp 36.6 °C (97.9 °F)   Ht 1.753 m (5' 9\")   Wt 106 kg (233 lb)   SpO2 97%   BMI 34.41 kg/m²   Smoking Status Former   BSA 2.27 m²     Physical Exam  Constitutional:       General: He is not in acute distress.     Appearance: Normal appearance.   HENT:      Head: Normocephalic and atraumatic.      Nose: Nose normal.   Eyes:      Extraocular Movements: Extraocular movements intact.      Conjunctiva/sclera: Conjunctivae normal.   Cardiovascular:      Rate and Rhythm: Normal rate and regular rhythm.   Pulmonary:      Effort: Pulmonary effort is normal.      Breath sounds: Normal breath sounds.   Skin:     General: Skin is warm.   Neurological:      Mental Status: He is alert and oriented to person, place, and time.   Psychiatric:         Mood and Affect: Mood normal.         Behavior: Behavior normal.   Immunization History   Administered Date(s) Administered    COVID-19, subunit, rS-nanoparticle, adjuvanted, PF, 5 mcg/0.5 mL 12/04/2024    Flu vaccine, quadrivalent, high-dose, preservative free, age 65y+ (FLUZONE) 11/24/2023    Flu vaccine, trivalent, preservative free, HIGH-DOSE, age 65y+ (Fluzone) 09/18/2017, 09/23/2019, 10/07/2020, 11/14/2024    Influenza, Seasonal, Quadrivalent, Adjuvanted 10/13/2022    Influenza, Unspecified 01/07/2008, 12/06/2011, 12/21/2012, 12/17/2013    Influenza, seasonal, injectable 10/08/2015, 10/04/2016, 10/14/2021    Influenza, trivalent, adjuvanted 09/28/2018    Moderna COVID-19 vaccine, 12 years and older (50mcg/0.5mL)(Spikevax) 11/24/2023    Moderna COVID-19 vaccine, bivalent, blue " cap/gray label *Check age/dose* 10/14/2022    Moderna SARS-CoV-2 Vaccination 03/15/2021, 2021, 12/10/2021    PPD Test 2024, 2024    Pneumococcal Conjugate PCV 7 1946    Pneumococcal conjugate vaccine, 13-valent (PREVNAR 13) 2015    Pneumococcal conjugate vaccine, 20-valent (PREVNAR 20) 2024    Pneumococcal polysaccharide vaccine, 23-valent, age 2 years and older (PNEUMOVAX 23) 2012    RSV, 60 Years And Older (AREXVY) 2024    Tdap vaccine, age 7 year and older (BOOSTRIX, ADACEL) 2008, 2022    Zoster vaccine, recombinant, adult (SHINGRIX) 2021, 08/10/2021    Zoster, live 2014       Review of Systems    No visits with results within 4 Month(s) from this visit.   Latest known visit with results is:   Office Visit on 2024   Component Date Value Ref Range Status    POCT Hemoglobin A1C 2024 5.6  4.2 - 6.5 % Final       Radiology: Reviewed imaging in powerchart.  No results found.    Family History   Problem Relation Name Age of Onset    Dementia Mother      Hypertension Mother      Other (Senile dementia) Mother      Dementia Father      Diabetes Father      Hypertension Father      Mitral valve prolapse Father      Heart attack Father      Other (Senile dementia) Father      Diabetes Brother       Social History     Socioeconomic History    Marital status:    Tobacco Use    Smoking status: Former     Current packs/day: 0.00     Average packs/day: 2.0 packs/day for 15.0 years (30.0 ttl pk-yrs)     Types: Cigarettes, Cigars, Pipe     Quit date: 2007     Years since quittin.8     Passive exposure: Past    Smokeless tobacco: Former    Tobacco comments:     Six, five, and eight non-smoking intervals   Vaping Use    Vaping status: Never Used   Substance and Sexual Activity    Alcohol use: Yes     Alcohol/week: 12.0 standard drinks of alcohol     Types: 3 Glasses of wine, 6 Cans of beer, 1 Shots of liquor, 2 Standard drinks or  equivalent per week     Comment: 2-3 a day    Drug use: Never    Sexual activity: Not Currently     Partners: Female     Birth control/protection: None     Social Drivers of Health     Financial Resource Strain: Low Risk  (8/15/2024)    Overall Financial Resource Strain (CARDIA)     Difficulty of Paying Living Expenses: Not hard at all   Transportation Needs: No Transportation Needs (8/15/2024)    PRAPARE - Transportation     Lack of Transportation (Medical): No     Lack of Transportation (Non-Medical): No   Housing Stability: Low Risk  (8/15/2024)    Housing Stability Vital Sign     Unable to Pay for Housing in the Last Year: No     Number of Times Moved in the Last Year: 0     Homeless in the Last Year: No     Past Medical History:   Diagnosis Date    ADHD (attention deficit hyperactivity disorder) 2015    Allergic 2015    Arthritis Long ago    Eczema     Heart murmur     Heart valve disease     HL (hearing loss) 2019    Hyperlipidemia     Hypertension 2012    Inflammatory bowel disease 2017    Joint pain     Nephrolithiasis     Personal history of other diseases of the circulatory system 03/09/2021    History of hypertension    Personal history of other diseases of the digestive system 03/09/2021    History of pancreatitis    Personal history of other diseases of the musculoskeletal system and connective tissue 03/09/2021    History of back pain    Personal history of other diseases of the musculoskeletal system and connective tissue 03/09/2021    History of back pain    Personal history of other endocrine, nutritional and metabolic disease 03/09/2021    History of hypercholesterolemia    Squamous cell skin cancer      Past Surgical History:   Procedure Laterality Date    BACK SURGERY  2008    CATARACT EXTRACTION      CERVICAL DISCECTOMY      CIRCUMCISION, PRIMARY      COLON SURGERY      COLONOSCOPY      CORONARY STENT PLACEMENT  2024    HERNIA REPAIR  2019    HIP ARTHROPLASTY      JOINT REPLACEMENT   2015,2017,2024    KNEE ARTHROPLASTY      OTHER SURGICAL HISTORY  2008    Cervical vertebral fusion    OTHER SURGICAL HISTORY  2019    Colectomy    OTHER SURGICAL HISTORY  03/09/2021    Colonoscopy    OTHER SURGICAL HISTORY  03/09/2021    Skin biopsy    OTHER SURGICAL HISTORY  2015 Right; 2017 Left    Total Knee Replacement    ROTATOR CUFF REPAIR      SHOULDER SURGERY Left 2013    Shoulder Surgery    SPINE SURGERY  2008    cervical    TOTAL HIP ARTHROPLASTY Right 06/2024    VASECTOMY  1983       Charting was completed using voice recognition technology and may include unintended errors.

## 2025-07-12 LAB
ALBUMIN SERPL-MCNC: 4.7 G/DL (ref 3.6–5.1)
ALP SERPL-CCNC: 57 U/L (ref 35–144)
ALT SERPL-CCNC: 24 U/L (ref 9–46)
ANION GAP SERPL CALCULATED.4IONS-SCNC: 9 MMOL/L (CALC) (ref 7–17)
APPEARANCE UR: CLEAR
AST SERPL-CCNC: 25 U/L (ref 10–35)
BILIRUB SERPL-MCNC: 1 MG/DL (ref 0.2–1.2)
BILIRUB UR QL STRIP: NEGATIVE
BUN SERPL-MCNC: 18 MG/DL (ref 7–25)
CALCIUM SERPL-MCNC: 9.3 MG/DL (ref 8.6–10.3)
CHLORIDE SERPL-SCNC: 108 MMOL/L (ref 98–110)
CHOLEST SERPL-MCNC: 167 MG/DL
CHOLEST/HDLC SERPL: 2.7 (CALC)
CO2 SERPL-SCNC: 24 MMOL/L (ref 20–32)
COLOR UR: YELLOW
CREAT SERPL-MCNC: 0.79 MG/DL (ref 0.7–1.28)
EGFRCR SERPLBLD CKD-EPI 2021: 91 ML/MIN/1.73M2
ERYTHROCYTE [DISTWIDTH] IN BLOOD BY AUTOMATED COUNT: 14.4 % (ref 11–15)
GLUCOSE SERPL-MCNC: 97 MG/DL (ref 65–99)
GLUCOSE UR QL STRIP: NEGATIVE
HCT VFR BLD AUTO: 41.2 % (ref 38.5–50)
HDLC SERPL-MCNC: 61 MG/DL
HGB BLD-MCNC: 13.3 G/DL (ref 13.2–17.1)
HGB UR QL STRIP: NEGATIVE
KETONES UR QL STRIP: NEGATIVE
LDLC SERPL CALC-MCNC: 85 MG/DL (CALC)
LEUKOCYTE ESTERASE UR QL STRIP: NEGATIVE
MCH RBC QN AUTO: 32 PG (ref 27–33)
MCHC RBC AUTO-ENTMCNC: 32.3 G/DL (ref 32–36)
MCV RBC AUTO: 99.3 FL (ref 80–100)
NITRITE UR QL STRIP: NEGATIVE
NONHDLC SERPL-MCNC: 106 MG/DL (CALC)
PH UR STRIP: 6 [PH] (ref 5–8)
PLATELET # BLD AUTO: 89 THOUSAND/UL (ref 140–400)
PMV BLD REES-ECKER: 12.1 FL (ref 7.5–12.5)
POTASSIUM SERPL-SCNC: 4.2 MMOL/L (ref 3.5–5.3)
PROT SERPL-MCNC: 7.1 G/DL (ref 6.1–8.1)
PROT UR QL STRIP: NEGATIVE
PSA SERPL-MCNC: 0.32 NG/ML
RBC # BLD AUTO: 4.15 MILLION/UL (ref 4.2–5.8)
SODIUM SERPL-SCNC: 141 MMOL/L (ref 135–146)
SP GR UR STRIP: 1.02 (ref 1–1.03)
TRIGL SERPL-MCNC: 114 MG/DL
TSH SERPL-ACNC: 2.11 MIU/L (ref 0.4–4.5)
WBC # BLD AUTO: 6.6 THOUSAND/UL (ref 3.8–10.8)

## 2025-07-14 ENCOUNTER — TELEPHONE (OUTPATIENT)
Dept: PRIMARY CARE | Facility: CLINIC | Age: 80
End: 2025-07-14
Payer: MEDICARE

## 2025-07-14 DIAGNOSIS — D69.6 LOW PLATELET COUNT: ICD-10-CM

## 2025-07-14 NOTE — TELEPHONE ENCOUNTER
----- Message from Jon Hastings sent at 7/14/2025 12:15 PM EDT -----  Labs okay except platelet function is low.  Need to repeat CBC in 1 month.  ----- Message -----  From: Nelly Munoz Results In  Sent: 7/11/2025   9:34 PM EDT  To: Jon Hastings MD

## 2025-08-15 LAB
ERYTHROCYTE [DISTWIDTH] IN BLOOD BY AUTOMATED COUNT: 13.9 % (ref 11–15)
HCT VFR BLD AUTO: 40.1 % (ref 38.5–50)
HGB BLD-MCNC: 12.8 G/DL (ref 13.2–17.1)
MCH RBC QN AUTO: 32.3 PG (ref 27–33)
MCHC RBC AUTO-ENTMCNC: 31.9 G/DL (ref 32–36)
MCV RBC AUTO: 101.3 FL (ref 80–100)
PLATELET # BLD AUTO: 76 THOUSAND/UL (ref 140–400)
PMV BLD REES-ECKER: 12.2 FL (ref 7.5–12.5)
RBC # BLD AUTO: 3.96 MILLION/UL (ref 4.2–5.8)
WBC # BLD AUTO: 5.3 THOUSAND/UL (ref 3.8–10.8)

## 2026-01-08 ENCOUNTER — APPOINTMENT (OUTPATIENT)
Dept: PRIMARY CARE | Facility: CLINIC | Age: 81
End: 2026-01-08
Payer: MEDICARE

## (undated) DEVICE — STRIP, SKIN CLOSURE, STERI STRIP, REINFORCED, 0.5 X 4 IN

## (undated) DEVICE — BANDAGE, GAUZE, CONFORMING, KERLIX, 6 PLY, 4.5 IN X 4.1 YD

## (undated) DEVICE — FEMORAL CANAL TIP FOR INTERPULSE HANDPIECE SET

## (undated) DEVICE — SHEATH, PINNACLE, W/O GUIDEWIRE, 10 CM,  7FR INTRODUCER, 7FR DIA, 2.5 CM DIALATOR

## (undated) DEVICE — GEL, ECOVUE, ULTRASOUND, 20GRAM, STERILE

## (undated) DEVICE — STOPCOCK, MORSE CLASSIC

## (undated) DEVICE — SPONGE, HEMOSTATIC, GELATIN, SURGIFOAM, 8 X 12.5 CM X 10 MM

## (undated) DEVICE — RETRIEVER, SUTURE, HEWSON

## (undated) DEVICE — HOOD, STERISHIELD T4 SYSTEM

## (undated) DEVICE — INTRODUCER SET, MICROPUNCTURE, REG, 4FR 10CM W/SS GUIDEWIRE

## (undated) DEVICE — COVER, EQUIPMENT, C ARM, BAG, BANDED, 36 X 28 IN, STERILE

## (undated) DEVICE — DRESSING, TELFA, 3X4

## (undated) DEVICE — DRAPE, SHEET, U, W/ADHESIVE STRIP, IMPERVIOUS, 60 X 70 IN, DISPOSABLE, LF, STERILE

## (undated) DEVICE — SPONGE, LAP, XRAY DECT, 18IN X 18IN, W/MASTER DMT, STERILE

## (undated) DEVICE — STOPCOCK, 3 WAY, LUER LOCK, MALE

## (undated) DEVICE — BANDAGE, COFLEX, 6 X 5 YDS, FOAM TAN, STERILE, LF

## (undated) DEVICE — 14IN MEPILEX BORDER POST OP AG FOAM DRESSING, STERILE

## (undated) DEVICE — PILLOW, ABDUCTION,CONCAVE, HIP

## (undated) DEVICE — BAG, BANDED, 36IN X 28IN

## (undated) DEVICE — SLEEVE, VASO PRESS, CALF GARMENT, MEDIUM, GREEN

## (undated) DEVICE — DRESSING, JUMPSTART, SINGLE LAYER, 2 X 5

## (undated) DEVICE — Device

## (undated) DEVICE — APPLIER,  LIGACLIP MULTI CLIP, 30 MED 11 1/2

## (undated) DEVICE — CATHETER, PERFORMA, 5FR 65CM, VESSEL SIZING, 20 BAND PIGTAIL, 0.035

## (undated) DEVICE — SPONGE, GAUZE, XRAY DECT, 16 PLY, 4 X 8, W/MASTER DMT,STERILE

## (undated) DEVICE — SET, EXTENSION, IV, 3.9ML, 34 IN, MALE, LUER LOCK, LF

## (undated) DEVICE — APPLIER, LIGACLIP, MULTIPLE, SMALL 9-3/8IN

## (undated) DEVICE — DRAPE KIT, MINI C-ARM

## (undated) DEVICE — BLADE, SAGITTAL DUAL CUT, 25 X 90 X 1.27

## (undated) DEVICE — CATHETER, RIM SELECTIVE 10733401

## (undated) DEVICE — DRAPE, U-DRAPE, NON STERILE

## (undated) DEVICE — CATHETER, BALLOON, PTA, DORADO, 9 X 4 X 80

## (undated) DEVICE — BAG, DECANTER

## (undated) DEVICE — WOUND SYSTEM, DEBRIDEMENT & CLEANING, O.R DUOPAK

## (undated) DEVICE — APPLICATOR, CHLORAPREP, W/ORANGE TINT, 26ML

## (undated) DEVICE — CLEANER, WIPE, INSTRUMENT, 3.25 X 3.25 IN

## (undated) DEVICE — DRESSING, GAUZE, SPONGE, 12 PLY, CURITY, 4 X 4 IN, RIGID TRAY, STERILE

## (undated) DEVICE — BASIN, EMESIS, STANDARD, STERILE

## (undated) DEVICE — DRAPE, TIBURON, HIP W/POUCHES

## (undated) DEVICE — BASIN KIT, SINGLE

## (undated) DEVICE — SYRINGE, 20 CC, LUER LOCK

## (undated) DEVICE — DRAPE, SHEET, 17 X 23 IN

## (undated) DEVICE — SYRINGE, 10 CC, SLIP TIP

## (undated) DEVICE — SHEATH, PINNACLE, W/.038 GW 10CM, 5FR INTRODUCER, 2.5 CM DIALATOR

## (undated) DEVICE — GOWN, SURGICAL, SMARTGOWN, XLARGE, STERILE

## (undated) DEVICE — DRESSING, GAUZE, SUPER, KERLIX, 7.75 X 8.75 IN, BULK, NS

## (undated) DEVICE — HEMOSTAT, SURGICEL POWDER 3.0GRAMS

## (undated) DEVICE — CURETTE, DERMAL, DISPOSABLE, 4MM

## (undated) DEVICE — TIP, SUCTION, SUPER SUCKER, KAM, MINI, CURVED

## (undated) DEVICE — GUIDEWIRE, ANGLE TIP,  .035 DIA, 180 CM, 3 CM TIP"

## (undated) DEVICE — DEVICE, INFLATION, PRESTO ID40ATM 30CC

## (undated) DEVICE — MAT, AIR TRANSFER, 39X81

## (undated) DEVICE — DRAPE, SHEET, THREE QUARTER, FAN FOLD, 57 X 77 IN

## (undated) DEVICE — SYRINGE, 30 CC, LUER LOCK